# Patient Record
Sex: FEMALE | Race: WHITE | NOT HISPANIC OR LATINO | Employment: FULL TIME | URBAN - METROPOLITAN AREA
[De-identification: names, ages, dates, MRNs, and addresses within clinical notes are randomized per-mention and may not be internally consistent; named-entity substitution may affect disease eponyms.]

---

## 2017-07-27 ENCOUNTER — ALLSCRIPTS OFFICE VISIT (OUTPATIENT)
Dept: OTHER | Facility: OTHER | Age: 57
End: 2017-07-27

## 2017-07-27 ENCOUNTER — HOSPITAL ENCOUNTER (OUTPATIENT)
Dept: RADIOLOGY | Facility: HOSPITAL | Age: 57
Discharge: HOME | End: 2017-07-27
Attending: ORTHOPAEDIC SURGERY
Payer: COMMERCIAL

## 2017-07-27 DIAGNOSIS — M25.572 PAIN IN LEFT ANKLE: ICD-10-CM

## 2017-07-27 DIAGNOSIS — M23.92 INTERNAL DERANGEMENT OF LEFT KNEE: ICD-10-CM

## 2017-07-27 DIAGNOSIS — M25.562 PAIN IN LEFT KNEE: ICD-10-CM

## 2017-07-27 PROCEDURE — 73562 X-RAY EXAM OF KNEE 3: CPT

## 2017-07-27 PROCEDURE — 73610 X-RAY EXAM OF ANKLE: CPT

## 2017-08-23 ENCOUNTER — ALLSCRIPTS OFFICE VISIT (OUTPATIENT)
Dept: OTHER | Facility: OTHER | Age: 57
End: 2017-08-23

## 2017-08-23 ENCOUNTER — TRANSCRIBE ORDERS (OUTPATIENT)
Dept: ADMINISTRATIVE | Facility: HOSPITAL | Age: 57
End: 2017-08-23

## 2017-08-23 DIAGNOSIS — M23.92 DERANGEMENT OF COLLATERAL LIGAMENT OF LEFT KNEE: Primary | ICD-10-CM

## 2017-09-03 ENCOUNTER — HOSPITAL ENCOUNTER (OUTPATIENT)
Dept: RADIOLOGY | Facility: HOSPITAL | Age: 57
Discharge: HOME | End: 2017-09-03
Attending: ORTHOPAEDIC SURGERY
Payer: COMMERCIAL

## 2017-09-03 DIAGNOSIS — M23.92 INTERNAL DERANGEMENT OF LEFT KNEE: ICD-10-CM

## 2017-09-03 PROCEDURE — 73721 MRI JNT OF LWR EXTRE W/O DYE: CPT

## 2017-09-05 ENCOUNTER — ALLSCRIPTS OFFICE VISIT (OUTPATIENT)
Dept: OTHER | Facility: OTHER | Age: 57
End: 2017-09-05

## 2017-09-05 DIAGNOSIS — M17.12 PRIMARY OSTEOARTHRITIS OF LEFT KNEE: ICD-10-CM

## 2017-09-05 DIAGNOSIS — M23.92 INTERNAL DERANGEMENT OF LEFT KNEE: ICD-10-CM

## 2017-10-10 ENCOUNTER — GENERIC CONVERSION - ENCOUNTER (OUTPATIENT)
Dept: OTHER | Facility: OTHER | Age: 57
End: 2017-10-10

## 2017-10-17 ENCOUNTER — GENERIC CONVERSION - ENCOUNTER (OUTPATIENT)
Dept: OTHER | Facility: OTHER | Age: 57
End: 2017-10-17

## 2017-10-24 ENCOUNTER — GENERIC CONVERSION - ENCOUNTER (OUTPATIENT)
Dept: OTHER | Facility: OTHER | Age: 57
End: 2017-10-24

## 2017-11-24 ENCOUNTER — TRANSCRIBE ORDERS (OUTPATIENT)
Dept: ADMINISTRATIVE | Facility: HOSPITAL | Age: 57
End: 2017-11-24

## 2017-11-24 ENCOUNTER — HOSPITAL ENCOUNTER (OUTPATIENT)
Dept: RADIOLOGY | Age: 57
Discharge: HOME | End: 2017-11-24
Payer: COMMERCIAL

## 2017-11-24 DIAGNOSIS — Z12.39 BREAST SCREENING: Primary | ICD-10-CM

## 2017-11-24 DIAGNOSIS — Z12.31 OTHER SCREENING MAMMOGRAM: ICD-10-CM

## 2017-11-24 PROCEDURE — G0202 SCR MAMMO BI INCL CAD: HCPCS

## 2018-01-12 VITALS
SYSTOLIC BLOOD PRESSURE: 160 MMHG | BODY MASS INDEX: 32.33 KG/M2 | WEIGHT: 189.38 LBS | DIASTOLIC BLOOD PRESSURE: 115 MMHG | HEART RATE: 98 BPM | HEIGHT: 64 IN

## 2018-01-13 VITALS
HEART RATE: 76 BPM | HEIGHT: 64 IN | DIASTOLIC BLOOD PRESSURE: 92 MMHG | BODY MASS INDEX: 32.61 KG/M2 | SYSTOLIC BLOOD PRESSURE: 143 MMHG | WEIGHT: 191 LBS

## 2018-01-14 VITALS
BODY MASS INDEX: 32.16 KG/M2 | WEIGHT: 188.38 LBS | HEIGHT: 64 IN | HEART RATE: 89 BPM | SYSTOLIC BLOOD PRESSURE: 137 MMHG | DIASTOLIC BLOOD PRESSURE: 89 MMHG

## 2018-01-22 VITALS
BODY MASS INDEX: 33.29 KG/M2 | SYSTOLIC BLOOD PRESSURE: 154 MMHG | HEIGHT: 64 IN | HEART RATE: 78 BPM | WEIGHT: 195 LBS | DIASTOLIC BLOOD PRESSURE: 98 MMHG

## 2018-01-22 VITALS
SYSTOLIC BLOOD PRESSURE: 135 MMHG | DIASTOLIC BLOOD PRESSURE: 89 MMHG | BODY MASS INDEX: 32.52 KG/M2 | HEART RATE: 97 BPM | WEIGHT: 190.5 LBS | HEIGHT: 64 IN

## 2018-01-22 VITALS
DIASTOLIC BLOOD PRESSURE: 92 MMHG | HEIGHT: 64 IN | BODY MASS INDEX: 32.48 KG/M2 | HEART RATE: 84 BPM | SYSTOLIC BLOOD PRESSURE: 131 MMHG | WEIGHT: 190.25 LBS

## 2018-03-06 ENCOUNTER — OFFICE VISIT (OUTPATIENT)
Dept: OBGYN CLINIC | Facility: HOSPITAL | Age: 58
End: 2018-03-06
Payer: COMMERCIAL

## 2018-03-06 VITALS
WEIGHT: 189.4 LBS | SYSTOLIC BLOOD PRESSURE: 148 MMHG | BODY MASS INDEX: 32.33 KG/M2 | HEART RATE: 86 BPM | HEIGHT: 64 IN | DIASTOLIC BLOOD PRESSURE: 92 MMHG

## 2018-03-06 DIAGNOSIS — M25.562 CHRONIC PAIN OF LEFT KNEE: Primary | ICD-10-CM

## 2018-03-06 DIAGNOSIS — M17.12 PRIMARY OSTEOARTHRITIS OF LEFT KNEE: ICD-10-CM

## 2018-03-06 DIAGNOSIS — G89.29 CHRONIC PAIN OF LEFT KNEE: Primary | ICD-10-CM

## 2018-03-06 PROCEDURE — 99213 OFFICE O/P EST LOW 20 MIN: CPT | Performed by: ORTHOPAEDIC SURGERY

## 2018-03-06 RX ORDER — OMEPRAZOLE 20 MG/1
TABLET, DELAYED RELEASE ORAL
COMMUNITY
Start: 2011-06-29 | End: 2018-06-21

## 2018-03-06 RX ORDER — ALPRAZOLAM 0.25 MG/1
TABLET ORAL ONCE AS NEEDED
Refills: 2 | COMMUNITY
Start: 2018-02-10

## 2018-03-06 RX ORDER — IBUPROFEN 200 MG
TABLET ORAL
COMMUNITY
End: 2019-06-27

## 2018-03-06 NOTE — PROGRESS NOTES
61 y o female presents to the office for left knee pain  She has pops that cause pain  She has had injections in the past which have been beneficial to her  The hyaluronic injections proved to be more effective than the cortisone injections for her  Her pain increases with activity  She struggles with stairs and standing up from a seated position  Review of Systems  Review of systems negative unless otherwise specified in HPI    Past Medical History  Past Medical History:   Diagnosis Date    Disease of thyroid gland     Seasonal allergies        Past Surgical History  Past Surgical History:   Procedure Laterality Date    APPENDECTOMY      HAND SURGERY Left     THYROIDECTOMY      partial       Current Medications  Current Outpatient Prescriptions on File Prior to Visit   Medication Sig Dispense Refill    fexofenadine (ALLEGRA) 180 MG tablet Take 180 mg by mouth daily   levothyroxine 125 mcg tablet Take 125 mcg by mouth daily  125 mcg & 137 mcg alternate days   mometasone (NASONEX) 50 mcg/act nasal spray 2 sprays into each nostril daily   olopatadine (PATANOL) 0 1 % ophthalmic solution Administer 1 drop to both eyes 2 (two) times a day   [DISCONTINUED] fluticasone-salmeterol (ADVAIR) 250-50 mcg/dose Inhale 1 puff every 12 (twelve) hours  No current facility-administered medications on file prior to visit          Recent Labs [facility])    0  Lab Value Date/Time   HCT 40 1 06/18/2015 1347   HGB 13 5 06/18/2015 1347   WBC 3 80 (L) 06/18/2015 1347   GLUCOSE 86 06/18/2015 1347         Physical exam  · General: Awake, Alert, Oriented  · Eyes: Pupils equal, round and reactive to light  · Heart: regular rate and rhythm  · Lungs: No audible wheezing  · Abdomen: soft  left Knee exam  · [place] legged alignment  · Patient ambulates without assistance  · Crepitus present  · Grossly motor and sensory intact      Imaging  X-rays of left knee were reviewed  MRI of left knee was reviewed    Procedure  None    Assessment/Plan:   61 y  o female will start physical therapy for strengthening  Surgical intervention of left total knee arthroplasty was discussed in detail, including the risks of the procedure  She will return to the office in 3 months for another series of hyaluronic acid injections

## 2018-03-19 ENCOUNTER — TELEPHONE (OUTPATIENT)
Dept: OBGYN CLINIC | Facility: HOSPITAL | Age: 58
End: 2018-03-19

## 2018-03-19 NOTE — TELEPHONE ENCOUNTER
Spoke with Susan Coffman from [address] [place] [address] and she informed me that patient refused to pay $14 copay for Euflexxa injection (left knee) and wants to hold off at this point  Would like to resubmit the case around 5/21/18

## 2018-04-26 ENCOUNTER — HOSPITAL ENCOUNTER (OUTPATIENT)
Dept: RADIOLOGY | Facility: HOSPITAL | Age: 58
Discharge: HOME | End: 2018-04-26
Payer: COMMERCIAL

## 2018-04-26 ENCOUNTER — OFFICE VISIT (OUTPATIENT)
Dept: OBGYN CLINIC | Facility: HOSPITAL | Age: 58
End: 2018-04-26
Payer: COMMERCIAL

## 2018-04-26 VITALS
WEIGHT: 187 LBS | DIASTOLIC BLOOD PRESSURE: 84 MMHG | BODY MASS INDEX: 32.08 KG/M2 | SYSTOLIC BLOOD PRESSURE: 127 MMHG | HEART RATE: 56 BPM

## 2018-04-26 DIAGNOSIS — M25.562 CHRONIC PAIN OF LEFT KNEE: ICD-10-CM

## 2018-04-26 DIAGNOSIS — M25.562 ACUTE PAIN OF LEFT KNEE: Primary | ICD-10-CM

## 2018-04-26 DIAGNOSIS — S80.02XA CONTUSION OF LEFT KNEE, INITIAL ENCOUNTER: ICD-10-CM

## 2018-04-26 DIAGNOSIS — G89.29 CHRONIC PAIN OF LEFT KNEE: ICD-10-CM

## 2018-04-26 DIAGNOSIS — M25.562 ACUTE PAIN OF LEFT KNEE: ICD-10-CM

## 2018-04-26 PROCEDURE — 99203 OFFICE O/P NEW LOW 30 MIN: CPT | Performed by: PHYSICIAN ASSISTANT

## 2018-04-26 PROCEDURE — 73562 X-RAY EXAM OF KNEE 3: CPT

## 2018-04-26 RX ORDER — LISINOPRIL 2.5 MG/1
2.5 TABLET ORAL DAILY
Refills: 1 | COMMUNITY
Start: 2018-04-16 | End: 2018-06-21

## 2018-04-26 NOTE — PATIENT INSTRUCTIONS
Call or follow up with any new or worsening symptoms as discussed  No restrictions  Ice Pack Application   WHAT YOU NEED TO KNOW:   Ice can be used to decrease swelling and pain after an injury or surgery  Common injuries that may benefit from ice therapy are sprains, strains, and bruises  The use of ice is most effective in the first 1 to 3 days after an injury  DISCHARGE INSTRUCTIONS:   How to apply ice:   · Fill a bag with crushed ice about half full  Remove the air from the bag before you close it  You can also use a bag of frozen vegetables  · Wrap the ice pack in a cloth to protect your skin from frostbite or other injury  · Put the ice over the injured area for 20 to 30 minutes or as long as directed  · Check your skin after about 30 seconds for color changes or blistering  Remove the ice if you notice skin changes or you feel burning or numbness in the area  · Throw the ice pack away after use  · Apply ice to your injured area 4 times each day or as directed  Ask your healthcare provider how many days you should apply ice  Contact your healthcare provider if:   · You see blisters, whitening of your skin, or a bluish color to your skin after using ice  · You feel burning or numbness when using ice  · You have questions about the use of ice packs  © 2017 [address] [employer]  Information is for End User's use only and may not be sold, redistributed or otherwise used for commercial purposes  All illustrations and images included in CareNotes® are the copyrighted property of A D A M , Inc  or [employer]  The above information is an  only  It is not intended as medical advice for individual conditions or treatments  Talk to your doctor, nurse or pharmacist before following any medical regimen to see if it is safe and effective for you

## 2018-04-26 NOTE — PROGRESS NOTES
Assessment/Plan   Diagnoses and all orders for this visit:    Acute pain of left knee  -     XR knee 3 vw left non injury; Future    Chronic pain of left knee    Contusion of left knee, initial encounter    Other orders  -     lisinopril (ZESTRIL) 2 5 mg tablet; Take 2 5 mg by mouth daily          Subjective   Patient ID: Elena Nayak is a 61 y o  female  Vitals:    04/26/18 1801   BP: 127/84   Pulse: 64     58yo female comes in for an evaluation of her left knee  While at work today, she slipped and landed on her hands and knees, striking her left anterior knee on the ground  She is a previous patient of Dr Migdalia Ingram for left knee osteoarthritis  She c/o ecchymosis and pain in the anterior knee  The pain increases with direct touch  The dull, mild, pain does not radiate and is not associated with numbness  The following portions of the patient's history were reviewed and updated as appropriate: allergies, current medications, past family history, past medical history, past social history, past surgical history and problem list     Review of Systems  Ortho Exam  Past Medical History:   Diagnosis Date    Asthma     Disease of thyroid gland     Hypertension     Primary osteoarthritis of knee     Left - last assessed: Aug 23, 2017    Seasonal allergies      Past Surgical History:   Procedure Laterality Date    APPENDECTOMY      HAND SURGERY Left     THYROIDECTOMY      partial     Family History   Problem Relation Age of Onset    Hypertension Mother     Hyperlipidemia Mother     Diabetes Father     Heart attack Father     Stroke Father      Social History     Occupational History    Not on file  Social History Main Topics    Smoking status: Never Smoker    Smokeless tobacco: Never Used    Alcohol use Yes    Drug use: No    Sexual activity: Yes       Review of Systems   Constitutional: Negative  HENT: Negative  Eyes: Negative  Respiratory: Negative      Cardiovascular: Negative  Gastrointestinal: Negative  Endocrine: Negative  Genitourinary: Negative  Musculoskeletal: As below      Allergic/Immunologic: Negative  Neurological: Negative  Hematological: Negative  Psychiatric/Behavioral: Negative  Objective   Physical Exam    · Constitutional: Awake, Alert, Oriented  · Eyes: EOMI  · Psych: Mood and affect appropriate  · Heart: regular rate and rhythm  · Lungs: No audible wheezing  · Abdomen: soft  · Lymph: no lymphedema   left Knee:  - Appearance   No swelling, discoloration, deformity, or + 3cm area of ecchymosis just medial to the tibial tubercle  - Effusion   none  - Palpation   No tenderness about the medial / lateral joint lines, patella, patellar tendon, MCL, LCL, hamstrings, or medial / lateral plateaus   - ROM   Full and pain-free active ROM  - Special Tests   Pilar's Test negative, Lachman's Test negative, Anterior Drawer Test negative, Posterior Drawer Test negative, Valgus Stress Test negative, Varus Stress Test negative and Patellar apprehension negative  - Motor   normal 5/5 in all planes  - NVI distally    I have personally reviewed pertinent films in PACS and my interpretation is no acute displaced fx

## 2018-04-26 NOTE — LETTER
April 26, 2018     Patient: Lindsey Russ   YOB: 1960   Date of Visit: 4/26/2018       To Whom it May Concern:    [place] Olegario is under my professional care  She was seen in my office on 4/26/2018  She may return to work on 4/26/18  If you have any questions or concerns, please don't hesitate to call           Sincerely,          Andi Hart PA-C        CC: Lindsey Russ

## 2018-06-21 ENCOUNTER — OFFICE VISIT (OUTPATIENT)
Dept: OBGYN CLINIC | Facility: CLINIC | Age: 58
End: 2018-06-21
Payer: COMMERCIAL

## 2018-06-21 VITALS
DIASTOLIC BLOOD PRESSURE: 74 MMHG | WEIGHT: 185.8 LBS | HEIGHT: 63 IN | BODY MASS INDEX: 32.92 KG/M2 | SYSTOLIC BLOOD PRESSURE: 120 MMHG

## 2018-06-21 DIAGNOSIS — Z01.419 ENCOUNTER FOR GYNECOLOGICAL EXAMINATION WITHOUT ABNORMAL FINDING: Primary | ICD-10-CM

## 2018-06-21 PROCEDURE — G0145 SCR C/V CYTO,THINLAYER,RESCR: HCPCS | Performed by: NURSE PRACTITIONER

## 2018-06-21 PROCEDURE — S0610 ANNUAL GYNECOLOGICAL EXAMINA: HCPCS | Performed by: NURSE PRACTITIONER

## 2018-06-21 RX ORDER — ESTRADIOL 0.1 MG/G
CREAM VAGINAL
COMMUNITY

## 2018-06-21 RX ORDER — EPINEPHRINE 0.3 MG/.3ML
INJECTION SUBCUTANEOUS
COMMUNITY

## 2018-06-21 RX ORDER — ALPRAZOLAM 0.25 MG/1
TABLET ORAL
COMMUNITY
End: 2018-06-21

## 2018-06-21 RX ORDER — SODIUM FLUORIDE 5 MG/G
GEL, DENTIFRICE DENTAL
COMMUNITY
End: 2019-06-27

## 2018-06-21 RX ORDER — CHLORHEXIDINE GLUCONATE 0.12 MG/ML
RINSE ORAL
COMMUNITY
End: 2018-06-21

## 2018-06-21 RX ORDER — FLUTICASONE PROPIONATE 50 MCG
SPRAY, SUSPENSION (ML) NASAL
COMMUNITY
End: 2018-06-21

## 2018-06-21 RX ORDER — LEVOTHYROXINE SODIUM 0.12 MG/1
TABLET ORAL
COMMUNITY
End: 2018-06-21

## 2018-06-21 RX ORDER — LISINOPRIL 2.5 MG/1
TABLET ORAL DAILY
COMMUNITY
End: 2019-06-27

## 2018-06-21 RX ORDER — LEVALBUTEROL TARTRATE 45 UG/1
AEROSOL, METERED ORAL
COMMUNITY
End: 2019-06-27

## 2018-06-21 RX ORDER — MOMETASONE FUROATE 50 UG/1
SPRAY, METERED NASAL
COMMUNITY

## 2018-06-21 RX ORDER — ASCORBATE CALCIUM 500 MG
TABLET ORAL
COMMUNITY

## 2018-06-21 RX ORDER — UREA 40 %
CREAM (GRAM) TOPICAL AS NEEDED
COMMUNITY

## 2018-06-21 RX ORDER — LEVOTHYROXINE SODIUM 137 UG/1
TABLET ORAL
COMMUNITY

## 2018-06-21 RX ORDER — OMEPRAZOLE 40 MG/1
CAPSULE, DELAYED RELEASE ORAL
COMMUNITY
End: 2018-06-21

## 2018-06-21 RX ORDER — SALICYLIC ACID 6 G/100G
CREAM TOPICAL
COMMUNITY

## 2018-06-21 RX ORDER — OLOPATADINE HYDROCHLORIDE 1 MG/ML
SOLUTION/ DROPS OPHTHALMIC
COMMUNITY

## 2018-06-21 RX ORDER — BENZONATATE 200 MG/1
CAPSULE ORAL
COMMUNITY
End: 2018-06-21

## 2018-06-21 NOTE — PROGRESS NOTES
Assessment / Plan    1  Encounter for gynecological examination without abnormal finding  Normal well woman exam  Pap with hpv obtained  Mammograms are ordered by Dr Terrell Camara  Up to date on colonoscopy      Subjective      Grace Monahan is a 61 y o  female who presents for her annual gynecologic exam   NEW PATIENT  Known to me from [facility]    Overall doing okay  Recently  after 22 years  Last pap: 2015 negative, w/ neg HPV  Last mammogram: 2017 negative  Colonoscopy: -- due next year    Current contraception: post menopausal status  History of abnormal Pap smear: yes - Family history of breast,uterine, ovarian or colon cancer: yes -   colon ca, GM; breast ca, aunt  Menstrual History:  OB History      Para Term  AB Living    2 2            SAB TAB Ectopic Multiple Live Births                      Menarche age: 14  No LMP recorded (lmp unknown)  The following portions of the patient's history were reviewed and updated as appropriate: allergies, current medications, past family history, past medical history, past social history, past surgical history and problem list     Review of Systems      Review of Systems   Constitutional: Negative for chills and fever  Gastrointestinal: Negative for abdominal distention, abdominal pain, blood in stool, constipation, diarrhea, nausea and vomiting  Genitourinary: Negative for difficulty urinating, dysuria, frequency, genital sores, hematuria, menstrual problem, pelvic pain, urgency, vaginal bleeding and vaginal discharge  Breasts:  Negative for skin changes, dimpling, asymmetry, nipple discharge, redness, tenderness or palpable masses      Objective      /74   Ht 5' 3" (1 6 m)   Wt 84 3 kg (185 lb 12 8 oz)   LMP  (LMP Unknown)   BMI 32 91 kg/m²      Physical Exam   Constitutional: She appears well-developed and well-nourished  No distress  Neck: Neck supple  No thyromegaly present     Pulmonary/Chest: Right breast exhibits no inverted nipple, no mass, no nipple discharge, no skin change and no tenderness  Left breast exhibits no inverted nipple, no mass, no nipple discharge, no skin change and no tenderness  Breasts are symmetrical    Abdominal: Soft  Normal appearance  She exhibits no mass  There is no tenderness  There is no CVA tenderness  Genitourinary: Rectum normal and uterus normal  Rectal exam shows guaiac negative stool  No labial fusion  There is no rash, tenderness, lesion or injury on the right labia  There is no rash, tenderness, lesion or injury on the left labia  Uterus is not enlarged and not tender  Cervix exhibits no motion tenderness, no discharge and no friability  Right adnexum displays no mass, no tenderness and no fullness  Left adnexum displays no mass, no tenderness and no fullness  No erythema, tenderness or bleeding in the vagina  No foreign body in the vagina  No signs of injury around the vagina  No vaginal discharge found  Lymphadenopathy:     She has no cervical adenopathy  She has no axillary adenopathy  Right: No inguinal and no supraclavicular adenopathy present  Left: No inguinal and no supraclavicular adenopathy present  Neurological: She is alert  She is not disoriented  Skin: Skin is warm, dry and intact  Psychiatric: She has a normal mood and affect   Her behavior is normal

## 2018-06-22 ENCOUNTER — TELEPHONE (OUTPATIENT)
Dept: OBGYN CLINIC | Facility: HOSPITAL | Age: 58
End: 2018-06-22

## 2018-06-22 NOTE — TELEPHONE ENCOUNTER
Please refer this to Catherine Hitchcock, she is the person within the practice that handles [employer] supplementation requests    Thank you

## 2018-06-22 NOTE — TELEPHONE ENCOUNTER
Patient sees Dr Jj Velazquez   [phone]     Patient is asking us to start the auth process for the hyaluronic acid injections for her lt knee pain  Patient is stating that she had them last in October 2017  Patient is also stating that she needs this taken care of asap because her insurance is changing on 8/30/18  Patient has an appt on 7/17/18 which she is going to keep hoping that she can get these injections  Please advise

## 2018-06-27 NOTE — TELEPHONE ENCOUNTER
Kendra Valdez would like to talk to [address] [place] [address] regarding her injection order  Best contact [phone]    Thank you

## 2018-06-28 LAB
LAB AP GYN PRIMARY INTERPRETATION: NORMAL
Lab: NORMAL

## 2018-07-03 ENCOUNTER — TELEPHONE (OUTPATIENT)
Dept: OBGYN CLINIC | Facility: HOSPITAL | Age: 58
End: 2018-07-03

## 2018-07-03 DIAGNOSIS — M25.562 CHRONIC PAIN OF LEFT KNEE: Primary | ICD-10-CM

## 2018-07-03 DIAGNOSIS — G89.29 CHRONIC PAIN OF LEFT KNEE: Primary | ICD-10-CM

## 2018-07-03 NOTE — TELEPHONE ENCOUNTER
Caller: patient  Call back number: [phone]  Patient's doctor: Dr Katharina Valero    Patient is asking for a new script for PT  She states she was given a script for it in March but did not get to do it  She is scheduled for 7/11

## 2018-07-11 ENCOUNTER — EVALUATION (OUTPATIENT)
Dept: PHYSICAL THERAPY | Facility: REHABILITATION | Age: 58
End: 2018-07-11
Payer: COMMERCIAL

## 2018-07-11 DIAGNOSIS — M25.562 CHRONIC PAIN OF LEFT KNEE: Primary | ICD-10-CM

## 2018-07-11 DIAGNOSIS — G89.29 CHRONIC PAIN OF LEFT KNEE: Primary | ICD-10-CM

## 2018-07-11 PROCEDURE — G8991 OTHER PT/OT GOAL STATUS: HCPCS

## 2018-07-11 PROCEDURE — 97161 PT EVAL LOW COMPLEX 20 MIN: CPT | Performed by: PHYSICAL THERAPIST

## 2018-07-11 PROCEDURE — 97110 THERAPEUTIC EXERCISES: CPT | Performed by: PHYSICAL THERAPIST

## 2018-07-11 PROCEDURE — G8990 OTHER PT/OT CURRENT STATUS: HCPCS

## 2018-07-11 NOTE — PROGRESS NOTES
PT Evaluation     Today's date: 2018  Patient name: Hali Sales  : 1960  MRN: [id number]  Referring provider: Daylin Abdi MD  Dx:   Encounter Diagnosis     ICD-10-CM    1  Chronic pain of left knee M25 562     G89 29        Start Time:   Stop Time: 102  Total time in clinic (min): 49 minutes    Assessment  Impairments: abnormal or restricted ROM, activity intolerance, impaired balance, impaired physical strength, lacks appropriate home exercise program and pain with function    Assessment details: Pt presents with pain, decreased balance, decreased LE flexibility, decreased LE strength, and decreased tolerance to functional activities secondary to chronic L knee pain/OA  Pt would benefit from skilled PT intervention to address these impairments and to maximize function  Thank you for this referral    Understanding of Dx/Px/POC: good   Prognosis: good    Goals  ST  Pt to decrease pain by 3 subjective ratings in 4 wks  2  Pt to increase grade of all MMTs by 1/2 in 4 wks  3  Pt to increase L knee ROM by 5 degrees in 4 wks  LT  Pt to increase FOTO score, > 60  2  Pt to be independent in HEP  3  Pt to be independent in al ADLs in 8 wks  4  Pt to ambulate for 30 min painfree in 8 wks    Plan  Patient would benefit from: skilled physical therapy  Planned modality interventions: cryotherapy  Planned therapy interventions: flexibility, home exercise program, patient education, therapeutic exercise, manual therapy, balance, neuromuscular re-education and therapeutic activities  Frequency: 2x week  Duration in weeks: 8  Plan of Care beginning date: 2018  Plan of Care expiration date: 2018  Treatment plan discussed with: patient  Plan details: Pt was instructed in HEP  Subjective Evaluation    History of Present Illness  Mechanism of injury: Pt is a 60 y/o female w/ L knee OA  In 2017 pt reported swelling and had fluid removed by Dr Sarah Sin   Pt reports clicking and pain began after fluid removal  Pt received hydronic acid injections in 2017 and plans to receive more in a month  Pt sees Dr Rosalva Collins every 6 months, and saw him in March and will see him again next week  Pt reports clicking in knee has stopped  Pt started biking and walking recently  After 15 min of walking, medial knee pain begins  Pt denies pain during recumbent bike  Pt denies pain or difficulty with cooking, cleaning, sleeping, dressing, or household chores  Pt states squatting to lift aggravates medial knee  Pt reports feeling tightness around hamstrings and quads due to compensation  Pain  At best pain ratin  At worst pain ratin  Location: L knee  Quality: burning  Relieving factors: rest  Aggravating factors: walking      Diagnostic Tests  X-ray: abnormal  Patient Goals  Patient goals for therapy: increased strength, decreased pain and return to sport/leisure activities  Patient goal: exercises wtihout pain and increase flexibility         Objective     Active Range of Motion   Left Knee   Flexion: 135 degrees with pain  Extension: 2 degrees     Additional Active Range of Motion Details  Pain/tightness on anterior knee    Passive Range of Motion   Left Knee   Flexion: 139 degrees with pain  Extension: 0 degrees     Additional Passive Range of Motion Details  Pain/tightness in anterior knee    Strength/Myotome Testing     Left Hip   Planes of Motion   Flexion: 4  Extension: 4 and WFL  Abduction: 4-    Left Knee   Flexion: 5  Extension: 4+    Right Knee   Flexion: 5  Extension: 5    Additional Strength Details  Pain with extension in L knee    Tests     Left Knee   Negative anterior drawer, anterior Lachman, lateral Pilar, medial Pilar, posterior drawer, valgus stress test at 0 degrees, valgus stress test at 30 degrees, varus stress test at 0 degrees and varus stress test at 30 degrees  Additional Tests Details  Slight medial pain with both medial and lateral Pilar       General Comments     Knee Comments  Noted decrease in flexibility of gastroc and quads bilat, decrease in flexibility of HS L>R  Medial pain with squat   Medial pain negotiating step, ascending > descending       Flowsheet Rows      Most Recent Value   PT/OT G-Codes   Current Score  51   Projected Score  60   FOTO information reviewed  Yes   Assessment Type  Evaluation   G code set  Other PT/OT Primary   Other PT Primary Current Status ()  CK   Other PT Primary Goal Status ()  CK          Precautions: OA, asthma, HTN, back pain    Daily Treatment Diary     Manual                                                                                   Exercise Diary              bike             Mini squats- pain free             Step ups fwd/lat             Total gym lat squats             rockerboard ap/ml             HR/TR             Bridges w/ tb             SLR flex/abd             clamshells                                                                                                                                                                Modalities              CP prn

## 2018-07-17 ENCOUNTER — TELEPHONE (OUTPATIENT)
Dept: OBGYN CLINIC | Facility: CLINIC | Age: 58
End: 2018-07-17

## 2018-07-17 ENCOUNTER — OFFICE VISIT (OUTPATIENT)
Dept: OBGYN CLINIC | Facility: HOSPITAL | Age: 58
End: 2018-07-17
Payer: COMMERCIAL

## 2018-07-17 VITALS
DIASTOLIC BLOOD PRESSURE: 92 MMHG | HEIGHT: 63 IN | BODY MASS INDEX: 32.92 KG/M2 | WEIGHT: 185.8 LBS | HEART RATE: 71 BPM | SYSTOLIC BLOOD PRESSURE: 143 MMHG

## 2018-07-17 DIAGNOSIS — M25.562 CHRONIC PAIN OF LEFT KNEE: Primary | ICD-10-CM

## 2018-07-17 DIAGNOSIS — M17.12 PRIMARY OSTEOARTHRITIS OF LEFT KNEE: ICD-10-CM

## 2018-07-17 DIAGNOSIS — G89.29 CHRONIC PAIN OF LEFT KNEE: Primary | ICD-10-CM

## 2018-07-17 PROCEDURE — 20610 DRAIN/INJ JOINT/BURSA W/O US: CPT | Performed by: ORTHOPAEDIC SURGERY

## 2018-07-17 RX ORDER — HYALURONATE SODIUM 10 MG/ML
20 SYRINGE (ML) INTRAARTICULAR
Status: COMPLETED | OUTPATIENT
Start: 2018-07-17 | End: 2018-07-17

## 2018-07-17 RX ADMIN — Medication 20 MG: at 10:14

## 2018-07-17 NOTE — TELEPHONE ENCOUNTER
Pt called wondering if had HPV done on pap cause family DR was asking  Looked at your office note and it said it was ordered, but did not see on result  Spoke with Merly Be and she is going to see if lab had results or if still could be run  Pt was unsure if wanted run or not   Please call [phone]

## 2018-07-17 NOTE — TELEPHONE ENCOUNTER
Pap with HPV originally ordered, but HPV component not performed  Spoke to patient  Since she has no history of abnormal paps and her last HPV in 2015 was negative, will wait until next year to do the HPV  No need to have lab back run it this year

## 2018-07-17 NOTE — PROGRESS NOTES
61 y o female with an arthritic left knee presents for evaluation  She has return to weight-bearing pain left knee  She has pain level left knee joint, the pain is made worse bearing weight, the pain increases with increased activities  A series of viscosupplementation this past October has resulted in significant resolution of these current symptoms, however her symptoms come back for the past few weeks  Review of Systems  Review of systems negative unless otherwise specified in HPI    Past Medical History  Past Medical History:   Diagnosis Date    Asthma     Disease of thyroid gland     Hashimoto's    Hypertension     Primary osteoarthritis of knee     Left - last assessed: Aug 23, 2017    Seasonal allergies        Past Surgical History  Past Surgical History:   Procedure Laterality Date    APPENDECTOMY      DIAGNOSTIC LAPAROSCOPY      HAND SURGERY Left     THYROIDECTOMY  1998    partial       Current Medications  Current Outpatient Prescriptions on File Prior to Visit   Medication Sig Dispense Refill    ALPRAZolam (XANAX) 0 25 mg tablet TAKE 2 TABLETS DAILY FOR ANXIETY  2    Brimonidine Tartrate (MIRVASO) 0 33 % GEL Mirvaso 0 33 % topical gel      Calcium Ascorbate 500 MG TABS TAKE 1 TABLET DAILY FOR 50 DAYS      Cholecalciferol 70103 units CAPS       diclofenac sodium (VOLTAREN) 1 % APPLY 2 GRAM TO THE AFFECTED AREA(S) BY TOPICAL ROUTE 4 TIMES PER DAY      EPINEPHrine (EPIPEN 2-PHOENIX) 0 3 mg/0 3 mL SOAJ EpiPen 2-[place] 0 3 mg/0 3 mL injection, auto-injector      estradiol (ESTRACE VAGINAL) 0 1 mg/g vaginal cream INSERT ONE GRAM DAILY VAGINALLY AT NIGHT FOR 14 DAYS, THEN TWICE WEEKLY FOR MAINTENANCE   fexofenadine (ALLEGRA) 180 MG tablet Take 180 mg by mouth daily        ibuprofen (MOTRIN) 200 mg tablet Take by mouth      levalbuterol (XOPENEX HFA) 45 mcg/act inhaler Xopenex HFA 45 mcg/actuation aerosol inhaler      levothyroxine (SYNTHROID) 137 mcg tablet Synthroid 137 mcg tablet      levothyroxine 125 mcg tablet Take 125 mcg by mouth daily  125 mcg & 137 mcg alternate days   lisinopril (ZESTRIL) 2 5 mg tablet Daily      mometasone (ASMANEX) 220 MCG/INH inhaler Inhale 2 puffs daily      mometasone (NASONEX) 50 mcg/act nasal spray mometasone 50 mcg/actuation nasal spray      olopatadine (PATANOL) 0 1 % ophthalmic solution olopatadine 0 1 % eye drops      Salicylic Acid 6 % CREA salicylic acid 6 % topical cream      SODIUM FLUORIDE, DENTAL GEL, (PREVIDENT) 1 1 % GEL PreviDent 5000 Booster Plus 1 1 % dental paste      urea (CARMOL) 40 % urea 40 % topical cream       No current facility-administered medications on file prior to visit  Recent Labs [facility])    0  Lab Value Date/Time   HCT 40 1 06/18/2015 1347   HGB 13 5 06/18/2015 1347   WBC 3 80 (L) 06/18/2015 1347   GLUCOSE 86 06/18/2015 1347         Physical exam  · General: Awake, Alert, Oriented  · Eyes: Pupils equal, round and reactive to light  · Heart: regular rate and rhythm  · Lungs: No audible wheezing  · Abdomen: soft  Gait patterns without antalgia  Left hip was well  Left thighs devoid of atrophy left knee is in varus  There is no effusion  There is bony enlargement and tenderness medially  There is crepitation flexion extension  There is no palpable warmth the synovium  Calf compartments are soft and supple  Toes are warm, sensate, mobile  Imaging  No new x-rays accompany her today    Procedure  An injection of viscosupplementation is provided for the left knee    It is documented below      Large joint arthrocentesis  Date/Time: 7/17/2018 10:14 AM  Consent given by: patient  Supporting Documentation  Indications: pain   Procedure Details  Location: knee - L knee  Preparation: Patient was prepped and draped in the usual sterile fashion  Approach: anterolateral  Medications administered: 20 mg Sodium Hyaluronate 20 MG/2ML    Patient tolerance: patient tolerated the procedure well with no immediate complications  Dressing:  Sterile dressing applied          Assessment/Plan:   61 y  o female with return of symptomatic osteoarthritis in the left knee  An injection of viscosupplementation is indicated  It is advised, except, administers outlined above   I would welcome the opportunity see back in the office next week for ongoing viscosupplementation to the left knee

## 2018-07-18 ENCOUNTER — OFFICE VISIT (OUTPATIENT)
Dept: PHYSICAL THERAPY | Facility: REHABILITATION | Age: 58
End: 2018-07-18
Payer: COMMERCIAL

## 2018-07-18 DIAGNOSIS — M25.562 CHRONIC PAIN OF LEFT KNEE: Primary | ICD-10-CM

## 2018-07-18 DIAGNOSIS — G89.29 CHRONIC PAIN OF LEFT KNEE: Primary | ICD-10-CM

## 2018-07-18 PROCEDURE — 97112 NEUROMUSCULAR REEDUCATION: CPT

## 2018-07-18 PROCEDURE — 97110 THERAPEUTIC EXERCISES: CPT

## 2018-07-18 PROCEDURE — 97530 THERAPEUTIC ACTIVITIES: CPT

## 2018-07-18 NOTE — PROGRESS NOTES
Daily Note     Today's date: 2018  Patient name: Lindsey Russ  : 1960  MRN: [id number]  Referring provider: Isaac Corrigan MD  Dx:   Encounter Diagnosis     ICD-10-CM    1  Chronic pain of left knee M25 562     G89 29                   Subjective: Pt reports she had an injection in the knee yesterday, says it has provided some relief  Objective: See treatment diary below      Precautions: OA, asthma, HTN, back pain    Daily Treatment Diary     Manual                                                                                   Exercise Diary              bike 8'            Mini squats- pain free 2x10            Step ups fwd/lat 2x15 6"            Total gym lat squats 2x15            rockerboard ap/ml 20ea            HR/TR 30ea            Bridges w/ tb 2x10 OTB            SLR flex/abd 2x10            clamshells 2x10 ea                                                                                                                                                               Modalities              CP prn                                           Assessment: Tolerated treatment well  Patient would benefit from continued PT  Pt able to complete all exercises without complaint  Pt  able to complete all exercises with no increase in pain during or after session  Pt 1:1 with PTA [id number], IEP for remainder  Plan: Continue per plan of care

## 2018-07-20 ENCOUNTER — OFFICE VISIT (OUTPATIENT)
Dept: PHYSICAL THERAPY | Facility: REHABILITATION | Age: 58
End: 2018-07-20
Payer: COMMERCIAL

## 2018-07-20 DIAGNOSIS — M25.562 CHRONIC PAIN OF LEFT KNEE: Primary | ICD-10-CM

## 2018-07-20 DIAGNOSIS — G89.29 CHRONIC PAIN OF LEFT KNEE: Primary | ICD-10-CM

## 2018-07-20 PROCEDURE — 97110 THERAPEUTIC EXERCISES: CPT | Performed by: PHYSICAL THERAPIST

## 2018-07-20 PROCEDURE — 97112 NEUROMUSCULAR REEDUCATION: CPT | Performed by: PHYSICAL THERAPIST

## 2018-07-20 NOTE — PROGRESS NOTES
Daily Note     Today's date: 2018  Patient name: Deb Party  : 1960  MRN: [id number]  Referring provider: Brian Bach MD  Dx:   Encounter Diagnosis     ICD-10-CM    1  Chronic pain of left knee M25 562     G89 29        Start Time: 0950  Stop Time: 1030  Total time in clinic (min): 40 minutes   1on1 950-1025 and performed remaining TE's as part of IEP  Subjective: Pt states feeling well after first session on Wednesday  Pt received  injection on Tuesday and has yet to experience pain relief  Objective: See treatment diary below  Manual                                                                                                                                                      Exercise Diary                     bike 8'  10'                   Mini squats- pain free 2x10  2x10                   Step ups fwd/lat 2x15 6"  2x15 6'' ea                   Total gym lat squats 2x15  2x15 L7                   rockerboard ap/ml 20ea  20 ea                   HR/TR 30ea  30 ea                   Bridges w/ tb 2x10 OTB  2x10 OTB                   SLR flex/abd 2x10  2x10                   clamshells 2x10 ea  2x10 ea                                                                                                                                                                                                                                                                                                 Modalities                        CP prn                                                                             Assessment: Tolerated treatment well  Patient demonstrated fatigue post treatment and would benefit from continued PT  Pt stated mild fatigue in hip musculature after clamshells and SLR abd    Plan: Progress note during next visit

## 2018-07-23 ENCOUNTER — OFFICE VISIT (OUTPATIENT)
Dept: PHYSICAL THERAPY | Facility: REHABILITATION | Age: 58
End: 2018-07-23
Payer: COMMERCIAL

## 2018-07-23 DIAGNOSIS — M25.562 CHRONIC PAIN OF LEFT KNEE: Primary | ICD-10-CM

## 2018-07-23 DIAGNOSIS — G89.29 CHRONIC PAIN OF LEFT KNEE: Primary | ICD-10-CM

## 2018-07-23 PROCEDURE — 97110 THERAPEUTIC EXERCISES: CPT | Performed by: PHYSICAL THERAPIST

## 2018-07-23 PROCEDURE — 97112 NEUROMUSCULAR REEDUCATION: CPT | Performed by: PHYSICAL THERAPIST

## 2018-07-23 NOTE — PROGRESS NOTES
Daily Note     Today's date: 2018  Patient name: Olga Jordan  : 1960  MRN: [id number]  Referring provider: Alexx Mcfarlane MD  Dx:   Encounter Diagnosis     ICD-10-CM    1  Chronic pain of left knee M25 562     G89 29            1on1 entire session  Subjective: Pt reports feeling well and is getting a second injection tomorrow  Objective: See treatment diary below  Manual                                                                                                                                                      Exercise Diary                   bike 8'  10'  10'                 Mini squats- pain free 2x10  2x10  2x15                 Step ups fwd/lat 2x15 6"  2x15 6'' ea  2x15 8" ea                 Total gym lat squats 2x15  2x15 L7  2x20 L7                 rockerboard ap/ml 20ea  20 ea  20 ea                 HR/TR 30ea  30 ea  30 ea                 Bridges w/ tb 2x10 OTB  2x10 OTB  2x15 otb                 SLR flex/abd 2x10  2x10  2x15                 clamshells 2x10 ea  2x10 ea  2x15 ea                                                                                                                                                                                                                                                                                               Modalities                        CP prn                                                                             Assessment: Tolerated treatment well  Patient exhibited good technique with therapeutic exercises  Pt tolerated progression well and did not c/o any sx during [place]  Plan: Continue per plan of care

## 2018-07-24 ENCOUNTER — OFFICE VISIT (OUTPATIENT)
Dept: OBGYN CLINIC | Facility: HOSPITAL | Age: 58
End: 2018-07-24
Payer: COMMERCIAL

## 2018-07-24 VITALS
HEIGHT: 63 IN | BODY MASS INDEX: 32.78 KG/M2 | DIASTOLIC BLOOD PRESSURE: 76 MMHG | HEART RATE: 90 BPM | SYSTOLIC BLOOD PRESSURE: 113 MMHG | WEIGHT: 185 LBS

## 2018-07-24 DIAGNOSIS — M17.12 PRIMARY OSTEOARTHRITIS OF LEFT KNEE: ICD-10-CM

## 2018-07-24 DIAGNOSIS — G89.29 CHRONIC PAIN OF LEFT KNEE: Primary | ICD-10-CM

## 2018-07-24 DIAGNOSIS — M25.562 CHRONIC PAIN OF LEFT KNEE: Primary | ICD-10-CM

## 2018-07-24 PROCEDURE — 20610 DRAIN/INJ JOINT/BURSA W/O US: CPT | Performed by: ORTHOPAEDIC SURGERY

## 2018-07-24 RX ORDER — HYALURONATE SODIUM 10 MG/ML
20 SYRINGE (ML) INTRAARTICULAR
Status: COMPLETED | OUTPATIENT
Start: 2018-07-24 | End: 2018-07-24

## 2018-07-24 RX ADMIN — Medication 20 MG: at 15:35

## 2018-07-24 NOTE — PROGRESS NOTES
Adult  female who presents for ongoing viscosupplementation to the left knee  Exam confirms neither effusion or erythema in the left knee  Assessment/plan:  Adult female requires ongoing viscosupplementation  It is advised, except, administers outlined above    I would welcome the opportunity see back next week for ongoing viscosupplementation to the left knee      Large joint arthrocentesis  Date/Time: 7/24/2018 3:35 PM  Consent given by: patient  Supporting Documentation  Indications: pain   Procedure Details  Location: knee - L knee  Approach: anteromedial  Medications administered: 20 mg Sodium Hyaluronate 20 MG/2ML    Patient tolerance: patient tolerated the procedure well with no immediate complications  Dressing:  Sterile dressing applied

## 2018-07-25 ENCOUNTER — OFFICE VISIT (OUTPATIENT)
Dept: PHYSICAL THERAPY | Facility: REHABILITATION | Age: 58
End: 2018-07-25
Payer: COMMERCIAL

## 2018-07-25 DIAGNOSIS — M25.562 CHRONIC PAIN OF LEFT KNEE: Primary | ICD-10-CM

## 2018-07-25 DIAGNOSIS — G89.29 CHRONIC PAIN OF LEFT KNEE: Primary | ICD-10-CM

## 2018-07-25 PROCEDURE — 97112 NEUROMUSCULAR REEDUCATION: CPT

## 2018-07-25 PROCEDURE — 97110 THERAPEUTIC EXERCISES: CPT

## 2018-07-30 ENCOUNTER — APPOINTMENT (OUTPATIENT)
Dept: PHYSICAL THERAPY | Facility: REHABILITATION | Age: 58
End: 2018-07-30
Payer: COMMERCIAL

## 2018-07-31 ENCOUNTER — OFFICE VISIT (OUTPATIENT)
Dept: OBGYN CLINIC | Facility: HOSPITAL | Age: 58
End: 2018-07-31
Payer: COMMERCIAL

## 2018-07-31 VITALS
SYSTOLIC BLOOD PRESSURE: 122 MMHG | BODY MASS INDEX: 32.77 KG/M2 | DIASTOLIC BLOOD PRESSURE: 80 MMHG | WEIGHT: 184.97 LBS | HEART RATE: 69 BPM | HEIGHT: 63 IN

## 2018-07-31 DIAGNOSIS — G89.29 CHRONIC PAIN OF LEFT KNEE: Primary | ICD-10-CM

## 2018-07-31 DIAGNOSIS — M25.562 CHRONIC PAIN OF LEFT KNEE: Primary | ICD-10-CM

## 2018-07-31 DIAGNOSIS — M17.12 PRIMARY OSTEOARTHRITIS OF LEFT KNEE: ICD-10-CM

## 2018-07-31 PROCEDURE — 20610 DRAIN/INJ JOINT/BURSA W/O US: CPT | Performed by: ORTHOPAEDIC SURGERY

## 2018-07-31 RX ORDER — HYALURONATE SODIUM 10 MG/ML
20 SYRINGE (ML) INTRAARTICULAR
Status: COMPLETED | OUTPATIENT
Start: 2018-07-31 | End: 2018-07-31

## 2018-07-31 RX ADMIN — Medication 20 MG: at 13:11

## 2018-07-31 NOTE — PROGRESS NOTES
Adult female presents for ongoing viscosupplementation to the left knee  Exam finds neither erythema or effusion in the left knee  Assessment/plan:  Ongoing viscosupplementation is indicated for this arthritic left knee  It is advised, except, administers outlined above   I would welcome the opportunity see back in 3 months time for follow-up      Large joint arthrocentesis  Date/Time: 7/31/2018 1:11 PM  Consent given by: patient  Supporting Documentation  Indications: pain   Procedure Details  Location: knee - L knee  Needle size: 20 G  Approach: anteromedial  Medications administered: 20 mg Sodium Hyaluronate 20 MG/2ML    Patient tolerance: patient tolerated the procedure well with no immediate complications  Dressing:  Sterile dressing applied

## 2018-08-01 ENCOUNTER — APPOINTMENT (OUTPATIENT)
Dept: PHYSICAL THERAPY | Facility: REHABILITATION | Age: 58
End: 2018-08-01
Payer: COMMERCIAL

## 2018-08-02 ENCOUNTER — OFFICE VISIT (OUTPATIENT)
Dept: PHYSICAL THERAPY | Facility: REHABILITATION | Age: 58
End: 2018-08-02
Payer: COMMERCIAL

## 2018-08-02 DIAGNOSIS — G89.29 CHRONIC PAIN OF LEFT KNEE: Primary | ICD-10-CM

## 2018-08-02 DIAGNOSIS — M25.562 CHRONIC PAIN OF LEFT KNEE: Primary | ICD-10-CM

## 2018-08-02 PROCEDURE — 97112 NEUROMUSCULAR REEDUCATION: CPT | Performed by: PHYSICAL THERAPIST

## 2018-08-02 PROCEDURE — 97110 THERAPEUTIC EXERCISES: CPT | Performed by: PHYSICAL THERAPIST

## 2018-08-02 NOTE — PROGRESS NOTES
Daily Note     Today's date: 2018  Patient name: Meliza South  : 1960  MRN: [id number]  Referring provider: Anibal Page MD  Dx:   Encounter Diagnosis     ICD-10-CM    1  Chronic pain of left knee M25 562     G89 29            1on1 entire session  Subjective: Pt reports some knee stiffness primarily  Pt had her 3rd viscosupplementation injection on Tuesday and notes it has provided some pain relief  Objective: See treatment diary below  Manual                                                                              Exercise Diary    8/2             bike 8'  10'  10'  10'  10'             Mini squats- pain free 2x10  2x10  2x15  HOLD  hold per MD             Step ups fwd/lat 2x15 6"  2x15 6'' ea  2x15 8" ea  8"   2x15 ea   8" 2x15 ea             Total gym lat squats 2x15  2x15 L7  2x20 L7  HOLD  HOLD per MD             rockerboard ap/ml 20ea  20 ea  20 ea  20ea/30"  20x/30"ea             HR/TR 30ea  30 ea  30 ea  30 ea   30ea             Bridges w/ tb 2x10 OTB  2x10 OTB  2x15 otb  OTB  3" 2x15  OTB 3"x25             SLR flex/abd 2x10  2x10  2x15  2x15 ea   2x15ea flex-stand             clamshells 2x10 ea  2x10 ea  2x15 ea  2x15 ea   2x15ea              leg press          55#, 2x10                                                                                                                                                                                                                                                                   Modalities                        CP prn                                                                             Assessment: Tolerated treatment well  Patient had no c/o pain with TE performance or at conclusion of session  Performed SLR flexion in standing this session due to pt c/o LBP with performance in supine, despite performing TA contraction also  Plan: Continue per plan of care

## 2018-08-03 ENCOUNTER — OFFICE VISIT (OUTPATIENT)
Dept: PHYSICAL THERAPY | Facility: REHABILITATION | Age: 58
End: 2018-08-03
Payer: COMMERCIAL

## 2018-08-03 DIAGNOSIS — G89.29 CHRONIC PAIN OF LEFT KNEE: Primary | ICD-10-CM

## 2018-08-03 DIAGNOSIS — M25.562 CHRONIC PAIN OF LEFT KNEE: Primary | ICD-10-CM

## 2018-08-03 PROCEDURE — 97110 THERAPEUTIC EXERCISES: CPT

## 2018-08-03 PROCEDURE — 97112 NEUROMUSCULAR REEDUCATION: CPT

## 2018-08-03 PROCEDURE — 97530 THERAPEUTIC ACTIVITIES: CPT

## 2018-08-03 NOTE — PROGRESS NOTES
Daily Note     Today's date: 8/3/2018  Patient name: Shonna Farias  : 1960  MRN: [id number]  Referring provider: Jj Spencer MD  Dx:   Encounter Diagnosis     ICD-10-CM    1  Chronic pain of left knee M25 562     G89 29            1:1 with PTA CR 9:55-10:35  Subjective: Pain free upon arrival  Patient will be on vacation next week and is scheduled to resume PT upon return  Objective: See treatment diary below    Manual                                                      Exercise Diary  7/18  7/20  7/23  7/25  8/2  8/3       bike 8'  10'  10'  10'  10'  10'       Mini squats- pain free 2x10  2x10  2x15  HOLD  hold per MD [facility] per MD       Step ups fwd/lat 2x15 6"  2x15 6'' ea  2x15 8" ea  8"   2x15 ea   8" 2x15 ea  8"  2x15 ea        Total gym lat squats 2x15  2x15 L7  2x20 L7  HOLD  HOLD per MD Cipriano Cook  Per MD       rockerboard ap/ml 20ea  20 ea  20 ea  20ea/30"  20x/30"ea  30ea/30"       HR/TR 30ea  30 ea  30 ea  30 ea   30ea  30 ea        Bridges w/ tb 2x10 OTB  2x10 OTB  2x15 otb  OTB  3" 2x15  OTB 3"x25  OTB  3"x25       SLR flex in standing    SLR abduction  sidelying 2x10  2x10  2x15  2x15 ea   2x15ea flex-stand  flex:  2x15 ea  Abd:   2x15 ea        clamshells 2x10 ea  2x10 ea  2x15 ea  2x15 ea   2x15ea  2x15 ea         leg press          55#, 2x10  55#  2x10                                 Modalities                        CP prn                             Assessment: Tolerated treatment well  Attempt light resistance with standing SLR flexion NV  Corrected form with SLR abduction to better isolate glute med  No complaints with current POC  Plan: Continue per plan of care

## 2018-08-06 ENCOUNTER — TELEPHONE (OUTPATIENT)
Dept: OBGYN CLINIC | Facility: CLINIC | Age: 58
End: 2018-08-06

## 2018-08-06 NOTE — TELEPHONE ENCOUNTER
Spoke to patient  She states she is fine with forgoing HPV this year since it was not run on her pap specimen  She would like to have the HPV only done next year  She will check her insurance coverage to make sure

## 2018-08-06 NOTE — TELEPHONE ENCOUNTER
Message received from Mirtha Morel regarding patient's concern about the fact that HPV testing was not done on her pap smear this year  Left message on patient's cell voice mail to call me back to discuss further

## 2018-08-14 ENCOUNTER — OFFICE VISIT (OUTPATIENT)
Dept: PHYSICAL THERAPY | Facility: REHABILITATION | Age: 58
End: 2018-08-14
Payer: COMMERCIAL

## 2018-08-14 DIAGNOSIS — G89.29 CHRONIC PAIN OF LEFT KNEE: Primary | ICD-10-CM

## 2018-08-14 DIAGNOSIS — M25.562 CHRONIC PAIN OF LEFT KNEE: Primary | ICD-10-CM

## 2018-08-14 PROCEDURE — 97530 THERAPEUTIC ACTIVITIES: CPT | Performed by: PHYSICAL THERAPIST

## 2018-08-14 PROCEDURE — 97110 THERAPEUTIC EXERCISES: CPT | Performed by: PHYSICAL THERAPIST

## 2018-08-14 PROCEDURE — 97112 NEUROMUSCULAR REEDUCATION: CPT | Performed by: PHYSICAL THERAPIST

## 2018-08-14 NOTE — PROGRESS NOTES
Daily Note     Today's date: 2018  Patient name: Varun Joy  : 1960  MRN: [id number]  Referring provider: Mary Flores MD  Dx:   Encounter Diagnosis     ICD-10-CM    1  Chronic pain of left knee M25 562     G89 29                  Subjective: Patient stated mild knee pain prior to treatment which she attributed to performing repetitive stair climbing while on vacation  Objective: See treatment diary below    Manual                                                      Exercise Diary  7/18  7/20  7/23  7/25  8/2  8/3  8/14     bike 8'  10'  10'  10'  10'  10'  10'     Mini squats- pain free 2x10  2x10  2x15  HOLD  hold per MD [facility] per MD HOLD     Step ups fwd/lat 2x15 6"  2x15 6'' ea  2x15 8" ea  8"   2x15 ea   8" 2x15 ea  8"  2x15 ea   8" 2x15 ea     Total gym lat squats 2x15  2x15 L7  2x20 L7  HOLD  HOLD per MD  HOLD  Per MD  HOLD     rockerboard ap/ml 20ea  20 ea  20 ea  20ea/30"  20x/30"ea  30ea/30"  30 ea/30"     HR/TR 30ea  30 ea  30 ea  30 ea   30ea  30 ea   30 ea     Bridges w/ tb 2x10 OTB  2x10 OTB  2x15 otb  OTB  3" 2x15  OTB 3"x25  OTB  3"x25  OTB 3"x25     SLR flex in standing    SLR abduction  sidelying 2x10  2x10  2x15  2x15 ea   2x15ea flex-stand  flex:  2x15 ea  Abd:   2x15 ea   flex: 2x15 ea  abd 2x15      clamshells 2x10 ea  2x10 ea  2x15 ea  2x15 ea   2x15ea  2x15 ea   2x15 ea       leg press          55#, 2x10  55#  2x10 55# 2x10                               Modalities                        CP prn                             Assessment: Patient performed exercises without c/o pain; declined adding weight to SLR exercises this visit  Plan: Continue per plan of care

## 2018-08-16 ENCOUNTER — OFFICE VISIT (OUTPATIENT)
Dept: PHYSICAL THERAPY | Facility: REHABILITATION | Age: 58
End: 2018-08-16
Payer: COMMERCIAL

## 2018-08-16 DIAGNOSIS — G89.29 CHRONIC PAIN OF LEFT KNEE: Primary | ICD-10-CM

## 2018-08-16 DIAGNOSIS — M25.562 CHRONIC PAIN OF LEFT KNEE: Primary | ICD-10-CM

## 2018-08-16 PROCEDURE — 97112 NEUROMUSCULAR REEDUCATION: CPT

## 2018-08-16 PROCEDURE — 97110 THERAPEUTIC EXERCISES: CPT

## 2018-08-16 NOTE — PROGRESS NOTES
Daily Note     Today's date: 2018  Patient name: Marquis Lazo  : 1960  MRN: [id number]  Referring provider: Wilmer Dominguez MD  Dx:   Encounter Diagnosis     ICD-10-CM    1  Chronic pain of left knee M25 562     G89 29                   Subjective: Pt reported soreness along lateral knee  Objective: See treatment diary below  Exercise Diary  7/18  7/20  7/23  7/25  8/2  8/3  8/14  8/16   bike 8'  10'  10'  10'  10'  10'  10'  10'   Mini squats- pain free 2x10  2x10  2x15  HOLD  hold per MD [facility] per MD HOLD  hold   Step ups fwd/lat 2x15 6"  2x15 6'' ea  2x15 8" ea  8"   2x15 ea   8" 2x15 ea  8"  2x15 ea   8" 2x15 ea  8"   2x15 ea  Total gym lat squats 2x15  2x15 L7  2x20 L7  HOLD  HOLD per MD  HOLD  Per MD  HOLD  hold   rockerboard ap/ml 20ea  20 ea  20 ea  20ea/30"  20x/30"ea  30ea/30"  30 ea/30"  30 ea /30"   HR/TR 30ea  30 ea  30 ea  30 ea   30ea  30 ea   30 ea  30 ea  Bridges w/ tb 2x10 OTB  2x10 OTB  2x15 otb  OTB  3" 2x15  OTB 3"x25  OTB  3"x25  OTB 3"x25  OTb   3"x25   SLR flex in standing     SLR abduction  sidelying 2x10  2x10  2x15  2x15 ea   2x15ea flex-stand  flex:  2x15 ea      Abd:   2x15 ea   flex: 2x15 ea  abd 2x15   flex  2x15 ea    abd  2x15   clamshells 2x10 ea  2x10 ea  2x15 ea  2x15 ea   2x15ea  2x15 ea   2x15 ea   2x15 ea     leg press          55#, 2x10  55#  2x10 55# 2x10  55# x20                             Modalities                        CP prn                                 Assessment: Tolerated treatment well  Patient demonstrated fatigue post treatment and exhibited good technique with therapeutic exercises  Good tolerance with exercises  Some soreness noted with bike  Plan: Continue per plan of care

## 2018-08-22 ENCOUNTER — OFFICE VISIT (OUTPATIENT)
Dept: PHYSICAL THERAPY | Facility: REHABILITATION | Age: 58
End: 2018-08-22
Payer: COMMERCIAL

## 2018-08-22 DIAGNOSIS — G89.29 CHRONIC PAIN OF LEFT KNEE: Primary | ICD-10-CM

## 2018-08-22 DIAGNOSIS — M25.562 CHRONIC PAIN OF LEFT KNEE: Primary | ICD-10-CM

## 2018-08-22 PROCEDURE — 97110 THERAPEUTIC EXERCISES: CPT | Performed by: PHYSICAL THERAPIST

## 2018-08-22 PROCEDURE — 97112 NEUROMUSCULAR REEDUCATION: CPT | Performed by: PHYSICAL THERAPIST

## 2018-08-22 NOTE — PROGRESS NOTES
Daily Note     Today's date: 2018  Patient name: Loida Plunkett  : 1960  MRN: [id number]  Referring provider: Nikhil Miramontes MD  Dx:   Encounter Diagnosis     ICD-10-CM    1  Chronic pain of left knee M25 562     G89 29         1on1 entire session  Subjective: Pt reports her knee is not too bad today  Pt has finished her series of injections  Objective: See treatment diary below  Exercise Diary            bike 10'          Mini squats- pain free hold          Step ups fwd/lat 2x15 8"          Total gym lat squats hold          rockerboard ap/ml 30x/30"ea          HR/TR 30ea          Bridges w/ tb 3"x25 OTB          SLR flex in standing     SLR abduction  sidelying Flex 2x15    ABD 2x15          clamshells 2x15 ea           leg press  55# x20           SLS  3"x20                       Modalities                        CP prn                             Assessment: Tolerated treatment well  Patient exhibited good technique with therapeutic exercises  Pt was challenged with addition of SLS this session  No pain with TE performance  Plan: Pt to be d/c to ongoing HEP in 2 visits

## 2018-08-27 ENCOUNTER — APPOINTMENT (OUTPATIENT)
Dept: PHYSICAL THERAPY | Facility: REHABILITATION | Age: 58
End: 2018-08-27
Payer: COMMERCIAL

## 2018-08-30 ENCOUNTER — OFFICE VISIT (OUTPATIENT)
Dept: PHYSICAL THERAPY | Facility: REHABILITATION | Age: 58
End: 2018-08-30
Payer: COMMERCIAL

## 2018-08-30 DIAGNOSIS — M25.562 CHRONIC PAIN OF LEFT KNEE: Primary | ICD-10-CM

## 2018-08-30 DIAGNOSIS — G89.29 CHRONIC PAIN OF LEFT KNEE: Primary | ICD-10-CM

## 2018-08-30 PROCEDURE — G8991 OTHER PT/OT GOAL STATUS: HCPCS | Performed by: PHYSICAL THERAPIST

## 2018-08-30 PROCEDURE — 97110 THERAPEUTIC EXERCISES: CPT | Performed by: PHYSICAL THERAPIST

## 2018-08-30 PROCEDURE — G8992 OTHER PT/OT  D/C STATUS: HCPCS | Performed by: PHYSICAL THERAPIST

## 2018-08-30 NOTE — PROGRESS NOTES
PT Discharge    Today's date: 2018  Patient name: Luna Pike  : 1960  MRN: [id number]  Referring provider: Sasha Lynch MD  Dx:   Encounter Diagnosis     ICD-10-CM    1  Chronic pain of left knee M25 562     G89 29                   Assessment  Impairments: abnormal or restricted ROM, activity intolerance and pain with function    Assessment details: Pt has progressed well, demonstrating improvement in knee ROM, LE strength, and function with a decrease in pain since starting therapy  Pt will be d/c to ongoing HEP at this time  Thank you for the referral    Understanding of Dx/Px/POC: good   Prognosis: good    Goals  ST  Pt to decrease pain by 3 subjective ratings in 4 wks-met  2  Pt to increase grade of all MMTs by 1/2 in 4 wks-met  3  Pt to increase L knee ROM by 5 degrees in 4 wks-partially met  LT  Pt to increase FOTO score, > 60-partially met  2  Pt to be independent in HEP-met  3  Pt to be independent in al ADLs in 8 wks-met  4  Pt to ambulate for 30 min painfree in 8 wks    Plan  Treatment plan discussed with: patient        Subjective Evaluation    History of Present Illness  Mechanism of injury: Pt reports 90-95% improvement since starting therapy  Pt continues to report some pain/difficulty with squatting  Pt notes improvement with steps (performing reciprocally) and with her walking tolerance  Pt is not taking any pain medication      Pain  At best pain ratin  At worst pain ratin  Location: L knee    Treatments  Previous treatment: injection treatment  Current treatment: physical therapy  Patient Goals  Patient goals for therapy: decreased pain, increased motion, increased strength and independence with ADLs/IADLs          Objective     Active Range of Motion   Left Knee   Flexion: 142 degrees with pain  Extension: 2 degrees     Passive Range of Motion   Left Knee   Normal passive range of motion    Strength/Myotome Testing     Left Hip   Planes of Motion Flexion: 5  Extension: 5  Abduction: 4+    Left Knee   Flexion: 5  Extension: 5          Precautions: OA, asthma, HTN, back pain    Daily Treatment Diary   Exercise Diary  8/22 8/30               bike 10'  10'               Mini squats- pain free hold                 Step ups fwd/lat 2x15 8"                 Total gym lat squats hold                 rockerboard ap/ml 30x/30"ea                 HR/TR 30ea                 Bridges w/ tb 3"x25 OTB                 SLR flex in standing     SLR abduction  sidelying Flex 2x15     ABD 2x15                 clamshells 2x15 ea                  leg press  55# x20                  SLS  3"x20                       Modalities                        CP prn                           Updated measurements and functional status taken this session  Pt demonstrates good understanding of current TE's for HEP

## 2018-11-05 RX ORDER — CHLORHEXIDINE GLUCONATE 0.12 MG/ML
RINSE ORAL
Refills: 0 | COMMUNITY
Start: 2018-08-05

## 2018-11-05 RX ORDER — AMOXICILLIN 500 MG/1
CAPSULE ORAL
Refills: 0 | COMMUNITY
Start: 2018-08-05 | End: 2019-08-15

## 2018-11-05 RX ORDER — IBUPROFEN 600 MG/1
TABLET ORAL
Refills: 1 | COMMUNITY
Start: 2018-08-05 | End: 2019-06-27

## 2018-11-06 ENCOUNTER — OFFICE VISIT (OUTPATIENT)
Dept: OBGYN CLINIC | Facility: HOSPITAL | Age: 58
End: 2018-11-06
Payer: COMMERCIAL

## 2018-11-06 VITALS
WEIGHT: 189 LBS | HEIGHT: 63 IN | HEART RATE: 66 BPM | DIASTOLIC BLOOD PRESSURE: 84 MMHG | SYSTOLIC BLOOD PRESSURE: 144 MMHG | BODY MASS INDEX: 33.49 KG/M2

## 2018-11-06 DIAGNOSIS — M25.562 CHRONIC PAIN OF LEFT KNEE: Primary | ICD-10-CM

## 2018-11-06 DIAGNOSIS — M17.12 PRIMARY OSTEOARTHRITIS OF LEFT KNEE: ICD-10-CM

## 2018-11-06 DIAGNOSIS — G89.29 CHRONIC PAIN OF LEFT KNEE: Primary | ICD-10-CM

## 2018-11-06 PROCEDURE — 20610 DRAIN/INJ JOINT/BURSA W/O US: CPT | Performed by: ORTHOPAEDIC SURGERY

## 2018-11-06 PROCEDURE — 99213 OFFICE O/P EST LOW 20 MIN: CPT | Performed by: ORTHOPAEDIC SURGERY

## 2018-11-06 RX ORDER — BUPIVACAINE HYDROCHLORIDE 2.5 MG/ML
2 INJECTION, SOLUTION INFILTRATION; PERINEURAL
Status: COMPLETED | OUTPATIENT
Start: 2018-11-06 | End: 2018-11-06

## 2018-11-06 RX ORDER — BETAMETHASONE SODIUM PHOSPHATE AND BETAMETHASONE ACETATE 3; 3 MG/ML; MG/ML
12 INJECTION, SUSPENSION INTRA-ARTICULAR; INTRALESIONAL; INTRAMUSCULAR; SOFT TISSUE
Status: COMPLETED | OUTPATIENT
Start: 2018-11-06 | End: 2018-11-06

## 2018-11-06 RX ADMIN — BETAMETHASONE SODIUM PHOSPHATE AND BETAMETHASONE ACETATE 12 MG: 3; 3 INJECTION, SUSPENSION INTRA-ARTICULAR; INTRALESIONAL; INTRAMUSCULAR; SOFT TISSUE at 17:23

## 2018-11-06 RX ADMIN — BUPIVACAINE HYDROCHLORIDE 2 ML: 2.5 INJECTION, SOLUTION INFILTRATION; PERINEURAL at 17:23

## 2018-11-06 NOTE — PROGRESS NOTES
Assessment:  No diagnosis found  Plan:  The patient presents for 3 month follow up for left knee pain  She is s/p 3x viscosupplementation with several months relief  She complains of medial knee pain especially when standing prolonged  Previous x-rays show medial joint line narrowing  Today she received a left knee steroid injection and viscosupplemention is ordered  Follow up in 3 months for viscosupplement injection  To do next visit:  No Follow-up on file  The above stated was discussed in layman's terms and the patient expressed understanding  All questions were answered to the patient's satisfaction  Scribe Attestation    I,:   Dona Clifford am acting as a scribe while in the presence of the attending physician :        I,:   Bailee Pryor MD personally performed the services described in this documentation    as scribed in my presence :              Subjective:   Antwan Horton is a 60 y o  female who presents for left knee pain  She is s/p viscosupplementation 7/31/18 with several months of relief  Today she complains of left medial knee pain  She is tender to palpation in the medial joint line  She will use advil occasionally  She does her HEP  Being on her feet aggravates  She is a teacher  She is interested in a water exercise class for arthritis at a local gym        Review of systems negative unless otherwise specified in HPI    Past Medical History:   Diagnosis Date    Asthma     Disease of thyroid gland     Hashimoto's    Hypertension     Primary osteoarthritis of knee     Left - last assessed: Aug 23, 2017    Seasonal allergies        Past Surgical History:   Procedure Laterality Date    APPENDECTOMY      DIAGNOSTIC LAPAROSCOPY      HAND SURGERY Left     THYROIDECTOMY  1998    partial       Family History   Problem Relation Age of Onset    Hypertension Mother     Hyperlipidemia Mother     Skin cancer Mother     Diabetes Father     Heart attack Father  Stroke Father     Skin cancer Father     Breast cancer Maternal Aunt         > 48    Colon cancer Maternal Grandmother         >50       Social History     Occupational History    Not on file  Social History Main Topics    Smoking status: Never Smoker    Smokeless tobacco: Never Used    Alcohol use Yes    Drug use: No    Sexual activity: Not Currently     Birth control/ protection: Post-menopausal         Current Outpatient Prescriptions:     ALPRAZolam (XANAX) 0 25 mg tablet, TAKE 2 TABLETS DAILY FOR ANXIETY, Disp: , Rfl: 2    amoxicillin (AMOXIL) 500 mg capsule, TAKE 1 CAPSULE 1 HOUR PRIOR TO APPOINTMENT,STARTING DAY AFTER TAKE TWICE A DAY, Disp: , Rfl: 0    Brimonidine Tartrate (MIRVASO) 0 33 % GEL, Mirvaso 0 33 % topical gel, Disp: , Rfl:     Calcium Ascorbate 500 MG TABS, TAKE 1 TABLET DAILY FOR 50 DAYS, Disp: , Rfl:     chlorhexidine (PERIDEX) 0 12 % solution, RINSE 1/2 OUNCE TWICE A DAY FOR 30 SECONDS EXPECTORATE   NO NOT RINSE, Disp: , Rfl: 0    Cholecalciferol 08479 units CAPS, , Disp: , Rfl:     diclofenac sodium (VOLTAREN) 1 %, APPLY 2 GRAM TO THE AFFECTED AREA(S) BY TOPICAL ROUTE 4 TIMES PER DAY, Disp: , Rfl:     EPINEPHrine (EPIPEN 2-PHOENIX) 0 3 mg/0 3 mL SOAJ, EpiPen 2-[place] 0 3 mg/0 3 mL injection, auto-injector, Disp: , Rfl:     estradiol (ESTRACE VAGINAL) 0 1 mg/g vaginal cream, INSERT ONE GRAM DAILY VAGINALLY AT NIGHT FOR 14 DAYS, THEN TWICE WEEKLY FOR MAINTENANCE , Disp: , Rfl:     fexofenadine (ALLEGRA) 180 MG tablet, Take 180 mg by mouth daily  , Disp: , Rfl:     ibuprofen (MOTRIN) 200 mg tablet, Take by mouth, Disp: , Rfl:     ibuprofen (MOTRIN) 600 mg tablet, TAKE 1 TABLET 4 TIMES DAILY AS NEEDED PAIN/, Disp: , Rfl: 1    levalbuterol (XOPENEX HFA) 45 mcg/act inhaler, Xopenex HFA 45 mcg/actuation aerosol inhaler, Disp: , Rfl:     levothyroxine (SYNTHROID) 137 mcg tablet, Synthroid 137 mcg tablet, Disp: , Rfl:     levothyroxine 125 mcg tablet, Take 125 mcg by mouth daily  125 mcg & 137 mcg alternate days  , Disp: , Rfl:     lisinopril (ZESTRIL) 2 5 mg tablet, Daily, Disp: , Rfl:     mometasone (ASMANEX) 220 MCG/INH inhaler, Inhale 2 puffs daily, Disp: , Rfl:     mometasone (NASONEX) 50 mcg/act nasal spray, mometasone 50 mcg/actuation nasal spray, Disp: , Rfl:     olopatadine (PATANOL) 0 1 % ophthalmic solution, olopatadine 0 1 % eye drops, Disp: , Rfl:     ORACEA 40 MG capsule, Take 40 mg by mouth daily, Disp: , Rfl: 3    Salicylic Acid 6 % CREA, salicylic acid 6 % topical cream, Disp: , Rfl:     SODIUM FLUORIDE, DENTAL GEL, (PREVIDENT) 1 1 % GEL, PreviDent 5000 Booster Plus 1 1 % dental paste, Disp: , Rfl:     urea (CARMOL) 40 %, urea 40 % topical cream, Disp: , Rfl:     No Known Allergies         Vitals:    11/06/18 1650   BP: 144/84   Pulse: 66       Objective:          Physical Exam  Physical exam  · General: Awake, Alert, Oriented  · Eyes: Pupils equal, round and reactive to light  · Heart: regular rate and rhythm  · Lungs: No audible wheezing  · Abdomen: soft                    Ortho Exam   Left knee:  No erythema or effusion  TTP medial joint line  Sensation intact      Diagnostics, reviewed and taken today if performed as documented:    None performed      The attending physician has personally reviewed the pertinent films in PACS and interpretation is as follows:      Procedures, if performed today:    Large joint arthrocentesis  Date/Time: 11/6/2018 5:23 PM  Consent given by: patient  Site marked: site marked  Supporting Documentation  Indications: pain   Procedure Details  Location: knee - L knee  Needle size: 22 G  Approach: anterolateral  Medications administered: 2 mL bupivacaine 0 25 %; 12 mg betamethasone acetate-betamethasone sodium phosphate 6 (3-3) mg/mL (2ml 2% lidocaine)    Patient tolerance: patient tolerated the procedure well with no immediate complications  Dressing:  Sterile dressing applied             Portions of the record may have been created with voice recognition software   Occasional wrong word or "sound a like" substitutions may have occurred due to the inherent limitations of voice recognition software   Read the chart carefully and recognize, using context, where substitutions have occurred

## 2018-11-23 ENCOUNTER — HOSPITAL ENCOUNTER (OUTPATIENT)
Dept: RADIOLOGY | Age: 58
Discharge: HOME | End: 2018-11-23
Payer: COMMERCIAL

## 2018-11-23 VITALS — WEIGHT: 189 LBS | HEIGHT: 64 IN | BODY MASS INDEX: 32.27 KG/M2

## 2018-11-23 DIAGNOSIS — Z12.31 ENCOUNTER FOR SCREENING MAMMOGRAM FOR MALIGNANT NEOPLASM OF BREAST: ICD-10-CM

## 2018-11-23 PROCEDURE — 77067 SCR MAMMO BI INCL CAD: CPT

## 2019-04-01 RX ORDER — FLUTICASONE PROPIONATE 50 MCG
SPRAY, SUSPENSION (ML) NASAL
COMMUNITY

## 2019-04-01 RX ORDER — LOSARTAN POTASSIUM 25 MG/1
25 TABLET ORAL DAILY
Refills: 0 | COMMUNITY
Start: 2018-12-28 | End: 2019-06-27

## 2019-04-02 ENCOUNTER — OFFICE VISIT (OUTPATIENT)
Dept: OBGYN CLINIC | Facility: HOSPITAL | Age: 59
End: 2019-04-02
Payer: COMMERCIAL

## 2019-04-02 VITALS
BODY MASS INDEX: 33.12 KG/M2 | SYSTOLIC BLOOD PRESSURE: 138 MMHG | HEIGHT: 64 IN | WEIGHT: 194 LBS | DIASTOLIC BLOOD PRESSURE: 88 MMHG | HEART RATE: 85 BPM

## 2019-04-02 DIAGNOSIS — M25.562 CHRONIC PAIN OF LEFT KNEE: ICD-10-CM

## 2019-04-02 DIAGNOSIS — G89.29 CHRONIC PAIN OF LEFT KNEE: ICD-10-CM

## 2019-04-02 DIAGNOSIS — M17.12 PRIMARY OSTEOARTHRITIS OF LEFT KNEE: Primary | ICD-10-CM

## 2019-04-02 PROCEDURE — 20610 DRAIN/INJ JOINT/BURSA W/O US: CPT | Performed by: ORTHOPAEDIC SURGERY

## 2019-04-02 RX ORDER — LOSARTAN POTASSIUM 50 MG/1
TABLET ORAL
COMMUNITY
Start: 2019-04-01 | End: 2021-02-19

## 2019-04-02 RX ADMIN — Medication 20 MG: at 17:42

## 2019-04-04 RX ORDER — HYALURONATE SODIUM 10 MG/ML
20 SYRINGE (ML) INTRAARTICULAR
Status: COMPLETED | OUTPATIENT
Start: 2019-04-02 | End: 2019-04-02

## 2019-04-09 ENCOUNTER — OFFICE VISIT (OUTPATIENT)
Dept: OBGYN CLINIC | Facility: HOSPITAL | Age: 59
End: 2019-04-09
Payer: COMMERCIAL

## 2019-04-09 VITALS
HEART RATE: 90 BPM | HEIGHT: 64 IN | DIASTOLIC BLOOD PRESSURE: 84 MMHG | WEIGHT: 194 LBS | BODY MASS INDEX: 33.12 KG/M2 | SYSTOLIC BLOOD PRESSURE: 128 MMHG

## 2019-04-09 DIAGNOSIS — M17.12 PRIMARY OSTEOARTHRITIS OF LEFT KNEE: Primary | ICD-10-CM

## 2019-04-09 PROCEDURE — 20610 DRAIN/INJ JOINT/BURSA W/O US: CPT | Performed by: ORTHOPAEDIC SURGERY

## 2019-04-09 RX ORDER — HYALURONATE SODIUM 10 MG/ML
20 SYRINGE (ML) INTRAARTICULAR
Status: COMPLETED | OUTPATIENT
Start: 2019-04-09 | End: 2019-04-09

## 2019-04-09 RX ADMIN — Medication 20 MG: at 17:22

## 2019-04-16 ENCOUNTER — OFFICE VISIT (OUTPATIENT)
Dept: OBGYN CLINIC | Facility: HOSPITAL | Age: 59
End: 2019-04-16
Payer: COMMERCIAL

## 2019-04-16 VITALS
SYSTOLIC BLOOD PRESSURE: 136 MMHG | WEIGHT: 194 LBS | BODY MASS INDEX: 33.12 KG/M2 | DIASTOLIC BLOOD PRESSURE: 88 MMHG | HEART RATE: 94 BPM | HEIGHT: 64 IN

## 2019-04-16 DIAGNOSIS — M25.562 CHRONIC PAIN OF LEFT KNEE: ICD-10-CM

## 2019-04-16 DIAGNOSIS — G89.29 CHRONIC PAIN OF LEFT KNEE: ICD-10-CM

## 2019-04-16 DIAGNOSIS — M17.12 PRIMARY OSTEOARTHRITIS OF LEFT KNEE: Primary | ICD-10-CM

## 2019-04-16 PROCEDURE — 20610 DRAIN/INJ JOINT/BURSA W/O US: CPT | Performed by: ORTHOPAEDIC SURGERY

## 2019-04-16 RX ORDER — HYALURONATE SODIUM 10 MG/ML
20 SYRINGE (ML) INTRAARTICULAR
Status: COMPLETED | OUTPATIENT
Start: 2019-04-16 | End: 2019-04-16

## 2019-04-16 RX ORDER — ASCORBIC ACID 500 MG
TABLET ORAL
COMMUNITY

## 2019-04-16 RX ORDER — LIDOCAINE HYDROCHLORIDE 20 MG/ML
3 INJECTION, SOLUTION INFILTRATION; PERINEURAL
Status: COMPLETED | OUTPATIENT
Start: 2019-04-16 | End: 2019-04-16

## 2019-04-16 RX ADMIN — Medication 20 MG: at 17:30

## 2019-04-16 RX ADMIN — LIDOCAINE HYDROCHLORIDE 3 ML: 20 INJECTION, SOLUTION INFILTRATION; PERINEURAL at 17:30

## 2019-05-17 ENCOUNTER — TRANSCRIBE ORDERS (OUTPATIENT)
Dept: ADMINISTRATIVE | Facility: HOSPITAL | Age: 59
End: 2019-05-17

## 2019-05-17 DIAGNOSIS — R07.9 CHEST PAIN, UNSPECIFIED TYPE: Primary | ICD-10-CM

## 2019-06-19 LAB
CREAT ?TM UR-SCNC: 13 UMOL/L
HBA1C MFR BLD HPLC: 5.6 %
MICROALBUMIN/CREAT UR: NORMAL MG/G{CREAT}

## 2019-06-27 ENCOUNTER — ANNUAL EXAM (OUTPATIENT)
Dept: OBGYN CLINIC | Facility: CLINIC | Age: 59
End: 2019-06-27
Payer: COMMERCIAL

## 2019-06-27 VITALS
HEIGHT: 63 IN | DIASTOLIC BLOOD PRESSURE: 86 MMHG | SYSTOLIC BLOOD PRESSURE: 128 MMHG | WEIGHT: 188.1 LBS | HEART RATE: 88 BPM | OXYGEN SATURATION: 96 % | BODY MASS INDEX: 33.33 KG/M2

## 2019-06-27 DIAGNOSIS — Z01.411 ENCOUNTER FOR GYNECOLOGICAL EXAMINATION WITH ABNORMAL FINDING: Primary | ICD-10-CM

## 2019-06-27 DIAGNOSIS — Z12.31 ENCOUNTER FOR SCREENING MAMMOGRAM FOR BREAST CANCER: ICD-10-CM

## 2019-06-27 DIAGNOSIS — Z12.4 ENCOUNTER FOR PAPANICOLAOU SMEAR FOR CERVICAL CANCER SCREENING: ICD-10-CM

## 2019-06-27 DIAGNOSIS — N90.5 VULVAR ATROPHY: ICD-10-CM

## 2019-06-27 DIAGNOSIS — N81.10 PROLAPSE OF ANTERIOR VAGINAL WALL: ICD-10-CM

## 2019-06-27 DIAGNOSIS — Z11.51 SCREENING FOR HPV (HUMAN PAPILLOMAVIRUS): ICD-10-CM

## 2019-06-27 PROCEDURE — 99396 PREV VISIT EST AGE 40-64: CPT | Performed by: NURSE PRACTITIONER

## 2019-06-27 PROCEDURE — 87624 HPV HI-RISK TYP POOLED RSLT: CPT | Performed by: NURSE PRACTITIONER

## 2019-06-27 PROCEDURE — G0145 SCR C/V CYTO,THINLAYER,RESCR: HCPCS | Performed by: NURSE PRACTITIONER

## 2019-06-27 RX ORDER — LABETALOL 100 MG/1
TABLET, FILM COATED ORAL
COMMUNITY
Start: 2019-06-15

## 2019-07-02 LAB
HPV HR 12 DNA CVX QL NAA+PROBE: NEGATIVE
HPV16 DNA CVX QL NAA+PROBE: NEGATIVE
HPV18 DNA CVX QL NAA+PROBE: NEGATIVE

## 2019-07-03 LAB
LAB AP GYN PRIMARY INTERPRETATION: NORMAL
Lab: NORMAL

## 2019-07-09 ENCOUNTER — OFFICE VISIT (OUTPATIENT)
Dept: OBGYN CLINIC | Facility: CLINIC | Age: 59
End: 2019-07-09
Payer: COMMERCIAL

## 2019-07-09 VITALS
BODY MASS INDEX: 33.31 KG/M2 | WEIGHT: 188 LBS | HEIGHT: 63 IN | DIASTOLIC BLOOD PRESSURE: 68 MMHG | SYSTOLIC BLOOD PRESSURE: 126 MMHG

## 2019-07-09 DIAGNOSIS — N81.10 PROLAPSE OF ANTERIOR VAGINAL WALL: Primary | ICD-10-CM

## 2019-07-09 DIAGNOSIS — N39.3 STRESS INCONTINENCE IN FEMALE: ICD-10-CM

## 2019-07-09 PROCEDURE — 99213 OFFICE O/P EST LOW 20 MIN: CPT | Performed by: NURSE PRACTITIONER

## 2019-07-09 PROCEDURE — 57160 INSERT PESSARY/OTHER DEVICE: CPT | Performed by: NURSE PRACTITIONER

## 2019-07-09 RX ORDER — MOMETASONE FUROATE 110 UG/1
1 INHALANT RESPIRATORY (INHALATION) DAILY
Refills: 1 | COMMUNITY
Start: 2019-06-28

## 2019-07-09 NOTE — PROGRESS NOTES
Assessment/Plan:    1  Prolapse of anterior vaginal wall  2nd degree with valsalva  Fitted with #4 ring pessary with support  Order placed for same  When received we will have patient RTO for placement and to learn how to remove and replace herself  Prn referral to Urogyn if not satisfied with management  - Ambulatory referral to Physical Therapy; Future    2  Stress incontinence in female  Discussed management options, ie, kegels, pessary, PFM rehab or surgical   Patient is interested in PFM therapy in conjunctions with pessary  Referral placed  Prn referral to urogyn if not satisfied with management  - Ambulatory referral to Physical Therapy; Future      Subjective:      Patient ID: Marcy Rojo is a 60 y o  female  HPI  PROBLEM VISIT  CC: vaginal prolapse/ stress urinary incontinence    61 yo postmenopausal  presents for evaluation/ management of vaginal prolapse and stress urinary incontinence  States that she can feel a bulge at opening of vagina, especially by the end of the day  Finds it uncomfortable/ heavy in the area  Denies trouble passing urine or bowel movements  Also complains of urinary leakage with laughing, coughing, sneezing, exercise  Finds this bothersome to her quality of life  Hx of two vaginal births  Med/surg histories otherwise non-contributory      The following portions of the patient's history were reviewed and updated as appropriate: allergies, current medications, past family history, past medical history, past social history, past surgical history and problem list     Review of Systems   Constitutional: Negative for chills and fever  Gastrointestinal: Negative for abdominal distention, abdominal pain, blood in stool, constipation, diarrhea, nausea and vomiting  Genitourinary: Negative for difficulty urinating, dysuria, frequency, genital sores, hematuria, menstrual problem, pelvic pain, urgency, vaginal bleeding and vaginal discharge  Heaviness/ discomfort with prolapse of vaginal wall, dai by end of day, can feel bulge  Stress urinary incontinence  Objective:    /68 (BP Location: Left arm, Patient Position: Sitting, Cuff Size: Standard)   Ht 5' 3" (1 6 m)   Wt 85 3 kg (188 lb)   LMP  (LMP Unknown)   BMI 33 30 kg/m²      Physical Exam   Constitutional: She is oriented to person, place, and time  She appears well-developed and well-nourished  No distress  HENT:   Head: Normocephalic and atraumatic  Eyes: Pupils are equal, round, and reactive to light  Pulmonary/Chest: Effort normal    Genitourinary: Pelvic exam was performed with patient supine  There is no rash, tenderness, lesion or injury on the right labia  There is no rash, tenderness, lesion or injury on the left labia  Uterus is not enlarged and not tender  Cervix exhibits no motion tenderness, no discharge and no friability  Right adnexum displays no mass and no tenderness  Left adnexum displays no mass and no tenderness  No erythema, tenderness or bleeding in the vagina  No foreign body in the vagina  No signs of injury around the vagina  No vaginal discharge found  Genitourinary Comments: 2nd degree anterior wall prolapse with valsalva  UVJ hypermobile  + urinary leakage with cough   Lymphadenopathy:        Right: No inguinal adenopathy present  Left: No inguinal adenopathy present  Neurological: She is alert and oriented to person, place, and time  Psychiatric: She has a normal mood and affect   Her behavior is normal  Thought content normal          Pessary  Date/Time: 7/9/2019 10:51 AM  Performed by: JOELLEN Marinelli  Authorized by: JOELLEN Marinelli     Consent:     Consent obtained:  Verbal    Consent given by:  Patient    Patient questions answered: yes      Patient agrees, verbalizes understanding, and wants to proceed: yes      Instructions and paperwork completed: yes    Indication:     Indication for pessary: vaginal vault prolapse and incontinence    Pre-procedure:     Pessary procedure type:  Fitting  Problems:     Pessary complications: none    Procedure:     Pessary type:  Ring w/ support    Pessary size:  4    Patient tolerance of procedure: Tolerated well, no immediate complications  Comments:     Procedure comments:  Fitted with #4 ring pessary with support with adequate reduction of prolapse and without discomfort to patient  Order placed for #4 ring with support  When received we will call patient to schedule RV for placement and to teach her how to remove and reinsert

## 2019-07-16 ENCOUNTER — TELEPHONE (OUTPATIENT)
Dept: OBGYN CLINIC | Facility: HOSPITAL | Age: 59
End: 2019-07-16

## 2019-07-16 ENCOUNTER — OFFICE VISIT (OUTPATIENT)
Dept: OBGYN CLINIC | Facility: HOSPITAL | Age: 59
End: 2019-07-16
Payer: COMMERCIAL

## 2019-07-16 VITALS
HEIGHT: 63 IN | WEIGHT: 187.8 LBS | HEART RATE: 72 BPM | DIASTOLIC BLOOD PRESSURE: 85 MMHG | SYSTOLIC BLOOD PRESSURE: 132 MMHG | BODY MASS INDEX: 33.27 KG/M2

## 2019-07-16 DIAGNOSIS — G89.29 CHRONIC PAIN OF LEFT KNEE: ICD-10-CM

## 2019-07-16 DIAGNOSIS — M25.562 CHRONIC PAIN OF LEFT KNEE: ICD-10-CM

## 2019-07-16 DIAGNOSIS — M25.562 ACUTE PAIN OF LEFT KNEE: ICD-10-CM

## 2019-07-16 DIAGNOSIS — M17.12 PRIMARY OSTEOARTHRITIS OF LEFT KNEE: Primary | ICD-10-CM

## 2019-07-16 PROCEDURE — 20610 DRAIN/INJ JOINT/BURSA W/O US: CPT | Performed by: ORTHOPAEDIC SURGERY

## 2019-07-16 PROCEDURE — 99213 OFFICE O/P EST LOW 20 MIN: CPT | Performed by: ORTHOPAEDIC SURGERY

## 2019-07-16 RX ORDER — BUPIVACAINE HYDROCHLORIDE 2.5 MG/ML
2 INJECTION, SOLUTION INFILTRATION; PERINEURAL
Status: COMPLETED | OUTPATIENT
Start: 2019-07-16 | End: 2019-07-16

## 2019-07-16 RX ORDER — LIDOCAINE HYDROCHLORIDE 20 MG/ML
2 INJECTION, SOLUTION EPIDURAL; INFILTRATION; INTRACAUDAL; PERINEURAL
Status: COMPLETED | OUTPATIENT
Start: 2019-07-16 | End: 2019-07-16

## 2019-07-16 RX ORDER — BETAMETHASONE SODIUM PHOSPHATE AND BETAMETHASONE ACETATE 3; 3 MG/ML; MG/ML
12 INJECTION, SUSPENSION INTRA-ARTICULAR; INTRALESIONAL; INTRAMUSCULAR; SOFT TISSUE
Status: COMPLETED | OUTPATIENT
Start: 2019-07-16 | End: 2019-07-16

## 2019-07-16 RX ADMIN — LIDOCAINE HYDROCHLORIDE 2 ML: 20 INJECTION, SOLUTION EPIDURAL; INFILTRATION; INTRACAUDAL; PERINEURAL at 17:17

## 2019-07-16 RX ADMIN — BUPIVACAINE HYDROCHLORIDE 2 ML: 2.5 INJECTION, SOLUTION INFILTRATION; PERINEURAL at 17:17

## 2019-07-16 RX ADMIN — BETAMETHASONE SODIUM PHOSPHATE AND BETAMETHASONE ACETATE 12 MG: 3; 3 INJECTION, SUSPENSION INTRA-ARTICULAR; INTRALESIONAL; INTRAMUSCULAR; SOFT TISSUE at 17:17

## 2019-07-16 NOTE — TELEPHONE ENCOUNTER
Dr Sue Perez    Patient was walking this morning and heard a pop in her L knee  She has an appt this afternoon but is having trouble walking  She would like to talk with a nurse to make sure she is doing what she needs to do

## 2019-07-16 NOTE — PROGRESS NOTES
Assessment:  1  Primary osteoarthritis of left knee  Injection procedure prior authorization   2  Chronic pain of left knee  Injection procedure prior authorization   3  Acute pain of left knee         Plan:  The patient likely had a bony collision of articulating surfaces aggravating her left knee  She is unlikely to have had neither tendon, vascular tissue injury nor baker's cyst rupture  She is provided with left knee steroid injection  She should maintain range of motion with gentle ROM exercises  She should follow up 3 months  To do next visit:  Return in about 3 months (around 10/16/2019)  The above stated was discussed in layman's terms and the patient expressed understanding  All questions were answered to the patient's satisfaction  Scribe Attestation    I,:   Jerry Lopez am acting as a scribe while in the presence of the attending physician :        I,:   Bernie Oh MD personally performed the services described in this documentation    as scribed in my presence :              Subjective:   Jeannie Xiao is a 60 y o  female who presents for follow up of left knee pain with acute injury this morning  She had been walking on an incline and heard a cracking noise with onset of sharp pain  Today she complains medial, central and lateral left knee pain with some lateral hip pain  She rates her pain at 4/10  She can feel stiff  Weight bearing and walking aggravates  She has used advil and tylenol with some benefit  She is now using a cane for ambulation          Review of systems negative unless otherwise specified in HPI    Past Medical History:   Diagnosis Date    Asthma     Disease of thyroid gland     Hashimoto's    Hypertension     Primary osteoarthritis of knee     Left - last assessed: Aug 23, 2017    Seasonal allergies        Past Surgical History:   Procedure Laterality Date    APPENDECTOMY      DIAGNOSTIC LAPAROSCOPY      HAND SURGERY Left     THYROIDECTOMY 1998    partial       Family History   Problem Relation Age of Onset    Hypertension Mother     Hyperlipidemia Mother     Skin cancer Mother    [facility] Stroke Mother     Diabetes Father     Heart attack Father     Stroke Father     Skin cancer Father     Breast cancer Maternal Aunt         > 48    Colon cancer Maternal Grandmother         >50       Social History     Occupational History    Not on file   Tobacco Use    Smoking status: Never Smoker    Smokeless tobacco: Never Used   Substance and Sexual Activity    Alcohol use: Yes    Drug use: No    Sexual activity: Not Currently     Birth control/protection: Post-menopausal         Current Outpatient Medications:     ALPRAZolam (XANAX) 0 25 mg tablet, TAKE 2 TABLETS DAILY FOR ANXIETY, Disp: , Rfl: 2    amoxicillin (AMOXIL) 500 mg capsule, TAKE 1 CAPSULE 1 HOUR PRIOR TO APPOINTMENT,STARTING DAY AFTER TAKE TWICE A DAY, Disp: , Rfl: 0    ascorbic acid (VITAMIN C) 500 mg tablet, Vitamin C 500 mg tablet  TAKE 1 TABLET DAILY FOR 50 DAYS, Disp: , Rfl:     ASMANEX 30 METERED DOSES 110 MCG/INH AEPB inhaler, Inhale 1 puff daily, Disp: , Rfl: 1    Brimonidine Tartrate (MIRVASO) 0 33 % GEL, Mirvaso 0 33 % topical gel, Disp: , Rfl:     Brimonidine Tartrate (MIRVASO) 0 33 % GEL, Mirvaso 0 33 % topical gel, Disp: , Rfl:     Calcium Ascorbate 500 MG TABS, TAKE 1 TABLET DAILY FOR 50 DAYS, Disp: , Rfl:     chlorhexidine (PERIDEX) 0 12 % solution, RINSE 1/2 OUNCE TWICE A DAY FOR 30 SECONDS EXPECTORATE    NO NOT RINSE, Disp: , Rfl: 0    Cholecalciferol (VITAMIN D-3) 5000 units TABS, , Disp: , Rfl:     Cholecalciferol 66691 units CAPS, , Disp: , Rfl:     EPINEPHrine (EPIPEN 2-PHOENIX) 0 3 mg/0 3 mL SOAJ, EpiPen 2-[place] 0 3 mg/0 3 mL injection, auto-injector, Disp: , Rfl:     estradiol (ESTRACE VAGINAL) 0 1 mg/g vaginal cream, INSERT ONE GRAM DAILY VAGINALLY AT NIGHT FOR 14 DAYS, THEN TWICE WEEKLY FOR MAINTENANCE , Disp: , Rfl:     fexofenadine (ALLEGRA) 180 MG tablet, Take 180 mg by mouth daily  , Disp: , Rfl:     fluticasone (FLONASE) 50 mcg/act nasal spray, fluticasone propionate 50 mcg/actuation nasal spray,suspension, Disp: , Rfl:     labetalol (NORMODYNE) 100 mg tablet, , Disp: , Rfl:     levothyroxine (SYNTHROID) 137 mcg tablet, Synthroid 137 mcg tablet, Disp: , Rfl:     levothyroxine 125 mcg tablet, Take 125 mcg by mouth daily  125 mcg & 137 mcg alternate days  , Disp: , Rfl:     losartan (COZAAR) 50 mg tablet, , Disp: , Rfl:     mometasone (NASONEX) 50 mcg/act nasal spray, mometasone 50 mcg/actuation nasal spray, Disp: , Rfl:     olopatadine (PATANOL) 0 1 % ophthalmic solution, olopatadine 0 1 % eye drops, Disp: , Rfl:     ORACEA 40 MG capsule, Take 40 mg by mouth daily, Disp: , Rfl: 3    Salicylic Acid 6 % CREA, salicylic acid 6 % topical cream, Disp: , Rfl:     urea (CARMOL) 40 %, urea 40 % topical cream, Disp: , Rfl:     No Known Allergies         Vitals:    07/16/19 1647   BP: 132/85   Pulse: 72       Objective:  Physical exam  · General: Awake, Alert, Oriented  · Eyes: Pupils equal, round and reactive to light  · Heart: regular rate and rhythm  · Lungs: No audible wheezing  · Abdomen: soft                    Ortho Exam   Left knee:  No erythema or ecchymosis  No effusion or swelling  Normal strength  Good ROM   Calf compartments soft and supple  Sensation intact  Toes are warm sensate and mobile    Diagnostics, reviewed and taken today if performed as documented:    None performed     Procedures, if performed today:    Large joint arthrocentesis: L knee  Date/Time: 7/16/2019 5:17 PM  Consent given by: patient  Site marked: site marked  Supporting Documentation  Indications: pain   Procedure Details  Location: knee - L knee  Preparation: Patient was prepped and draped in the usual sterile fashion  Needle size: 22 G  Ultrasound guidance: no  Approach: anterolateral  Medications administered: 12 mg betamethasone acetate-betamethasone sodium phosphate 6 (3-3) mg/mL; 2 mL bupivacaine 0 25 %; 2 mL lidocaine (PF) 2 %    Patient tolerance: patient tolerated the procedure well with no immediate complications  Dressing:  Sterile dressing applied          Portions of the record may have been created with voice recognition software  Occasional wrong word or "sound a like" substitutions may have occurred due to the inherent limitations of voice recognition software  Read the chart carefully and recognize, using context, where substitutions have occurred

## 2019-07-16 NOTE — TELEPHONE ENCOUNTER
I spoke with patient and advised RICE method, NSAIDS alternating with tylenol, off load with cane or crutch  Call office if worsening of symptoms   Patient has appt for 4:30pm

## 2019-07-22 ENCOUNTER — TELEPHONE (OUTPATIENT)
Dept: OBGYN CLINIC | Facility: CLINIC | Age: 59
End: 2019-07-22

## 2019-08-15 ENCOUNTER — OFFICE VISIT (OUTPATIENT)
Dept: PODIATRY | Facility: CLINIC | Age: 59
End: 2019-08-15
Payer: COMMERCIAL

## 2019-08-15 VITALS
HEIGHT: 63 IN | DIASTOLIC BLOOD PRESSURE: 86 MMHG | BODY MASS INDEX: 33.63 KG/M2 | SYSTOLIC BLOOD PRESSURE: 142 MMHG | WEIGHT: 189.8 LBS | HEART RATE: 91 BPM

## 2019-08-15 DIAGNOSIS — S90.32XA CONTUSION OF LEFT FOOT, INITIAL ENCOUNTER: Primary | ICD-10-CM

## 2019-08-15 PROCEDURE — 99213 OFFICE O/P EST LOW 20 MIN: CPT | Performed by: PODIATRIST
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
## 2019-08-20 ENCOUNTER — OFFICE VISIT (OUTPATIENT)
Dept: OBGYN CLINIC | Facility: CLINIC | Age: 59
End: 2019-08-20
Payer: COMMERCIAL

## 2019-08-20 VITALS
WEIGHT: 188 LBS | BODY MASS INDEX: 33.31 KG/M2 | DIASTOLIC BLOOD PRESSURE: 74 MMHG | HEIGHT: 63 IN | SYSTOLIC BLOOD PRESSURE: 132 MMHG

## 2019-08-20 DIAGNOSIS — N81.10 PROLAPSE OF ANTERIOR VAGINAL WALL: Primary | ICD-10-CM

## 2019-08-20 PROCEDURE — 99213 OFFICE O/P EST LOW 20 MIN: CPT | Performed by: NURSE PRACTITIONER

## 2019-08-20 NOTE — PROGRESS NOTES
Assessment/Plan:    1  Prolapse of anterior vaginal wall  #4 ring pessary placed  Attempted to teach patient removal and replacement of pessary  Patient was not able to remove the pessary on her own in the office today  She was able to reinsert it after I removed it  She will practice at home and if she is not able to remove it on her own she will come back here for ongoing management  She has also initiated contact for pelvic floor PT for help with SABAS  I have spent 15 minutes with Patient  today in which greater than 50% of this time was spent in counseling/coordination of care regarding Intructions for management and Patient and family education  Subjective:      Patient ID: Abigail Mendez is a 60 y o  female  HPI  PESSARY FOLLOW UP    Patient was fitted with a #4 ring pessary for management of anterior vaginal wall prolapse  Today she presents for placement of the new ring which was ordered for her  The following portions of the patient's history were reviewed and updated as appropriate: allergies, current medications, past family history, past medical history, past social history, past surgical history and problem list     Review of Systems   Constitutional: Negative for chills and fever  Genitourinary: Negative  Objective:    /74 (BP Location: Left arm, Patient Position: Sitting, Cuff Size: Standard)   Ht 5' 3" (1 6 m)   Wt 85 3 kg (188 lb)   LMP  (LMP Unknown)   BMI 33 30 kg/m²      Physical Exam   Constitutional: She is oriented to person, place, and time  She appears well-developed and well-nourished  No distress  HENT:   Head: Normocephalic and atraumatic  Eyes: Pupils are equal, round, and reactive to light  Pulmonary/Chest: Effort normal    Genitourinary: Pelvic exam was performed with patient supine  There is no rash, tenderness, lesion or injury on the right labia  There is no rash, tenderness, lesion or injury on the left labia     Neurological: She is alert and oriented to person, place, and time  Psychiatric: She has a normal mood and affect   Her behavior is normal  Thought content normal

## 2019-08-22 ENCOUNTER — OFFICE VISIT (OUTPATIENT)
Dept: OBGYN CLINIC | Facility: CLINIC | Age: 59
End: 2019-08-22
Payer: COMMERCIAL

## 2019-08-22 VITALS
HEIGHT: 63 IN | BODY MASS INDEX: 33.35 KG/M2 | SYSTOLIC BLOOD PRESSURE: 134 MMHG | WEIGHT: 188.2 LBS | DIASTOLIC BLOOD PRESSURE: 76 MMHG

## 2019-08-22 DIAGNOSIS — N81.10 PROLAPSE OF ANTERIOR VAGINAL WALL: Primary | ICD-10-CM

## 2019-08-22 PROCEDURE — 57160 INSERT PESSARY/OTHER DEVICE: CPT | Performed by: NURSE PRACTITIONER

## 2019-08-22 NOTE — PROGRESS NOTES
Assessment/Plan:    1  Prolapse of anterior vaginal wall  #4 ring pessary placed two days ago  Patient unable to remove on her own  She has decided to forgo pessary management at this time  Pessary was removed, cleansed and given to patient  She will follow up if she desires to give it another try  Subjective:      Patient ID: Jeremiah Hunter is a 60 y o  female  HPI  FOLLOW UP    Patient unable to remove pessary on her own at home  She has decided she does not want to use a pessary at this time  Requests removal     The following portions of the patient's history were reviewed and updated as appropriate: allergies, current medications, past family history, past medical history, past social history, past surgical history and problem list     Review of Systems    N/A    Objective:    /76 (BP Location: Left arm, Patient Position: Sitting, Cuff Size: Standard)   Ht 5' 3" (1 6 m)   Wt 85 4 kg (188 lb 3 2 oz)   LMP  (LMP Unknown)   BMI 33 34 kg/m²      Physical Exam    N/A    #4 RING PESSARY removed with ease  Washed, dried, given to patient

## 2019-10-01 ENCOUNTER — TELEPHONE (OUTPATIENT)
Dept: OBGYN CLINIC | Facility: HOSPITAL | Age: 59
End: 2019-10-01

## 2019-10-01 NOTE — TELEPHONE ENCOUNTER
Caller: patient  Call back number: [phone]  Patient's doctor: Dr Zoey Calle    Patient called to check the status of the injections  She would be due in mid October

## 2019-10-04 NOTE — TELEPHONE ENCOUNTER
Called and spoke to patient  Explained last injection was 4/16/19  We can submit to insurance for approval 10/16/19 once it has been atleast 6 months  She understood

## 2019-10-09 ENCOUNTER — HOSPITAL ENCOUNTER (EMERGENCY)
Facility: HOSPITAL | Age: 59
Discharge: HOME | End: 2019-10-09
Attending: EMERGENCY MEDICINE
Payer: COMMERCIAL

## 2019-10-09 VITALS
SYSTOLIC BLOOD PRESSURE: 154 MMHG | TEMPERATURE: 98.2 F | BODY MASS INDEX: 32.24 KG/M2 | OXYGEN SATURATION: 98 % | HEART RATE: 75 BPM | WEIGHT: 182 LBS | RESPIRATION RATE: 16 BRPM | DIASTOLIC BLOOD PRESSURE: 94 MMHG

## 2019-10-09 DIAGNOSIS — R04.0 LEFT-SIDED EPISTAXIS: Primary | ICD-10-CM

## 2019-10-09 PROCEDURE — 99283 EMERGENCY DEPT VISIT LOW MDM: CPT

## 2019-10-09 PROCEDURE — 99282 EMERGENCY DEPT VISIT SF MDM: CPT | Performed by: PHYSICIAN ASSISTANT

## 2019-10-09 RX ORDER — OXYMETAZOLINE HYDROCHLORIDE 0.05 G/100ML
2 SPRAY NASAL ONCE
Status: DISCONTINUED | OUTPATIENT
Start: 2019-10-09 | End: 2019-10-09 | Stop reason: HOSPADM

## 2019-10-09 NOTE — TELEPHONE ENCOUNTER
Patient states that Malcom Lynne told her she would call her back Monday, No note as to why, Please return her call

## 2019-10-10 NOTE — ED PROVIDER NOTES
History  Chief Complaint   Patient presents with    Nose Bleed     pt  comes in c/o nose bleed that started around 7pm and only was bleeding for about 15 minutes but was concerned about a clot that she blew out of her nose  C/o a mild headache at this time  Laura Pierson is a 60 y o  female who presents to the ED with complaints of left-sided nose bleed  Patient states she had a nose bleed today with 1 large clot  Patient states bleeding was controlled with pressure at home and she is currently not having bleednig  Patient states she has had nasal congestion and rhinorrhea from the left nose over the past 2 weeks  Patient was recently treated for strep throat and is currently taking Amoxicillin  Patient states she used Nasocort nasal spray and nasal saline spray daily  Patient states she is also taking Allegra  History provided by:  Patient  Nose Bleed   Location:  L nare  Severity:  Moderate  Duration:  20 minutes  Context: weather change    Context: not anticoagulants, not aspirin use, not bleeding disorder, not drug use, not home oxygen and not trauma    Associated symptoms: congestion and sinus pain    Associated symptoms: no blood in oropharynx, no cough, no dizziness, no facial pain, no fever, no headaches, no sneezing, no sore throat and no syncope        Prior to Admission Medications   Prescriptions Last Dose Informant Patient Reported? Taking?    ALPRAZolam (XANAX) 0 25 mg tablet  Self Yes No   Sig: TAKE 2 TABLETS DAILY FOR ANXIETY   ASMANEX 30 METERED DOSES 110 MCG/INH AEPB inhaler   Yes No   Sig: Inhale 1 puff daily   Brimonidine Tartrate (MIRVASO) 0 33 % GEL   Yes No   Sig: Mirvaso 0 33 % topical gel   Brimonidine Tartrate (MIRVASO) 0 33 % GEL   Yes No   Sig: Mirvaso 0 33 % topical gel   Calcium Ascorbate 500 MG TABS   Yes No   Sig: TAKE 1 TABLET DAILY FOR 50 DAYS   Cholecalciferol (VITAMIN D-3) 5000 units TABS   Yes No   Cholecalciferol 40793 units CAPS  Self Yes No   EPINEPHrine (EPIPEN 2-PHOENIX) 0 3 mg/0 3 mL SOAJ   Yes No   Sig: EpiPen 2-Phoenix 0 3 mg/0 3 mL injection, auto-injector   ORACEA 40 MG capsule   Yes No   Sig: Take 40 mg by mouth daily   Salicylic Acid 6 % CREA   Yes No   Sig: salicylic acid 6 % topical cream   ascorbic acid (VITAMIN C) 500 mg tablet   Yes No   Sig: Vitamin C 500 mg tablet   TAKE 1 TABLET DAILY FOR 50 DAYS   chlorhexidine (PERIDEX) 0 12 % solution   Yes No   Sig: RINSE 1/2 OUNCE TWICE A DAY FOR 30 SECONDS EXPECTORATE   NO NOT RINSE   estradiol (ESTRACE VAGINAL) 0 1 mg/g vaginal cream   Yes No   Sig: INSERT ONE GRAM DAILY VAGINALLY AT NIGHT FOR 14 DAYS, THEN TWICE WEEKLY FOR MAINTENANCE  fexofenadine (ALLEGRA) 180 MG tablet  Self Yes No   Sig: Take 180 mg by mouth daily  fluticasone (FLONASE) 50 mcg/act nasal spray   Yes No   Sig: fluticasone propionate 50 mcg/actuation nasal spray,suspension   labetalol (NORMODYNE) 100 mg tablet   Yes No   levothyroxine (SYNTHROID) 137 mcg tablet   Yes No   Sig: Synthroid 137 mcg tablet   levothyroxine 125 mcg tablet  Self Yes No   Sig: Take 125 mcg by mouth daily  125 mcg & 137 mcg alternate days     losartan (COZAAR) 50 mg tablet   Yes No   mometasone (NASONEX) 50 mcg/act nasal spray   Yes No   Sig: mometasone 50 mcg/actuation nasal spray   olopatadine (PATANOL) 0 1 % ophthalmic solution   Yes No   Sig: olopatadine 0 1 % eye drops   urea (CARMOL) 40 %   Yes No   Sig: urea 40 % topical cream      Facility-Administered Medications: None       Past Medical History:   Diagnosis Date    Asthma     Disease of thyroid gland     Hashimoto's    Fibroid     Hypertension     Primary osteoarthritis of knee     Left - last assessed: Aug 23, 2017    Seasonal allergies     Varicella        Past Surgical History:   Procedure Laterality Date    APPENDECTOMY      DIAGNOSTIC LAPAROSCOPY      HAND SURGERY Left     THYROIDECTOMY  1998    partial       Family History   Problem Relation Age of Onset    Hypertension Mother     Hyperlipidemia Mother     Skin cancer Mother     Stroke Mother     Diabetes Father     Heart attack Father     Stroke Father     Skin cancer Father     Breast cancer Maternal Aunt         > 48    Colon cancer Maternal Grandmother         >50     I have reviewed and agree with the history as documented  Social History     Tobacco Use    Smoking status: Never Smoker    Smokeless tobacco: Never Used   Substance Use Topics    Alcohol use: Yes    Drug use: No        Review of Systems   Constitutional: Negative for appetite change, chills, fever and unexpected weight change  HENT: Positive for congestion, nosebleeds and sinus pain  Negative for drooling, ear pain, rhinorrhea, sneezing, sore throat, trouble swallowing and voice change  Eyes: Negative for pain, discharge, redness and visual disturbance  Respiratory: Negative for cough, shortness of breath, wheezing and stridor  Cardiovascular: Negative for chest pain, palpitations, leg swelling and syncope  Gastrointestinal: Negative for abdominal pain, blood in stool, constipation, diarrhea, nausea and vomiting  Genitourinary: Negative for dysuria, flank pain, frequency, hematuria and urgency  Musculoskeletal: Negative for gait problem, joint swelling, neck pain and neck stiffness  Skin: Negative for color change and rash  Neurological: Negative for dizziness, seizures, light-headedness and headaches  Physical Exam  Physical Exam   Constitutional: She is oriented to person, place, and time  She appears well-developed and well-nourished  HENT:   Head: Normocephalic and atraumatic  Nose: Mucosal edema present  No epistaxis  Right sinus exhibits maxillary sinus tenderness and frontal sinus tenderness  Left sinus exhibits maxillary sinus tenderness and frontal sinus tenderness  Mouth/Throat: Oropharynx is clear and moist    Eyes: Pupils are equal, round, and reactive to light   Conjunctivae and EOM are normal    Cardiovascular: Normal rate, regular rhythm and intact distal pulses  Pulmonary/Chest: Effort normal and breath sounds normal    Musculoskeletal: Normal range of motion  Neurological: She is alert and oriented to person, place, and time  Skin: Skin is warm and dry  Capillary refill takes less than 2 seconds  Psychiatric: She has a normal mood and affect  Nursing note and vitals reviewed  Vital Signs  ED Triage Vitals [10/09/19 2001]   Temperature Pulse Respirations Blood Pressure SpO2   98 2 °F (36 8 °C) 75 16 154/94 98 %      Temp Source Heart Rate Source Patient Position - Orthostatic VS BP Location FiO2 (%)   Oral Monitor Sitting Right arm --      Pain Score       --           Vitals:    10/09/19 2001   BP: 154/94   Pulse: 75   Patient Position - Orthostatic VS: Sitting         Visual Acuity      ED Medications  Medications   oxymetazoline (AFRIN) 0 05 % nasal spray 2 spray (has no administration in time range)       Diagnostic Studies  Results Reviewed     None                 No orders to display              Procedures  Procedures       ED Course  ED Course as of Oct 09 2046   Wed Oct 09, 2019   2018 Patient is not actively bleeding  2018 Educated patient regarding diagnosis and management  Advised patient to follow up with PCP  Advised patient to RTER for persistent or worsening symptoms  MDM  Number of Diagnoses or Management Options  Left-sided epistaxis: new and does not require workup  Diagnosis management comments: Yadira Miller is a 60 y o  female who presents to the ED with complaints of left-sided nose bleed  Patient states she had a nose bleed today with 1 large clot  Patient states bleeding was controlled with pressure at home and she is currently not having bleednig  Patient states she has had nasal congestion and rhinorrhea from the left nose over the past 2 weeks  Patient was recently treated for strep throat and is currently taking Amoxicillin   Patient states she used Nasocort nasal spray and nasal saline spray daily  Patient states she is also taking Allegra  Patient was instructed to use Afrin PRN for nose bleed at home  Patient was instructed on how to properly control nose bleeding at home  Patient will follow up with outpatient ear nose and throat specialist  I provided patient with strict RTER precautions  I advised patient follow-up with PCP in 24-48 hours  Patient verbalized understanding  Amount and/or Complexity of Data Reviewed  Review and summarize past medical records: yes    Risk of Complications, Morbidity, and/or Mortality  Presenting problems: low  Diagnostic procedures: low  Management options: low    Patient Progress  Patient progress: improved      Disposition  Final diagnoses:   Left-sided epistaxis     Time reflects when diagnosis was documented in both MDM as applicable and the Disposition within this note     Time User Action Codes Description Comment    10/9/2019  8:17 PM Richorse Hodgkins Add [R04 0] Left-sided epistaxis       ED Disposition     ED Disposition Condition Date/Time Comment    Discharge Stable Wed Oct 9, 2019  8:17 PM Raul Harris discharge to home/self care              Follow-up Information     Follow up With Specialties Details Why Contact Info Additional [address] Emergency Department Emergency Medicine Go to  If symptoms worsen [address]  [phone] AN ED, [facility] [address] [place] [address], Oscar Altamirano MD Internal Medicine Schedule an appointment as soon as possible for a visit   [address] [place] [address]       Hoa Croft MD Otolaryngology Schedule an appointment as soon as possible for a visit   [address] [facility] [address]  [phone]             Discharge Medication List as of 10/9/2019  8:17 PM      CONTINUE these medications which have NOT CHANGED Details   ALPRAZolam (XANAX) 0 25 mg tablet TAKE 2 TABLETS DAILY FOR ANXIETY, Historical Med      ascorbic acid (VITAMIN C) 500 mg tablet Vitamin C 500 mg tablet   TAKE 1 TABLET DAILY FOR 50 DAYS, Historical Med      ASMANEX 30 METERED DOSES 110 MCG/INH AEPB inhaler Inhale 1 puff daily, Starting Fri 6/28/2019, Historical Med      !! Brimonidine Tartrate (MIRVASO) 0 33 % GEL Mirvaso 0 33 % topical gel, Historical Med      !! Brimonidine Tartrate (MIRVASO) 0 33 % GEL Mirvaso 0 33 % topical gel, Historical Med      Calcium Ascorbate 500 MG TABS TAKE 1 TABLET DAILY FOR 50 DAYS, Historical Med      chlorhexidine (PERIDEX) 0 12 % solution RINSE 1/2 OUNCE TWICE A DAY FOR 30 SECONDS EXPECTORATE   NO NOT RINSE, Historical Med      Cholecalciferol (VITAMIN D-3) 5000 units TABS Starting Wed 6/29/2011, Historical Med      Cholecalciferol 15654 units CAPS Starting Wed 6/29/2011, Historical Med      EPINEPHrine (EPIPEN 2-PHOENIX) 0 3 mg/0 3 mL SOAJ EpiPen 2-Phoenix 0 3 mg/0 3 mL injection, auto-injector, Historical Med      estradiol (ESTRACE VAGINAL) 0 1 mg/g vaginal cream INSERT ONE GRAM DAILY VAGINALLY AT NIGHT FOR 14 DAYS, THEN TWICE WEEKLY FOR MAINTENANCE , Historical Med      fexofenadine (ALLEGRA) 180 MG tablet Take 180 mg by mouth daily  , Historical Med      fluticasone (FLONASE) 50 mcg/act nasal spray fluticasone propionate 50 mcg/actuation nasal spray,suspension, Historical Med      labetalol (NORMODYNE) 100 mg tablet Starting Sat 6/15/2019, Historical Med      !! levothyroxine (SYNTHROID) 137 mcg tablet Synthroid 137 mcg tablet, Historical Med      !! levothyroxine 125 mcg tablet Take 125 mcg by mouth daily  125 mcg & 137 mcg alternate days  , Historical Med      losartan (COZAAR) 50 mg tablet Starting Mon 4/1/2019, Historical Med      mometasone (NASONEX) 50 mcg/act nasal spray mometasone 50 mcg/actuation nasal spray, Historical Med      olopatadine (PATANOL) 0 1 % ophthalmic solution olopatadine 0 1 % eye drops, Historical Med      ORACEA 40 MG capsule Take 40 mg by mouth daily, Starting Wed 8/22/2018, Historical Med      Salicylic Acid 6 % CREA salicylic acid 6 % topical cream, Historical Med      urea (CARMOL) 40 % urea 40 % topical cream, Historical Med       !! - Potential duplicate medications found  Please discuss with provider  No discharge procedures on file      ED Provider  Electronically Signed by           Lorne Bowers PA-C  10/09/19 5683

## 2019-10-10 NOTE — DISCHARGE INSTRUCTIONS
Nosebleed   WHAT YOU NEED TO KNOW:   A nosebleed, or epistaxis, occurs when one or more of the blood vessels in your nose break  You may have dark or bright red blood from one or both nostrils  A nosebleed is most commonly caused by dry air or picking your nose  A direct blow to your nose, irritation from a cold or allergies, or a foreign object can also cause a nosebleed  DISCHARGE INSTRUCTIONS:   Return to the emergency department if:   · Your nasal packing is soaked with blood  · Your nose is still bleeding after 20 minutes, even after you pinch it  · You have a foul-smelling discharge coming out of your nose  · You feel so weak and dizzy that you have trouble standing up  · You have trouble breathing or talking  Contact your healthcare provider if:   · You have a fever and are vomiting  · You have pain in and around your nose that is getting worse even after you take pain medicines  · Your nasal pack is loose  · You have questions or concerns about your condition or care  First aid:   · Sit up and lean forward  This will help prevent you from swallowing blood  Spit blood and saliva into a bowl  · Apply pressure to your nose  Use 2 fingers to pinch your nose shut for 10 to 15 minutes  This will help stop the bleeding  Breathe through your mouth  · Apply ice  on the bridge of your nose to decrease swelling and bleeding  Use a cold pack or put crushed ice in a plastic bag  Cover it with a towel to protect your skin  · Pack your nose  with a cotton ball, tissue, tampon, or gauze bandage to stop the bleeding  Medicines:   · Medicines  applied to a small piece of cotton and placed in your nose  Medicine may also be sprayed in or applied directly to your nose  You may need medicine to prevent an infection  If bleeding is severe, medicine may be injected into a blood vessel in your nose  · Take your medicine as directed    Contact your healthcare provider if you think your medicine is not helping or if you have side effects  Tell him of her if you are allergic to any medicine  Keep a list of the medicines, vitamins, and herbs you take  Include the amounts, and when and why you take them  Bring the list or the pill bottles to follow-up visits  Carry your medicine list with you in case of an emergency  Prevent another nosebleed:   · Keep your nose moist   Put a small amount of petroleum jelly inside your nostrils as needed  Use a saline (saltwater) nasal spray  Do not put anything else inside your nose unless your healthcare provider says it is okay  Do not  use oil-based lubricants if you use oxygen therapy  They may be flammable  · Use a cool mist humidifier to increase air moisture in your home  This will help your nose stay moist      · Do not pick or blow your nose for at least a week  You can irritate or damage your nose if you pick it  Blowing your nose too hard may cause the bleeding to start again  Do not bend over or strain as this can cause the bleeding to start again  · Avoid irritants  such as tobacco smoke or chemical sprays such as   Follow up with your healthcare provider as directed: Any packing in your nose should be removed within 2 to 3 days  Write down your questions so you remember to ask them during your visits  © 2017 [address] [employer] Information is for End User's use only and may not be sold, redistributed or otherwise used for commercial purposes  All illustrations and images included in CareNotes® are the copyrighted property of A [employer] M [employer]  or [employer]  The above information is an  only  It is not intended as medical advice for individual conditions or treatments  Talk to your doctor, nurse or pharmacist before following any medical regimen to see if it is safe and effective for you

## 2019-11-05 ENCOUNTER — OFFICE VISIT (OUTPATIENT)
Dept: OBGYN CLINIC | Facility: HOSPITAL | Age: 59
End: 2019-11-05
Payer: COMMERCIAL

## 2019-11-05 VITALS
WEIGHT: 184 LBS | BODY MASS INDEX: 32.6 KG/M2 | DIASTOLIC BLOOD PRESSURE: 72 MMHG | HEIGHT: 63 IN | HEART RATE: 80 BPM | SYSTOLIC BLOOD PRESSURE: 118 MMHG

## 2019-11-05 DIAGNOSIS — G89.29 CHRONIC PAIN OF LEFT KNEE: ICD-10-CM

## 2019-11-05 DIAGNOSIS — M17.12 PRIMARY OSTEOARTHRITIS OF LEFT KNEE: Primary | ICD-10-CM

## 2019-11-05 DIAGNOSIS — M25.562 CHRONIC PAIN OF LEFT KNEE: ICD-10-CM

## 2019-11-05 PROCEDURE — 20610 DRAIN/INJ JOINT/BURSA W/O US: CPT | Performed by: ORTHOPAEDIC SURGERY

## 2019-11-05 RX ORDER — LIDOCAINE HYDROCHLORIDE 10 MG/ML
4 INJECTION, SOLUTION INFILTRATION; PERINEURAL
Status: COMPLETED | OUTPATIENT
Start: 2019-11-05 | End: 2019-11-05

## 2019-11-05 RX ORDER — HYALURONATE SODIUM 10 MG/ML
20 SYRINGE (ML) INTRAARTICULAR
Status: COMPLETED | OUTPATIENT
Start: 2019-11-05 | End: 2019-11-05

## 2019-11-05 RX ADMIN — LIDOCAINE HYDROCHLORIDE 4 ML: 10 INJECTION, SOLUTION INFILTRATION; PERINEURAL at 17:18

## 2019-11-05 RX ADMIN — Medication 20 MG: at 17:18

## 2019-11-05 NOTE — PROGRESS NOTES
Assessment:   Diagnosis ICD-10-CM Associated Orders   1  Primary osteoarthritis of left knee M17 12 Large joint arthrocentesis: L knee   2  Chronic pain of left knee M25 562 Large joint arthrocentesis: L knee    G89 29        Plan:    Left knee known osteoarthritis  Patient presents for initiation of the Euflexxa 3 series to her left knee  Patient tolerated the 1st of 3 injections today well  Ice and post injection protocol advised  Weightbearing activities as tolerated  Patient will follow up each of the next 2 weeks to complete the Visco series  To do next visit:  Return in about 1 week (around 11/12/2019) for re-check for Euflexxa #2 left knee  The above stated was discussed in layman's terms and the patient expressed understanding  All questions were answered to the patient's satisfaction  Scribe Attestation    I,:   Ekaterina King am acting as a scribe while in the presence of the attending physician :        I,:   Kacie Rodriguez MD personally performed the services described in this documentation    as scribed in my presence :              Subjective:   Vivienne Marquez is a 60 y o  female who presents   Repeat evaluation of her left knee known osteoarthritis and administration initiation of the Euflexxa gel series  Today is her 1st of 3 injections  Patient has had previous injections of cortisone with minimal lasting relief  She does find that her knee is not as sore as it was previously however does have stiffness with prolonged sedentary positions as well as stairs        Review of systems negative unless otherwise specified in HPI    Past Medical History:   Diagnosis Date    Asthma     Disease of thyroid gland     Hashimoto's    Fibroid     Hypertension     Primary osteoarthritis of knee     Left - last assessed: Aug 23, 2017    Seasonal allergies     Varicella        Past Surgical History:   Procedure Laterality Date    APPENDECTOMY      DIAGNOSTIC LAPAROSCOPY      HAND SURGERY Left     THYROIDECTOMY  1998    partial       Family History   Problem Relation Age of Onset    Hypertension Mother     Hyperlipidemia Mother     Skin cancer Mother    [facility] Stroke Mother     Diabetes Father     Heart attack Father     Stroke Father     Skin cancer Father     Breast cancer Maternal Aunt         > 48    Colon cancer Maternal Grandmother         >50       Social History     Occupational History    Not on file   Tobacco Use    Smoking status: Never Smoker    Smokeless tobacco: Never Used   Substance and Sexual Activity    Alcohol use: Yes    Drug use: No    Sexual activity: Not Currently     Birth control/protection: Post-menopausal         Current Outpatient Medications:     Fexofenadine HCl (ALLEGRA PO), take 2 tablet (60MG)  by oral route 2 times every day, Disp: , Rfl:     Levothyroxine Sodium (LEVOTHROID PO), take 1 tablet (137MCG)  by oral route  every day, Disp: , Rfl:     ALPRAZolam (XANAX) 0 25 mg tablet, TAKE 2 TABLETS DAILY FOR ANXIETY, Disp: , Rfl: 2    ascorbic acid (VITAMIN C) 500 mg tablet, Vitamin C 500 mg tablet  TAKE 1 TABLET DAILY FOR 50 DAYS, Disp: , Rfl:     ASMANEX 30 METERED DOSES 110 MCG/INH AEPB inhaler, Inhale 1 puff daily, Disp: , Rfl: 1    Brimonidine Tartrate (MIRVASO) 0 33 % GEL, Mirvaso 0 33 % topical gel, Disp: , Rfl:     Brimonidine Tartrate (MIRVASO) 0 33 % GEL, Mirvaso 0 33 % topical gel, Disp: , Rfl:     Calcium Ascorbate 500 MG TABS, TAKE 1 TABLET DAILY FOR 50 DAYS, Disp: , Rfl:     chlorhexidine (PERIDEX) 0 12 % solution, RINSE 1/2 OUNCE TWICE A DAY FOR 30 SECONDS EXPECTORATE    NO NOT RINSE, Disp: , Rfl: 0    Cholecalciferol (VITAMIN D-3) 5000 units TABS, , Disp: , Rfl:     Cholecalciferol 81121 units CAPS, , Disp: , Rfl:     EPINEPHrine (EPIPEN 2-PHOENIX) 0 3 mg/0 3 mL SOAJ, EpiPen 2-Phoenix 0 3 mg/0 3 mL injection, auto-injector, Disp: , Rfl:     estradiol (ESTRACE VAGINAL) 0 1 mg/g vaginal cream, INSERT ONE GRAM DAILY VAGINALLY AT NIGHT FOR 14 DAYS, THEN TWICE WEEKLY FOR MAINTENANCE , Disp: , Rfl:     fexofenadine (ALLEGRA) 180 MG tablet, Take 180 mg by mouth daily  , Disp: , Rfl:     fluticasone (FLONASE) 50 mcg/act nasal spray, fluticasone propionate 50 mcg/actuation nasal spray,suspension, Disp: , Rfl:     labetalol (NORMODYNE) 100 mg tablet, , Disp: , Rfl:     levothyroxine (SYNTHROID) 137 mcg tablet, Synthroid 137 mcg tablet, Disp: , Rfl:     levothyroxine 125 mcg tablet, Take 125 mcg by mouth daily  125 mcg & 137 mcg alternate days  , Disp: , Rfl:     losartan (COZAAR) 50 mg tablet, , Disp: , Rfl:     mometasone (NASONEX) 50 mcg/act nasal spray, mometasone 50 mcg/actuation nasal spray, Disp: , Rfl:     olopatadine (PATANOL) 0 1 % ophthalmic solution, olopatadine 0 1 % eye drops, Disp: , Rfl:     ORACEA 40 MG capsule, Take 40 mg by mouth daily, Disp: , Rfl: 3    Salicylic Acid 6 % CREA, salicylic acid 6 % topical cream, Disp: , Rfl:     urea (CARMOL) 40 %, urea 40 % topical cream, Disp: , Rfl:     No Known Allergies         Vitals:    11/05/19 1712   BP: 118/72   Pulse: 80       Objective:                    Left Knee Exam     Muscle Strength   The patient has normal left knee strength  Tenderness   The patient is experiencing tenderness in the medial joint line  Range of Motion   The patient has normal left knee ROM  Left knee flexion: with crepitation       Other   Erythema: absent  Sensation: normal  Swelling: mild  Effusion: no effusion present    Comments:    Mild varus alignment            Diagnostics, reviewed and taken today if performed as documented:    None performed            Procedures, if performed today:    Large joint arthrocentesis: L knee  Date/Time: 11/5/2019 5:18 PM  Consent given by: patient  Site marked: site marked  Timeout: Immediately prior to procedure a time out was called to verify the correct patient, procedure, equipment, support staff and site/side marked as required   Supporting Documentation  Indications: pain and diagnostic evaluation   Procedure Details  Location: knee - L knee  Preparation: Patient was prepped and draped in the usual sterile fashion  Needle size: 22 G  Ultrasound guidance: no  Approach: anterolateral  Medications administered: 4 mL lidocaine 1 %; 20 mg Sodium Hyaluronate 20 MG/2ML    Patient tolerance: patient tolerated the procedure well with no immediate complications  Dressing:  Sterile dressing applied             Portions of the record may have been created with voice recognition software  Occasional wrong word or "sound a like" substitutions may have occurred due to the inherent limitations of voice recognition software  Read the chart carefully and recognize, using context, where substitutions have occurred

## 2019-11-12 ENCOUNTER — OFFICE VISIT (OUTPATIENT)
Dept: OBGYN CLINIC | Facility: HOSPITAL | Age: 59
End: 2019-11-12
Payer: COMMERCIAL

## 2019-11-12 VITALS
WEIGHT: 184 LBS | DIASTOLIC BLOOD PRESSURE: 88 MMHG | HEIGHT: 63 IN | SYSTOLIC BLOOD PRESSURE: 137 MMHG | BODY MASS INDEX: 32.6 KG/M2 | HEART RATE: 82 BPM

## 2019-11-12 DIAGNOSIS — M17.12 PRIMARY OSTEOARTHRITIS OF LEFT KNEE: Primary | ICD-10-CM

## 2019-11-12 PROCEDURE — 20610 DRAIN/INJ JOINT/BURSA W/O US: CPT | Performed by: ORTHOPAEDIC SURGERY

## 2019-11-12 RX ORDER — HYALURONATE SODIUM 10 MG/ML
20 SYRINGE (ML) INTRAARTICULAR
Status: COMPLETED | OUTPATIENT
Start: 2019-11-12 | End: 2019-11-12

## 2019-11-12 RX ADMIN — Medication 20 MG: at 17:39

## 2019-11-12 NOTE — PROGRESS NOTES
[address] [facility] y o female presents for injection 2 of 3 of viscosupplementation to the left knee    Review of Systems  Review of systems negative unless otherwise specified in HPI    Past Medical History  Past Medical History:   Diagnosis Date    Asthma     Disease of thyroid gland     Hashimoto's    Fibroid     Hypertension     Primary osteoarthritis of knee     Left - last assessed: Aug 23, 2017    Seasonal allergies     Varicella        Past Surgical History  Past Surgical History:   Procedure Laterality Date    APPENDECTOMY      DIAGNOSTIC LAPAROSCOPY      HAND SURGERY Left     THYROIDECTOMY  1998    partial       Current Medications  Current Outpatient Medications on File Prior to Visit   Medication Sig Dispense Refill    ALPRAZolam (XANAX) 0 25 mg tablet TAKE 2 TABLETS DAILY FOR ANXIETY  2    ascorbic acid (VITAMIN C) 500 mg tablet Vitamin C 500 mg tablet   TAKE 1 TABLET DAILY FOR 50 DAYS      ASMANEX 30 METERED DOSES 110 MCG/INH AEPB inhaler Inhale 1 puff daily  1    Brimonidine Tartrate (MIRVASO) 0 33 % GEL Mirvaso 0 33 % topical gel      Brimonidine Tartrate (MIRVASO) 0 33 % GEL Mirvaso 0 33 % topical gel      Calcium Ascorbate 500 MG TABS TAKE 1 TABLET DAILY FOR 50 DAYS      chlorhexidine (PERIDEX) 0 12 % solution RINSE 1/2 OUNCE TWICE A DAY FOR 30 SECONDS EXPECTORATE   NO NOT RINSE  0    Cholecalciferol (VITAMIN D-3) 5000 units TABS       Cholecalciferol 85097 units CAPS       EPINEPHrine (EPIPEN 2-PHOENIX) 0 3 mg/0 3 mL SOAJ EpiPen 2-Phoenix 0 3 mg/0 3 mL injection, auto-injector      estradiol (ESTRACE VAGINAL) 0 1 mg/g vaginal cream INSERT ONE GRAM DAILY VAGINALLY AT NIGHT FOR 14 DAYS, THEN TWICE WEEKLY FOR MAINTENANCE   fexofenadine (ALLEGRA) 180 MG tablet Take 180 mg by mouth daily        Fexofenadine HCl (ALLEGRA PO) take 2 tablet (60MG)  by oral route 2 times every day      fluticasone (FLONASE) 50 mcg/act nasal spray fluticasone propionate 50 mcg/actuation nasal spray,suspension      labetalol (NORMODYNE) 100 mg tablet       levothyroxine (SYNTHROID) 137 mcg tablet Synthroid 137 mcg tablet      levothyroxine 125 mcg tablet Take 125 mcg by mouth daily  125 mcg & 137 mcg alternate days   Levothyroxine Sodium (LEVOTHROID PO) take 1 tablet (137MCG)  by oral route  every day      losartan (COZAAR) 50 mg tablet       mometasone (NASONEX) 50 mcg/act nasal spray mometasone 50 mcg/actuation nasal spray      olopatadine (PATANOL) 0 1 % ophthalmic solution olopatadine 0 1 % eye drops      ORACEA 40 MG capsule Take 40 mg by mouth daily  3    Salicylic Acid 6 % CREA salicylic acid 6 % topical cream      urea (CARMOL) 40 % urea 40 % topical cream       No current facility-administered medications on file prior to visit  Recent Labs (HCT,HGB,PT,INR,ESR,CRP,GLU,HgA1C)  0   Lab Value Date/Time    HCT 40 1 06/18/2015 1347    HGB 13 5 06/18/2015 1347    WBC 3 80 (L) 06/18/2015 1347    GLUCOSE 86 06/18/2015 1347         Physical exam  · General: Awake, Alert, Oriented  · Eyes: Pupils equal, round and reactive to light  · Heart: regular rate and rhythm  · Lungs: No audible wheezing  · Abdomen: soft  Exam finds left knee that is neither effused erythematous    Imaging  None accompany her    Procedure  An injection of viscosupplementation provided for the left knee joint  It is documented below      Large joint arthrocentesis: L knee  Date/Time: 11/12/2019 5:39 PM  Consent given by: patient  Supporting Documentation  Indications: pain   Procedure Details  Location: knee - L knee  Needle size: 20 G  Ultrasound guidance: no  Approach: anteromedial  Medications administered: 20 mg Sodium Hyaluronate 20 MG/2ML    Patient tolerance: patient tolerated the procedure well with no immediate complications  Dressing:  Sterile dressing applied            Assessment/Plan:   60 y  o female who requires ongoing viscosupplementation left knee  It is advised, except, administers outlined above    Next office follow-up will be in 1 week time for completion of a 3 shot series of viscosupplementation

## 2019-11-14 RX ORDER — LORATADINE 10 MG/1
TABLET ORAL
Refills: 0 | COMMUNITY
Start: 2019-10-30

## 2019-11-14 RX ORDER — LIDOCAINE HYDROCHLORIDE 20 MG/ML
SOLUTION OROPHARYNGEAL
Refills: 0 | COMMUNITY
Start: 2019-10-03

## 2019-11-14 RX ORDER — AZELASTINE HCL 205.5 UG/1
SPRAY NASAL
Refills: 0 | COMMUNITY
Start: 2019-10-30

## 2019-11-14 RX ORDER — AMOXICILLIN AND CLAVULANATE POTASSIUM 875; 125 MG/1; MG/1
1 TABLET, FILM COATED ORAL EVERY 12 HOURS
Refills: 0 | COMMUNITY
Start: 2019-10-03 | End: 2020-09-28

## 2019-11-14 RX ORDER — AMOXICILLIN 500 MG/1
500 CAPSULE ORAL 3 TIMES DAILY
Refills: 0 | COMMUNITY
Start: 2019-10-30 | End: 2020-09-28

## 2019-11-19 ENCOUNTER — OFFICE VISIT (OUTPATIENT)
Dept: OBGYN CLINIC | Facility: HOSPITAL | Age: 59
End: 2019-11-19
Payer: COMMERCIAL

## 2019-11-19 VITALS
HEIGHT: 63 IN | HEART RATE: 81 BPM | BODY MASS INDEX: 32.59 KG/M2 | DIASTOLIC BLOOD PRESSURE: 89 MMHG | SYSTOLIC BLOOD PRESSURE: 160 MMHG

## 2019-11-19 DIAGNOSIS — M17.12 PRIMARY OSTEOARTHRITIS OF LEFT KNEE: Primary | ICD-10-CM

## 2019-11-19 PROCEDURE — 20610 DRAIN/INJ JOINT/BURSA W/O US: CPT | Performed by: ORTHOPAEDIC SURGERY

## 2019-11-19 RX ORDER — HYALURONATE SODIUM 10 MG/ML
20 SYRINGE (ML) INTRAARTICULAR
Status: COMPLETED | OUTPATIENT
Start: 2019-11-19 | End: 2019-11-19

## 2019-11-19 RX ADMIN — Medication 20 MG: at 17:26

## 2019-11-19 NOTE — PROGRESS NOTES
59 y o female here for 3rd in series of 3 series of viscosupplement injection left knee  She did not have any adverse reaction to first 2 injections  Review of Systems  Review of systems negative unless otherwise specified in HPI    Past Medical History  Past Medical History:   Diagnosis Date    Asthma     Disease of thyroid gland     Hashimoto's    Fibroid     Hypertension     Primary osteoarthritis of knee     Left - last assessed: Aug 23, 2017    Seasonal allergies     Varicella        Past Surgical History  Past Surgical History:   Procedure Laterality Date    APPENDECTOMY      DIAGNOSTIC LAPAROSCOPY      HAND SURGERY Left     THYROIDECTOMY  1998    partial       Current Medications  Current Outpatient Medications on File Prior to Visit   Medication Sig Dispense Refill    ALPRAZolam (XANAX) 0 25 mg tablet TAKE 2 TABLETS DAILY FOR ANXIETY  2    amoxicillin (AMOXIL) 500 mg capsule Take 500 mg by mouth 3 (three) times a day  0    amoxicillin-clavulanate (AUGMENTIN) 875-125 mg per tablet Take 1 tablet by mouth every 12 (twelve) hours  0    ascorbic acid (VITAMIN C) 500 mg tablet Vitamin C 500 mg tablet   TAKE 1 TABLET DAILY FOR 50 DAYS      ASMANEX 30 METERED DOSES 110 MCG/INH AEPB inhaler Inhale 1 puff daily  1    Azelastine HCl 0 15 % SOLN INSERT 2 SPRAYS 2 TIMES DAILY  0    Brimonidine Tartrate (MIRVASO) 0 33 % GEL Mirvaso 0 33 % topical gel      Calcium Ascorbate 500 MG TABS TAKE 1 TABLET DAILY FOR 50 DAYS      chlorhexidine (PERIDEX) 0 12 % solution RINSE 1/2 OUNCE TWICE A DAY FOR 30 SECONDS EXPECTORATE   NO NOT RINSE  0    Cholecalciferol (VITAMIN D-3) 5000 units TABS       Cholecalciferol 18615 units CAPS       EPINEPHrine (EPIPEN 2-PHOENIX) 0 3 mg/0 3 mL SOAJ EpiPen 2-Phoenix 0 3 mg/0 3 mL injection, auto-injector      estradiol (ESTRACE VAGINAL) 0 1 mg/g vaginal cream INSERT ONE GRAM DAILY VAGINALLY AT NIGHT FOR 14 DAYS, THEN TWICE WEEKLY FOR MAINTENANCE        fexofenadine (ALLEGRA) 180 MG tablet Take 180 mg by mouth daily   Fexofenadine HCl (ALLEGRA PO) take 2 tablet (60MG)  by oral route 2 times every day      fluticasone (FLONASE) 50 mcg/act nasal spray fluticasone propionate 50 mcg/actuation nasal spray,suspension      labetalol (NORMODYNE) 100 mg tablet       levothyroxine (SYNTHROID) 137 mcg tablet Synthroid 137 mcg tablet      levothyroxine 125 mcg tablet Take 125 mcg by mouth daily  125 mcg & 137 mcg alternate days   Lidocaine Viscous HCl (XYLOCAINE) 2 % mucosal solution USE 5 ML BY MOUTH 4 TIMES DAILY (BEFORE MEALS AND AT BEDTIME) GARGLE FOR THROAT PAIN   0    loratadine (CLARITIN) 10 mg tablet TAKE 1T ABLET BY MOUTH DAILY  0    losartan (COZAAR) 50 mg tablet       mometasone (NASONEX) 50 mcg/act nasal spray mometasone 50 mcg/actuation nasal spray      olopatadine (PATANOL) 0 1 % ophthalmic solution olopatadine 0 1 % eye drops      ORACEA 40 MG capsule Take 40 mg by mouth daily  3    Salicylic Acid 6 % CREA salicylic acid 6 % topical cream      urea (CARMOL) 40 % urea 40 % topical cream      [DISCONTINUED] Brimonidine Tartrate (MIRVASO) 0 33 % GEL Mirvaso 0 33 % topical gel      [DISCONTINUED] Levothyroxine Sodium (LEVOTHROID PO) take 1 tablet (137MCG)  by oral route  every day       No current facility-administered medications on file prior to visit  Recent Labs [facility] [address])  0   Lab Value Date/Time    HCT 40 1 06/18/2015 1347    HGB 13 5 06/18/2015 1347    WBC 3 80 (L) 06/18/2015 1347    GLUCOSE 86 06/18/2015 1347         Physical exam  · General: Awake, Alert, Oriented  · Eyes: Pupils equal, round and reactive to light  · Heart: regular rate and rhythm  · Lungs: No audible wheezing  · Abdomen: soft  Left knee  No effusion  Knee ROM is full  Stable to varus/valgus stress  Sensation motor grossly intact distally  Leg warm and well perfused      Imaging  No new imaging    Procedure  Large joint arthrocentesis: L knee  Date/Time: 11/19/2019 5:26 PM  Consent given by: patient  Site marked: site marked  Timeout: Immediately prior to procedure a time out was called to verify the correct patient, procedure, equipment, support staff and site/side marked as required   Supporting Documentation  Indications: pain   Procedure Details  Location: knee - L knee  Preparation: Patient was prepped and draped in the usual sterile fashion  Needle size: 22 G  Ultrasound guidance: no  Approach: anterolateral  Medications administered: 20 mg Sodium Hyaluronate 20 MG/2ML    Patient tolerance: patient tolerated the procedure well with no immediate complications  Dressing:  Sterile dressing applied            Assessment/Plan:   60 y  o female left knee DJD  Received 3rd in series of 3 Euflexxa injection  Administered as outlined above   Follow up in 3 months for repeat evaluation

## 2019-11-29 ENCOUNTER — HOSPITAL ENCOUNTER (OUTPATIENT)
Dept: RADIOLOGY | Age: 59
Discharge: HOME | End: 2019-11-29
Payer: COMMERCIAL

## 2019-11-29 ENCOUNTER — TRANSCRIBE ORDERS (OUTPATIENT)
Dept: ADMINISTRATIVE | Facility: HOSPITAL | Age: 59
End: 2019-11-29

## 2019-11-29 VITALS — WEIGHT: 180 LBS | HEIGHT: 63 IN | BODY MASS INDEX: 31.89 KG/M2

## 2019-11-29 DIAGNOSIS — Z12.31 ENCOUNTER FOR SCREENING MAMMOGRAM FOR MALIGNANT NEOPLASM OF BREAST: Primary | ICD-10-CM

## 2019-11-29 DIAGNOSIS — Z12.31 ENCOUNTER FOR SCREENING MAMMOGRAM FOR MALIGNANT NEOPLASM OF BREAST: ICD-10-CM

## 2019-11-29 PROCEDURE — 77067 SCR MAMMO BI INCL CAD: CPT

## 2019-12-07 LAB
CREAT ?TM UR-SCNC: 98 UMOL/L
EXT MICROALBUMIN URINE RANDOM: 0.7
HBA1C MFR BLD HPLC: 5.5 %
MICROALBUMIN/CREAT UR: 7 MG/G{CREAT}

## 2019-12-26 PROCEDURE — 87077 CULTURE AEROBIC IDENTIFY: CPT | Performed by: PHYSICIAN ASSISTANT

## 2019-12-26 PROCEDURE — 87205 SMEAR GRAM STAIN: CPT | Performed by: PHYSICIAN ASSISTANT

## 2019-12-26 PROCEDURE — 87070 CULTURE OTHR SPECIMN AEROBIC: CPT | Performed by: PHYSICIAN ASSISTANT

## 2019-12-26 PROCEDURE — 87186 SC STD MICRODIL/AGAR DIL: CPT | Performed by: PHYSICIAN ASSISTANT

## 2019-12-27 ENCOUNTER — LAB REQUISITION (OUTPATIENT)
Dept: LAB | Facility: HOSPITAL | Age: 59
End: 2019-12-27
Payer: COMMERCIAL

## 2019-12-27 DIAGNOSIS — B95.8 UNSPECIFIED STAPHYLOCOCCUS AS THE CAUSE OF DISEASES CLASSIFIED ELSEWHERE: ICD-10-CM

## 2019-12-30 LAB
BACTERIA WND AEROBE CULT: ABNORMAL
GRAM STN SPEC: ABNORMAL
GRAM STN SPEC: ABNORMAL

## 2020-03-03 ENCOUNTER — OFFICE VISIT (OUTPATIENT)
Dept: OBGYN CLINIC | Facility: HOSPITAL | Age: 60
End: 2020-03-03
Payer: COMMERCIAL

## 2020-03-03 VITALS
HEART RATE: 80 BPM | DIASTOLIC BLOOD PRESSURE: 85 MMHG | WEIGHT: 176 LBS | BODY MASS INDEX: 31.18 KG/M2 | SYSTOLIC BLOOD PRESSURE: 132 MMHG | HEIGHT: 63 IN

## 2020-03-03 DIAGNOSIS — M17.12 PRIMARY OSTEOARTHRITIS OF LEFT KNEE: Primary | ICD-10-CM

## 2020-03-03 DIAGNOSIS — G89.29 CHRONIC PAIN OF LEFT KNEE: ICD-10-CM

## 2020-03-03 DIAGNOSIS — M25.562 CHRONIC PAIN OF LEFT KNEE: ICD-10-CM

## 2020-03-03 PROCEDURE — 99212 OFFICE O/P EST SF 10 MIN: CPT | Performed by: ORTHOPAEDIC SURGERY

## 2020-03-03 RX ORDER — PANTOPRAZOLE SODIUM 40 MG/1
20 TABLET, DELAYED RELEASE ORAL DAILY
COMMUNITY
Start: 2020-01-14 | End: 2021-07-21 | Stop reason: ALTCHOICE

## 2020-03-03 RX ORDER — CEFADROXIL 500 MG/1
CAPSULE ORAL
COMMUNITY
Start: 2019-12-26 | End: 2020-09-28

## 2020-03-03 RX ORDER — AZITHROMYCIN 250 MG/1
TABLET, FILM COATED ORAL
COMMUNITY
Start: 2020-01-21 | End: 2020-09-28

## 2020-03-03 NOTE — PROGRESS NOTES
Assessment:  1  Primary osteoarthritis of left knee  Injection procedure prior authorization   2  Chronic pain of left knee  Injection procedure prior authorization       Plan:  The patient is doing well  Euflexxa is ordered  She should follow up in 3 months for visco x3  To do next visit:  Return in about 3 months (around 6/3/2020) for visco x3   The above stated was discussed in layman's terms and the patient expressed understanding  All questions were answered to the patient's satisfaction  Scribe Attestation    I,:   Rojas Franco am acting as a scribe while in the presence of the attending physician :        I,:   Arias Mena MD personally performed the services described in this documentation    as scribed in my presence :              Subjective:   Juliane Somers is a 60 y o  female who presents for follow up of left knee  She is s/p left knee Euflexxa injection, 11/19/19  Today she complains of occasional left medial knee pain  Excess activity aggravates  Rest alleviates  She denies medications for her knees          Review of systems negative unless otherwise specified in HPI    Past Medical History:   Diagnosis Date    Asthma     Disease of thyroid gland     Hashimoto's    Fibroid     Hypertension     Primary osteoarthritis of knee     Left - last assessed: Aug 23, 2017    Seasonal allergies     Varicella        Past Surgical History:   Procedure Laterality Date    APPENDECTOMY      DIAGNOSTIC LAPAROSCOPY      HAND SURGERY Left     THYROIDECTOMY  1998    partial       Family History   Problem Relation Age of Onset   Joel Mariposa Hypertension Mother     Hyperlipidemia Mother     Skin cancer Mother    Joel Mariposa Stroke Mother     Diabetes Father     Heart attack Father     Stroke Father     Skin cancer Father     Breast cancer Maternal Aunt         > 48    Colon cancer Maternal Grandmother         >50    No Known Problems Daughter     No Known Problems Maternal Aunt     No Known Problems Maternal Aunt        Social History     Occupational History    Not on file   Tobacco Use    Smoking status: Never Smoker    Smokeless tobacco: Never Used   Substance and Sexual Activity    Alcohol use: Yes    Drug use: No    Sexual activity: Not Currently     Birth control/protection: Post-menopausal         Current Outpatient Medications:     ALPRAZolam (XANAX) 0 25 mg tablet, TAKE 2 TABLETS DAILY FOR ANXIETY, Disp: , Rfl: 2    amoxicillin (AMOXIL) 500 mg capsule, Take 500 mg by mouth 3 (three) times a day, Disp: , Rfl: 0    amoxicillin-clavulanate (AUGMENTIN) 875-125 mg per tablet, Take 1 tablet by mouth every 12 (twelve) hours, Disp: , Rfl: 0    ascorbic acid (VITAMIN C) 500 mg tablet, Vitamin C 500 mg tablet  TAKE 1 TABLET DAILY FOR 50 DAYS, Disp: , Rfl:     ASMANEX 30 METERED DOSES 110 MCG/INH AEPB inhaler, Inhale 1 puff daily, Disp: , Rfl: 1    Azelastine HCl 0 15 % SOLN, INSERT 2 SPRAYS 2 TIMES DAILY  , Disp: , Rfl: 0    azithromycin (ZITHROMAX) 250 mg tablet, , Disp: , Rfl:     Brimonidine Tartrate (MIRVASO) 0 33 % GEL, Mirvaso 0 33 % topical gel, Disp: , Rfl:     Calcium Ascorbate 500 MG TABS, TAKE 1 TABLET DAILY FOR 50 DAYS, Disp: , Rfl:     cefadroxil (DURICEF) 500 mg capsule, , Disp: , Rfl:     chlorhexidine (PERIDEX) 0 12 % solution, RINSE 1/2 OUNCE TWICE A DAY FOR 30 SECONDS EXPECTORATE   NO NOT RINSE, Disp: , Rfl: 0    Cholecalciferol (VITAMIN D-3) 5000 units TABS, , Disp: , Rfl:     Cholecalciferol 50513 units CAPS, , Disp: , Rfl:     EPINEPHrine (EPIPEN 2-PHOENIX) 0 3 mg/0 3 mL SOAJ, EpiPen 2-Phoenix 0 3 mg/0 3 mL injection, auto-injector, Disp: , Rfl:     estradiol (ESTRACE VAGINAL) 0 1 mg/g vaginal cream, INSERT ONE GRAM DAILY VAGINALLY AT NIGHT FOR 14 DAYS, THEN TWICE WEEKLY FOR MAINTENANCE , Disp: , Rfl:     fexofenadine (ALLEGRA) 180 MG tablet, Take 180 mg by mouth daily  , Disp: , Rfl:     Fexofenadine HCl (ALLEGRA PO), take 2 tablet (60MG)  by oral route 2 times every day, Disp: , Rfl:     fluticasone (FLONASE) 50 mcg/act nasal spray, fluticasone propionate 50 mcg/actuation nasal spray,suspension, Disp: , Rfl:     labetalol (NORMODYNE) 100 mg tablet, , Disp: , Rfl:     levothyroxine (SYNTHROID) 137 mcg tablet, Synthroid 137 mcg tablet, Disp: , Rfl:     levothyroxine 125 mcg tablet, Take 125 mcg by mouth daily  125 mcg & 137 mcg alternate days  , Disp: , Rfl:     Lidocaine Viscous HCl (XYLOCAINE) 2 % mucosal solution, USE 5 ML BY MOUTH 4 TIMES DAILY (BEFORE MEALS AND AT BEDTIME) GARGLE FOR THROAT PAIN , Disp: , Rfl: 0    loratadine (CLARITIN) 10 mg tablet, TAKE 1T ABLET BY MOUTH DAILY  , Disp: , Rfl: 0    losartan (COZAAR) 50 mg tablet, , Disp: , Rfl:     mometasone (NASONEX) 50 mcg/act nasal spray, mometasone 50 mcg/actuation nasal spray, Disp: , Rfl:     olopatadine (PATANOL) 0 1 % ophthalmic solution, olopatadine 0 1 % eye drops, Disp: , Rfl:     ORACEA 40 MG capsule, Take 40 mg by mouth daily, Disp: , Rfl: 3    pantoprazole (PROTONIX) 40 mg tablet, , Disp: , Rfl:     Salicylic Acid 6 % CREA, salicylic acid 6 % topical cream, Disp: , Rfl:     urea (CARMOL) 40 %, urea 40 % topical cream, Disp: , Rfl:     No Known Allergies         Vitals:    03/03/20 1705   BP: 132/85   Pulse: 80       Objective:  Physical exam  · General: Awake, Alert, Oriented  · Eyes: Pupils equal, round and reactive to light  · Heart: regular rate and rhythm  · Lungs: No audible wheezing  · Abdomen: soft                    Ortho Exam   Left knee:  Varus alignment  No erythema or ecchymosis  Mild effusion   Mild swelling  Normal strength  Good ROM with crepitus   Calf compartments soft and supple  Sensation intact  Toes are warm sensate and mobile        Diagnostics, reviewed and taken today if performed as documented:    None performed      Procedures, if performed today:    Procedures    None performed      Portions of the record may have been created with voice recognition software    Occasional wrong word or "sound a like" substitutions may have occurred due to the inherent limitations of voice recognition software  Read the chart carefully and recognize, using context, where substitutions have occurred

## 2020-06-12 LAB
CREAT ?TM UR-SCNC: 54 UMOL/L
EXT MICROALBUMIN URINE RANDOM: 0.4
HBA1C MFR BLD HPLC: 5.6 %
MICROALBUMIN/CREAT UR: 7 MG/G{CREAT}

## 2020-06-22 ENCOUNTER — TELEPHONE (OUTPATIENT)
Dept: SURGICAL ONCOLOGY | Facility: CLINIC | Age: 60
End: 2020-06-22

## 2020-06-26 ENCOUNTER — TELEPHONE (OUTPATIENT)
Dept: HEMATOLOGY ONCOLOGY | Facility: CLINIC | Age: 60
End: 2020-06-26

## 2020-07-02 ENCOUNTER — CONSULT (OUTPATIENT)
Dept: HEMATOLOGY ONCOLOGY | Facility: CLINIC | Age: 60
End: 2020-07-02
Payer: COMMERCIAL

## 2020-07-02 VITALS
HEART RATE: 82 BPM | RESPIRATION RATE: 16 BRPM | TEMPERATURE: 97.9 F | BODY MASS INDEX: 30.3 KG/M2 | DIASTOLIC BLOOD PRESSURE: 88 MMHG | SYSTOLIC BLOOD PRESSURE: 126 MMHG | WEIGHT: 171 LBS | HEIGHT: 63 IN

## 2020-07-02 DIAGNOSIS — D70.9 NEUTROPENIA, UNSPECIFIED TYPE (HCC): Primary | ICD-10-CM

## 2020-07-02 PROCEDURE — 99244 OFF/OP CNSLTJ NEW/EST MOD 40: CPT | Performed by: PHYSICIAN ASSISTANT

## 2020-07-02 NOTE — LETTER
July 2, 2020     Adalberto Moore MD  [address] [address]    Patient: Serena Rodriguez   YOB: 1960   Date of Visit: 7/2/2020       Dear Dr Cristina Small: Thank you for referring Paty Rose to me for evaluation  Below are my notes for this consultation  If you have questions, please do not hesitate to call me  I look forward to following your patient along with you  Sincerely,        Jomar Shipley PA-C        CC: MD Jomar Wise PA-C  7/2/2020 10:06 AM  Cosign Needed  [id number] [facility]  [facility] [address] [address]  Hematology Ambulatory Consult  Serena Rodriguez, 1960, [id number]  7/2/2020    Assessment/Plan:  1  Neutropenia, unspecified type [facility])  This is a 76-year-old female with history of neutropenia who was previously evaluated for cyclic neutropenia over 10 years ago  Patient's blood counts now demonstrated more steadily consistent neutropenia and patient was referred back to the office for re-evaluation  Patient is asymptomatic without recurring infections, progressive and profound fatigue, drenching night sweats, and fevers  Patient has a total of two sinus infections a year and 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  I discussed with the patient repeating a workup which would include flow cytometry, substrate deficiencies, chronic viral infections and inflammatory markers  Patient does have Hashimoto's thyroiditis and is possible that neutropenia is related to underlying autoimmune disorder however, it is also disease is likely that the patient has a congenital neutropenia  I also briefly discussed bone marrow biopsy  Patient understands it is a possibility in the future  She agrees that blood work is sufficent as of right now as she is asymptomatic       Again reviewed signs and symptoms of progressive neutropenia  If patient experiences these obviously, additional workup would be needed again at that time  Given that the patient is asymptomatic we will complete the blood work and I will follow up with her in 2 weeks  We will remain available to the patient and her other providers as needed  - CBC and differential; Future  - Chronic Hepatitis Panel; Future  - Comprehensive metabolic panel; Future  - Copper Level; Future  - C-reactive protein; Future  - Folate; Future  - Leukemia/Lymphoma flow cytometry; Future  - Methylmalonic acid, serum; Future  - Rapid HIV 1/2 AB-AG Combo; Future  - Sedimentation rate, automated; Future  - CBC and differential  - Comprehensive metabolic panel  - Copper Level  - C-reactive protein  - Folate  - Leukemia/Lymphoma flow cytometry  - Methylmalonic acid, serum  - Sedimentation rate, automated      The patient is scheduled for follow-up in approximately 3 weeks  Patient voiced agreement and understanding to the above  Patient knows to call the Hematology/Oncology office with any questions and concerns regarding the above  I have spent 45minutes with Patient  today in which greater than 50% of this time was spent in counseling/coordination of care regarding Diagnostic results, Prognosis, Intructions for management and Impressions  Goals and Barriers:    Current Goal:   Prolong Survival from Cancer  Barriers: None  Patient's Capacity to Self Care:  Patient able to self care   -------------------------------------------------------------------------------------------------------    Chief Complaint   Patient presents with    Consult       Referring provider:  Jennine Hatchet, MD  [address], [address] [facility]    History of present illness:    This is a 71-year-old female with past medical history chronic asthma,   Chronic allergies taking Allegra once a day secondary to generalized pruritus/hisatime reaction, hypertension, Hashimoto's thyroiditis on Synthroid status post partial thyroidectomy, hiatal hernia and GERD controlled by dietary adjustments who was referred to [facility] hematology from her endocrinologist for evaluation of mild thrombocytopenia (Dr Melisa Mejia)  Patient notes that she has had neutropenia and leukopenia for years  Patient states that she believes she was evaluated approximately 10 years ago secondary for similar issue  At that time, her blood counts were cyclical and her neutropenia would resolve spontaneously  Her white blood cell count has never been very robust in normally ranges around 4000 per unit L neutropenia is also regularly below 2000 per unit L      patient was referred to come back as patient's counts had stopped cycling and were consistently in the abnormal low range  Patient denies having bone marrow biopsy completed previously  Patient does admit to need for B12 injections as a child, unsure of situation surrounding this  Patient denies profound fatigue however does admit to some mild fatigue compared to the time last year secondary to COVID-19, chronic anxiety and depression related to the pandemic  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  Patient notes that she has hot flashes but denies drenching night sweats  Patient has an average of 2 infections a year  She states that compared to 10 years ago she would have may be 1 but denies issues with recurring fevers of unknown origin and states that sinus infections completely resolved with the administration of antibiotics    Interestingly,  She denies fever with presentation of sinus infection ( likely viral )     laboratory results:   10/29/12 WBC = 4 0, hemoglobin = 14 6, platelet count = 002, ANC = 1976   6/19/19 WBC = 3 3, hemoglobin = 13 4, platelet count = 043, ANC = 1490   12/7/19 WBC = 3 8, hemoglobin = 13 4, platelet count = 784, ANC = 1486   6/12/20 WBC = 3 4, hemoglobin = 13 3, platelet count = 246, ANC = 1289    Last colonoscopy/Endoscopy: 8/2019  Mammo and Pap : UTD within th elast year with Transvaginal u/s    Review of Systems   Constitutional: Positive for fatigue (mild, not progressive)  Negative for appetite change, fever and unexpected weight change  HENT: Positive for voice change (relates to chronic GERD, last endo 8/19)  Negative for nosebleeds  Respiratory: Negative for cough, choking and shortness of breath  Negative hemoptysis  Cardiovascular: Negative for chest pain, palpitations and leg swelling  Gastrointestinal: Negative  Negative for abdominal distention, abdominal pain, anal bleeding, blood in stool, constipation, diarrhea, nausea and vomiting  Endocrine: Negative  Negative for cold intolerance  Genitourinary: Negative  Negative for hematuria, menstrual problem, vaginal bleeding, vaginal discharge and vaginal pain  Patient has uterine fibroids for which she is observed with ultrasounds every 6 months to 1 year intervals  Musculoskeletal: Positive for back pain (herniated disc)  Negative for arthralgias, myalgias, neck pain and neck stiffness  Skin: Negative  Negative for color change, pallor and rash  Allergic/Immunologic: Negative  Negative for immunocompromised state  Neurological: Negative  Negative for weakness and headaches  Hematological: Negative for adenopathy  Does not bruise/bleed easily  All other systems reviewed and are negative        Patient Active Problem List   Diagnosis    Asthma    Leiomyoma of uterus    Chronic pain of left knee    Primary osteoarthritis of left knee    Prolapse of anterior vaginal wall    Stress incontinence in female       Past Medical History:   Diagnosis Date    Asthma     Disease of thyroid gland     Hashimoto's    Fibroid     Hypertension     Primary osteoarthritis of knee     Left - last assessed: Aug 23, 2017    Seasonal allergies     Varicella        Past Surgical History: Procedure Laterality Date    APPENDECTOMY      DIAGNOSTIC LAPAROSCOPY      HAND SURGERY Left     THYROIDECTOMY  1998    partial       Family History   Problem Relation Age of Onset    Hypertension Mother     Hyperlipidemia Mother     Skin cancer Mother     Stroke Mother     Diabetes Father     Heart attack Father     Stroke Father     Skin cancer Father     Breast cancer Maternal Aunt         > 48    Colon cancer Maternal Grandmother         >50    No Known Problems Daughter     No Known Problems Maternal Aunt     No Known Problems Maternal Aunt        Social History     Socioeconomic History    Marital status:      Spouse name: Not on file    Number of children: Not on file    Years of education: Not on file    Highest education level: Not on file   Occupational History    Not on file   Social Needs    Financial resource strain: Not on file    Food insecurity:     Worry: Not on file     Inability: Not on file    Transportation needs:     Medical: Not on file     Non-medical: Not on file   Tobacco Use    Smoking status: Never Smoker    Smokeless tobacco: Never Used   Substance and Sexual Activity    Alcohol use:  Yes    Drug use: No    Sexual activity: Not Currently     Birth control/protection: Post-menopausal   Lifestyle    Physical activity:     Days per week: Not on file     Minutes per session: Not on file    Stress: Not on file   Relationships    Social connections:     Talks on phone: Not on file     Gets together: Not on file     Attends [employer] service: Not on file     Active member of club or organization: Not on file     Attends meetings of clubs or organizations: Not on file     Relationship status: Not on file    Intimate partner violence:     Fear of current or ex partner: Not on file     Emotionally abused: Not on file     Physically abused: Not on file     Forced sexual activity: Not on file   Other Topics Concern    Not on file   Social History Narrative  Not on file         Current Outpatient Medications:     ALPRAZolam (XANAX) 0 25 mg tablet, TAKE 2 TABLETS DAILY FOR ANXIETY, Disp: , Rfl: 2    ascorbic acid (VITAMIN C) 500 mg tablet, Vitamin C 500 mg tablet  TAKE 1 TABLET DAILY FOR 50 DAYS, Disp: , Rfl:     ASMANEX 30 METERED DOSES 110 MCG/INH AEPB inhaler, Inhale 1 puff daily, Disp: , Rfl: 1    Azelastine HCl 0 15 % SOLN, INSERT 2 SPRAYS 2 TIMES DAILY  , Disp: , Rfl: 0    azithromycin (ZITHROMAX) 250 mg tablet, , Disp: , Rfl:     Brimonidine Tartrate (MIRVASO) 0 33 % GEL, Mirvaso 0 33 % topical gel, Disp: , Rfl:     Calcium Ascorbate 500 MG TABS, TAKE 1 TABLET DAILY FOR 50 DAYS, Disp: , Rfl:     cefadroxil (DURICEF) 500 mg capsule, , Disp: , Rfl:     chlorhexidine (PERIDEX) 0 12 % solution, RINSE 1/2 OUNCE TWICE A DAY FOR 30 SECONDS EXPECTORATE   NO NOT RINSE, Disp: , Rfl: 0    Cholecalciferol (VITAMIN D-3) 5000 units TABS, , Disp: , Rfl:     Cholecalciferol 13314 units CAPS, , Disp: , Rfl:     diclofenac sodium (Voltaren) 1 %, Voltaren 1 % topical gel  APPLY 2 GRAM TO THE AFFECTED AREA(S) BY TOPICAL ROUTE 4 TIMES PER DAY, Disp: , Rfl:     EPINEPHrine (EPIPEN 2-PHOENIX) 0 3 mg/0 3 mL SOAJ, EpiPen 2-Phoenix 0 3 mg/0 3 mL injection, auto-injector, Disp: , Rfl:     estradiol (ESTRACE VAGINAL) 0 1 mg/g vaginal cream, INSERT ONE GRAM DAILY VAGINALLY AT NIGHT FOR 14 DAYS, THEN TWICE WEEKLY FOR MAINTENANCE , Disp: , Rfl:     fexofenadine (ALLEGRA) 180 MG tablet, Take 180 mg by mouth daily  , Disp: , Rfl:     Fexofenadine HCl (ALLEGRA PO), take 2 tablet (60MG)  by oral route 2 times every day, Disp: , Rfl:     fluticasone (FLONASE) 50 mcg/act nasal spray, fluticasone propionate 50 mcg/actuation nasal spray,suspension, Disp: , Rfl:     labetalol (NORMODYNE) 100 mg tablet, , Disp: , Rfl:     levothyroxine (SYNTHROID) 137 mcg tablet, Synthroid 137 mcg tablet, Disp: , Rfl:     levothyroxine 125 mcg tablet, Take 125 mcg by mouth daily   125 mcg & 137 mcg alternate days , Disp: , Rfl:     Lidocaine Viscous HCl (XYLOCAINE) 2 % mucosal solution, USE 5 ML BY MOUTH 4 TIMES DAILY (BEFORE MEALS AND AT BEDTIME) GARGLE FOR THROAT PAIN , Disp: , Rfl: 0    loratadine (CLARITIN) 10 mg tablet, TAKE 1T ABLET BY MOUTH DAILY  , Disp: , Rfl: 0    losartan (COZAAR) 50 mg tablet, , Disp: , Rfl:     mometasone (NASONEX) 50 mcg/act nasal spray, mometasone 50 mcg/actuation nasal spray, Disp: , Rfl:     olopatadine (PATANOL) 0 1 % ophthalmic solution, olopatadine 0 1 % eye drops, Disp: , Rfl:     ORACEA 40 MG capsule, Take 40 mg by mouth daily, Disp: , Rfl: 3    pantoprazole (PROTONIX) 40 mg tablet, , Disp: , Rfl:     Salicylic Acid 6 % CREA, salicylic acid 6 % topical cream, Disp: , Rfl:     urea (CARMOL) 40 %, urea 40 % topical cream, Disp: , Rfl:     amoxicillin (AMOXIL) 500 mg capsule, Take 500 mg by mouth 3 (three) times a day, Disp: , Rfl: 0    amoxicillin-clavulanate (AUGMENTIN) 875-125 mg per tablet, Take 1 tablet by mouth every 12 (twelve) hours, Disp: , Rfl: 0    No Known Allergies    Objective:  /88 (BP Location: Left arm)   Pulse 82   Temp 97 9 °F (36 6 °C) (Tympanic)   Resp 16   Ht 5' 3" (1 6 m)   Wt 77 6 kg (171 lb)   LMP  (LMP Unknown)   BMI 30 29 kg/m²    Physical Exam   Constitutional: She is oriented to person, place, and time  She appears well-developed and well-nourished  No distress  HENT:   Head: Normocephalic and atraumatic  Mouth/Throat: Oropharynx is clear and moist  No oropharyngeal exudate  Eyes: Pupils are equal, round, and reactive to light  EOM are normal  No scleral icterus  Neck: Normal range of motion  Cardiovascular: Normal rate and regular rhythm  No murmur heard  Pulmonary/Chest: Effort normal and breath sounds normal  No respiratory distress  Abdominal: Soft  Bowel sounds are normal  She exhibits no distension  There is no tenderness  Musculoskeletal: Normal range of motion  She exhibits no edema     Lymphadenopathy: She has no cervical adenopathy  She has no axillary adenopathy  Right: No inguinal and no supraclavicular adenopathy present  Left: No inguinal and no supraclavicular adenopathy present  Neurological: She is alert and oriented to person, place, and time  No cranial nerve deficit  Skin: Skin is warm  No rash noted  No pallor  Psychiatric: She has a normal mood and affect  Thought content normal        Result Review  Labs:   patient has all labs completed at [facility]      Imaging:     Please note: This report has been generated by a voice recognition software system  Therefore there may be syntax, spelling, and/or grammatical errors  Please call if you have any questions

## 2020-07-02 NOTE — PROGRESS NOTES
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 over 10 years ago  Patient's blood counts now demonstrated more steadily consistent neutropenia and patient was referred back to the office for re-evaluation  Patient is asymptomatic without recurring infections, progressive and profound fatigue, drenching night sweats, and fevers  Patient has a total of two sinus infections a year and 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  I discussed with the patient repeating a workup which would include flow cytometry, substrate deficiencies, chronic viral infections and inflammatory markers  Patient does have Hashimoto's thyroiditis and is possible that neutropenia is related to underlying autoimmune disorder however, it is also disease is likely that the patient has a congenital neutropenia  I also briefly discussed bone marrow biopsy  Patient understands it is a possibility in the future  She agrees that blood work is sufficent as of right now as she is asymptomatic  Again reviewed signs and symptoms of progressive neutropenia  If patient experiences these obviously, additional workup would be needed again at that time  Given that the patient is asymptomatic we will complete the blood work and I will follow up with her in 2 weeks  We will remain available to the patient and her other providers as needed  - CBC and differential; Future  - Chronic Hepatitis Panel; Future  - Comprehensive metabolic panel; Future  - Copper Level; Future  - C-reactive protein; Future  - Folate; Future  - Leukemia/Lymphoma flow cytometry;  Future  - Methylmalonic acid, serum; Future  - Rapid HIV 1/2 AB-AG Combo; Future  - Sedimentation rate, automated; Future  - CBC and differential  - Comprehensive metabolic panel  - Copper Level  - C-reactive protein  - Folate  - Leukemia/Lymphoma flow cytometry  - Methylmalonic acid, serum  - Sedimentation rate, automated      The patient is scheduled for follow-up in approximately 3 weeks  Patient voiced agreement and understanding to the above  Patient knows to call the Hematology/Oncology office with any questions and concerns regarding the above  I have spent 45minutes with Patient  today in which greater than 50% of this time was spent in counseling/coordination of care regarding Diagnostic results, Prognosis, Intructions for management and Impressions  Goals and Barriers:    Current Goal:   Prolong Survival from Cancer  Barriers: None  Patient's Capacity to Self Care:  Patient able to self care   -------------------------------------------------------------------------------------------------------    Chief Complaint   Patient presents with    Consult       Referring provider:  Meño Angelo MD  [address]    History of present illness: This is a 27-year-old female with past medical history chronic asthma,   Chronic allergies taking Allegra once a day secondary to generalized pruritus/hisatime reaction, hypertension, Hashimoto's thyroiditis on Synthroid status post partial thyroidectomy, hiatal hernia and GERD controlled by dietary adjustments who was referred to [facility] hematology from her endocrinologist for evaluation of mild thrombocytopenia (Dr Jn Bateman)  Patient notes that she has had neutropenia and leukopenia for years  Patient states that she believes she was evaluated approximately 10 years ago secondary for similar issue  At that time, her blood counts were cyclical and her neutropenia would resolve spontaneously    Her white blood cell count has never been very robust in normally ranges around 4000 per unit L neutropenia is also regularly below 2000 per unit L      patient was referred to come back as patient's counts had stopped cycling and were consistently in the abnormal low range  Patient denies having bone marrow biopsy completed previously  Patient does admit to need for B12 injections as a child, unsure of situation surrounding this  Patient denies profound fatigue however does admit to some mild fatigue compared to the time last year secondary to COVID-19, chronic anxiety and depression related to the pandemic  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  Patient notes that she has hot flashes but denies drenching night sweats  Patient has an average of 2 infections a year  She states that compared to 10 years ago she would have may be 1 but denies issues with recurring fevers of unknown origin and states that sinus infections completely resolved with the administration of antibiotics  Interestingly,  She denies fever with presentation of sinus infection ( likely viral )     laboratory results:   10/29/12 WBC = 4 0, hemoglobin = 14 6, platelet count = 973, ANC = 1976   6/19/19 WBC = 3 3, hemoglobin = 13 4, platelet count = 136, ANC = 1490   12/7/19 WBC = 3 8, hemoglobin = 13 4, platelet count = 826, ANC = 1486   6/12/20 WBC = 3 4, hemoglobin = 13 3, platelet count = 813, ANC = 1289    Last colonoscopy/Endoscopy: 8/2019  Mammo and Pap : UTD within th elast year with Transvaginal u/s    Review of Systems   Constitutional: Positive for fatigue (mild, not progressive)  Negative for appetite change, fever and unexpected weight change  HENT: Positive for voice change (relates to chronic GERD, last endo 8/19)  Negative for nosebleeds  Respiratory: Negative for cough, choking and shortness of breath  Negative hemoptysis  Cardiovascular: Negative for chest pain, palpitations and leg swelling  Gastrointestinal: Negative  Negative for abdominal distention, abdominal pain, anal bleeding, blood in stool, constipation, diarrhea, nausea and vomiting  Endocrine: Negative  Negative for cold intolerance  Genitourinary: Negative  Negative for hematuria, menstrual problem, vaginal bleeding, vaginal discharge and vaginal pain  Patient has uterine fibroids for which she is observed with ultrasounds every 6 months to 1 year intervals  Musculoskeletal: Positive for back pain (herniated disc)  Negative for arthralgias, myalgias, neck pain and neck stiffness  Skin: Negative  Negative for color change, pallor and rash  Allergic/Immunologic: Negative  Negative for immunocompromised state  Neurological: Negative  Negative for weakness and headaches  Hematological: Negative for adenopathy  Does not bruise/bleed easily  All other systems reviewed and are negative        Patient Active Problem List   Diagnosis    Asthma    Leiomyoma of uterus    Chronic pain of left knee    Primary osteoarthritis of left knee    Prolapse of anterior vaginal wall    Stress incontinence in female       Past Medical History:   Diagnosis Date    Asthma     Disease of thyroid gland     Hashimoto's    Fibroid     Hypertension     Primary osteoarthritis of knee     Left - last assessed: Aug 23, 2017    Seasonal allergies     Varicella        Past Surgical History:   Procedure Laterality Date    APPENDECTOMY      DIAGNOSTIC LAPAROSCOPY      HAND SURGERY Left     THYROIDECTOMY  1998    partial       Family History   Problem Relation Age of Onset    Hypertension Mother     Hyperlipidemia Mother     Skin cancer Mother    Tawnya Ge Stroke Mother     Diabetes Father     Heart attack Father     Stroke Father     Skin cancer Father     Breast cancer Maternal Aunt         > 48    Colon cancer Maternal Grandmother         >50    No Known Problems Daughter     No Known Problems Maternal Aunt     No Known Problems Maternal Aunt        Social History     Socioeconomic History    Marital status:      Spouse name: Not on file    Number of children: Not on file    Years of education: Not on file    Highest education level: Not on file   Occupational History    Not on file   Social Needs    Financial resource strain: Not on file    Food insecurity:     Worry: Not on file     Inability: Not on file    Transportation needs:     Medical: Not on file     Non-medical: Not on file   Tobacco Use    Smoking status: Never Smoker    Smokeless tobacco: Never Used   Substance and Sexual Activity    Alcohol use: Yes    Drug use: No    Sexual activity: Not Currently     Birth control/protection: Post-menopausal   Lifestyle    Physical activity:     Days per week: Not on file     Minutes per session: Not on file    Stress: Not on file   Relationships    Social connections:     Talks on phone: Not on file     Gets together: Not on file     Attends Anglican service: Not on file     Active member of club or organization: Not on file     Attends meetings of clubs or organizations: Not on file     Relationship status: Not on file    Intimate partner violence:     Fear of current or ex partner: Not on file     Emotionally abused: Not on file     Physically abused: Not on file     Forced sexual activity: Not on file   Other Topics Concern    Not on file   Social History Narrative    Not on file         Current Outpatient Medications:     ALPRAZolam (XANAX) 0 25 mg tablet, TAKE 2 TABLETS DAILY FOR ANXIETY, Disp: , Rfl: 2    ascorbic acid (VITAMIN C) 500 mg tablet, Vitamin C 500 mg tablet  TAKE 1 TABLET DAILY FOR 50 DAYS, Disp: , Rfl:     ASMANEX 30 METERED DOSES 110 MCG/INH AEPB inhaler, Inhale 1 puff daily, Disp: , Rfl: 1    Azelastine HCl 0 15 % SOLN, INSERT 2 SPRAYS 2 TIMES DAILY  , Disp: , Rfl: 0    azithromycin (ZITHROMAX) 250 mg tablet, , Disp: , Rfl:     Brimonidine Tartrate (MIRVASO) 0 33 % GEL, Mirvaso 0 33 % topical gel, Disp: , Rfl:     Calcium Ascorbate 500 MG TABS, TAKE 1 TABLET DAILY FOR 50 DAYS, Disp: , Rfl:     cefadroxil (DURICEF) 500 mg capsule, , Disp: , Rfl:     chlorhexidine (PERIDEX) 0 12 % solution, RINSE 1/2 OUNCE TWICE A DAY FOR 30 SECONDS EXPECTORATE   NO NOT RINSE, Disp: , Rfl: 0    Cholecalciferol (VITAMIN D-3) 5000 units TABS, , Disp: , Rfl:     Cholecalciferol 76197 units CAPS, , Disp: , Rfl:     diclofenac sodium (Voltaren) 1 %, Voltaren 1 % topical gel  APPLY 2 GRAM TO THE AFFECTED AREA(S) BY TOPICAL ROUTE 4 TIMES PER DAY, Disp: , Rfl:     EPINEPHrine (EPIPEN 2-PHOENIX) 0 3 mg/0 3 mL SOAJ, EpiPen 2-Phoenix 0 3 mg/0 3 mL injection, auto-injector, Disp: , Rfl:     estradiol (ESTRACE VAGINAL) 0 1 mg/g vaginal cream, INSERT ONE GRAM DAILY VAGINALLY AT NIGHT FOR 14 DAYS, THEN TWICE WEEKLY FOR MAINTENANCE , Disp: , Rfl:     fexofenadine (ALLEGRA) 180 MG tablet, Take 180 mg by mouth daily  , Disp: , Rfl:     Fexofenadine HCl (ALLEGRA PO), take 2 tablet (60MG)  by oral route 2 times every day, Disp: , Rfl:     fluticasone (FLONASE) 50 mcg/act nasal spray, fluticasone propionate 50 mcg/actuation nasal spray,suspension, Disp: , Rfl:     labetalol (NORMODYNE) 100 mg tablet, , Disp: , Rfl:     levothyroxine (SYNTHROID) 137 mcg tablet, Synthroid 137 mcg tablet, Disp: , Rfl:     levothyroxine 125 mcg tablet, Take 125 mcg by mouth daily  125 mcg & 137 mcg alternate days  , Disp: , Rfl:     Lidocaine Viscous HCl (XYLOCAINE) 2 % mucosal solution, USE 5 ML BY MOUTH 4 TIMES DAILY (BEFORE MEALS AND AT BEDTIME) GARGLE FOR THROAT PAIN , Disp: , Rfl: 0    loratadine (CLARITIN) 10 mg tablet, TAKE 1T ABLET BY MOUTH DAILY  , Disp: , Rfl: 0    losartan (COZAAR) 50 mg tablet, , Disp: , Rfl:     mometasone (NASONEX) 50 mcg/act nasal spray, mometasone 50 mcg/actuation nasal spray, Disp: , Rfl:     olopatadine (PATANOL) 0 1 % ophthalmic solution, olopatadine 0 1 % eye drops, Disp: , Rfl:     ORACEA 40 MG capsule, Take 40 mg by mouth daily, Disp: , Rfl: 3    pantoprazole (PROTONIX) 40 mg tablet, , Disp: , Rfl:     Salicylic Acid 6 % CREA, salicylic acid 6 % topical cream, Disp: , Rfl:     urea (CARMOL) 40 %, urea 40 % topical cream, Disp: , Rfl:     amoxicillin (AMOXIL) 500 mg capsule, Take 500 mg by mouth 3 (three) times a day, Disp: , Rfl: 0    amoxicillin-clavulanate (AUGMENTIN) 875-125 mg per tablet, Take 1 tablet by mouth every 12 (twelve) hours, Disp: , Rfl: 0    No Known Allergies    Objective:  /88 (BP Location: Left arm)   Pulse 82   Temp 97 9 °F (36 6 °C) (Tympanic)   Resp 16   Ht 5' 3" (1 6 m)   Wt 77 6 kg (171 lb)   LMP  (LMP Unknown)   BMI 30 29 kg/m²   Physical Exam   Constitutional: She is oriented to person, place, and time  She appears well-developed and well-nourished  No distress  HENT:   Head: Normocephalic and atraumatic  Mouth/Throat: Oropharynx is clear and moist  No oropharyngeal exudate  Eyes: Pupils are equal, round, and reactive to light  EOM are normal  No scleral icterus  Neck: Normal range of motion  Cardiovascular: Normal rate and regular rhythm  No murmur heard  Pulmonary/Chest: Effort normal and breath sounds normal  No respiratory distress  Abdominal: Soft  Bowel sounds are normal  She exhibits no distension  There is no tenderness  Musculoskeletal: Normal range of motion  She exhibits no edema  Lymphadenopathy:     She has no cervical adenopathy  She has no axillary adenopathy  Right: No inguinal and no supraclavicular adenopathy present  Left: No inguinal and no supraclavicular adenopathy present  Neurological: She is alert and oriented to person, place, and time  No cranial nerve deficit  Skin: Skin is warm  No rash noted  No pallor  Psychiatric: She has a normal mood and affect  Thought content normal        Result Review  Labs:   patient has all labs completed at [facility]      Imaging:     Please note:   This report has been generated by a voice recognition software system  Therefore there may be syntax, spelling, and/or grammatical errors  Please call if you have any questions

## 2020-07-17 ENCOUNTER — TELEPHONE (OUTPATIENT)
Dept: HEMATOLOGY ONCOLOGY | Facility: CLINIC | Age: 60
End: 2020-07-17

## 2020-07-24 ENCOUNTER — TRANSCRIBE ORDERS (OUTPATIENT)
Dept: LAB | Facility: CLINIC | Age: 60
End: 2020-07-24

## 2020-07-24 ENCOUNTER — APPOINTMENT (OUTPATIENT)
Dept: LAB | Facility: CLINIC | Age: 60
End: 2020-07-24
Payer: COMMERCIAL

## 2020-07-24 DIAGNOSIS — D70.9 NEUTROPENIA, UNSPECIFIED TYPE (HCC): Primary | ICD-10-CM

## 2020-07-24 DIAGNOSIS — D70.9 NEUTROPENIA, UNSPECIFIED TYPE (HCC): ICD-10-CM

## 2020-07-24 LAB
ALBUMIN SERPL BCP-MCNC: 3.7 G/DL (ref 3.5–5)
ALP SERPL-CCNC: 109 U/L (ref 46–116)
ALT SERPL W P-5'-P-CCNC: 31 U/L (ref 12–78)
ANION GAP SERPL CALCULATED.3IONS-SCNC: 6 MMOL/L (ref 4–13)
AST SERPL W P-5'-P-CCNC: 12 U/L (ref 5–45)
BILIRUB SERPL-MCNC: 0.42 MG/DL (ref 0.2–1)
BUN SERPL-MCNC: 20 MG/DL (ref 5–25)
CALCIUM SERPL-MCNC: 8.6 MG/DL (ref 8.3–10.1)
CHLORIDE SERPL-SCNC: 103 MMOL/L (ref 100–108)
CO2 SERPL-SCNC: 28 MMOL/L (ref 21–32)
CREAT SERPL-MCNC: 0.68 MG/DL (ref 0.6–1.3)
GFR SERPL CREATININE-BSD FRML MDRD: 95 ML/MIN/1.73SQ M
GLUCOSE SERPL-MCNC: 82 MG/DL (ref 65–140)
POTASSIUM SERPL-SCNC: 3.9 MMOL/L (ref 3.5–5.3)
PROT SERPL-MCNC: 6.8 G/DL (ref 6.4–8.2)
SODIUM SERPL-SCNC: 137 MMOL/L (ref 136–145)

## 2020-07-24 PROCEDURE — 36415 COLL VENOUS BLD VENIPUNCTURE: CPT

## 2020-07-24 PROCEDURE — 88184 FLOWCYTOMETRY/ TC 1 MARKER: CPT

## 2020-07-24 PROCEDURE — 80053 COMPREHEN METABOLIC PANEL: CPT

## 2020-07-24 PROCEDURE — 83918 ORGANIC ACIDS TOTAL QUANT: CPT

## 2020-07-24 PROCEDURE — 82525 ASSAY OF COPPER: CPT

## 2020-07-24 PROCEDURE — 88185 FLOWCYTOMETRY/TC ADD-ON: CPT

## 2020-07-27 LAB — SCAN RESULT: NORMAL

## 2020-07-28 NOTE — PROGRESS NOTES
I spoke to the patient and reviewed her results per the informaiton provided by DARRELL Wells  I informed her that all the labs that came back were good, the lab orders that were ordered were not all completed, and that we will place new orders for the labs that were missed  I informed her that once those labs are completed she can call our office to R/S her appt that she has on 7/24/20  I called [facility] and informed them that the patient should not be changed for the CLL/Lymphoma test that was completed because that is not the correct test that was ordered  I also asked why the other tests were not completed and was informed that they did not received enough blood specimen to complete the rest of the ordered tests  Patient had her missed labs completed on 7/24/20, copper level and Methylmalonic are still in process  The patient was instructed to call the office so we can get her scheduled for an office appt with you to review the remaining labs

## 2020-07-29 ENCOUNTER — TELEPHONE (OUTPATIENT)
Dept: HEMATOLOGY ONCOLOGY | Facility: CLINIC | Age: 60
End: 2020-07-29

## 2020-07-29 LAB
COPPER SERPL-MCNC: 91 UG/DL (ref 72–166)
METHYLMALONATE SERPL-SCNC: 117 NMOL/L (ref 0–378)
SL AMB DISCLAIMER: NORMAL

## 2020-07-29 NOTE — TELEPHONE ENCOUNTER
Patient called to make an Virtual appointment  Appointment scheduled with:  Bo Jordan   Appointment date and time: 08/04 at 9:30am               [place] location:   [facility]   Reason: Follow up                Patient verbalized understanding of above

## 2020-07-31 ENCOUNTER — TELEPHONE (OUTPATIENT)
Dept: HEMATOLOGY ONCOLOGY | Facility: CLINIC | Age: 60
End: 2020-07-31

## 2020-07-31 ENCOUNTER — TELEMEDICINE (OUTPATIENT)
Dept: HEMATOLOGY ONCOLOGY | Facility: CLINIC | Age: 60
End: 2020-07-31
Payer: COMMERCIAL

## 2020-07-31 DIAGNOSIS — D70.8 OTHER NEUTROPENIA (HCC): Primary | ICD-10-CM

## 2020-07-31 PROCEDURE — 99215 OFFICE O/P EST HI 40 MIN: CPT | Performed by: PHYSICIAN ASSISTANT

## 2020-07-31 NOTE — PATIENT INSTRUCTIONS
Emily Rolon,    It was very nice to talk to you today  We discussed concerns regarding neutropenia  If you experience profound and progressive fatigue, drenching night sweats and recurring fever, please contact the office for further investigation  We discussed following up with a CBC in six months  I will see you in the office that time  If at any time between now and our six month appointment you decide would like to follow-up with bone marrow biopsy, please contact office  We will set this up for you and arrange follow-up to discuss results of this study  Have a good summer/fall and see you in the winter!     Sera Brown PA-C

## 2020-07-31 NOTE — PROGRESS NOTES
[address] [facility] [id number]  [facility] Hematology Ambulatory Follow-Up  Keny Rogers, 1960, [id number]  7/31/2020    Assessment/Plan:    1  Other neutropenia ([facility] [address] )  This is a 70-year-old female with history of neutropenia for over 10 years  Blood counts now demonstrating more steadily consistent neutropenia was referred back to the Hematology office for re-evaluation  Blood work to ascertain potential etiologies of low white blood cell count were completed in include negative chronic infections such as HIV, chronic hepatitis, copper, MMA and folate were all within normal limits  Flow cytometry was completed and was negative for abnormality  Repeat CBC demonstrated similar findings  Given the clinical history and the negative workup, I believe that patient's neutropenia is likely due to intrinsic factors  Over the past 10 years ANC and white blood cell count has lowered slightly  However, the patient is not symptomatic of low ANC/neutropenia  Patient is some concerns regarding neutropenia given the global pandemic  We discussed that a definitive test such as bone marrow biopsy would be helpful to clarify things further  However, the patient feels that this isn't a period time  We discussed that if she develops symptoms such as progressive and profound fatigue, drenching night sweats and recurring fevers, the bone marrow biopsy would definitely be indicated at that time  Patient will call the office with any questions or concerns  In the meantime we will follow the patient with blood work every six months and an office visit  Patient voiced agreement to this plan  Patient    Patient also states that if blood work was consistent over the next year, she would like to follow-up primary care provider  The patient is scheduled for follow-up in approximately 6 months     Patient voiced agreement and understanding to the above  Patient knows to call the Hematology/Oncology office with any questions and concerns regarding the above  Barrier(s) to care: None  The patient is able to self care   ------------------------------------------------------------------------------------------------------    Chief Complaint   Patient presents with    Follow-up     neutropenia       History of present illness: This is a 79-year-old female with past medical history chronic asthma,   Chronic allergies taking Allegra once a day secondary to generalized pruritus/hisatime reaction, hypertension, Hashimoto's thyroiditis on Synthroid status post partial thyroidectomy, hiatal hernia and GERD controlled by dietary adjustments who was referred to [facility] from her endocrinologist for evaluation of mild thrombocytopenia (Dr Irais Villalba)        Patient notes that she has had neutropenia and leukopenia for years  Patient states that she believes she was evaluated approximately 10 years ago secondary for similar issue  At that time, her blood counts were cyclical and her neutropenia would resolve spontaneously  Her white blood cell count has never been very robust in normally ranges around 4000 per unit L neutropenia is also regularly below 2000 per unit L       patient was referred to come back as patient's counts had stopped cycling and were consistently in the abnormal low range        Patient denies having bone marrow biopsy completed previously  Patient does admit to need for B12 injections as a child, unsure of situation surrounding this        Patient denies profound fatigue however does admit to some mild fatigue compared to the time last year secondary to COVID-19, chronic anxiety and depression related to the pandemic  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  Patient notes that she has hot flashes but denies drenching night sweats    Patient has an average of 2 infections a year  She states that compared to 10 years ago she would have may be 1 but denies issues with recurring fevers of unknown origin and states that sinus infections completely resolved with the administration of antibiotics  Interestingly,  She denies fever with presentation of sinus infection ( likely viral )      laboratory results:   10/29/12 WBC = 4 0, hemoglobin = 14 6, platelet count = 931, ANC = 1976   6/19/19 WBC = 3 3, hemoglobin = 13 4, platelet count = 164, ANC = 1490   12/7/19 WBC = 3 8, hemoglobin = 13 4, platelet count = 210, ANC = 1486   6/12/20 WBC = 3 4, hemoglobin = 13 3, platelet count = 249, ANC = 1289     Last colonoscopy/Endoscopy: 8/2019  Mammo and Pap : UTD within th elast year with Transvaginal u/s    7/18/2020: Folate = 8 7, MMA = 117, copper = 91, comprehensive metabolic panel = WNL- no abnormal tests, total white blood cell count = 3 4, hemoglobin = 13 4, hematocrit = 41 2, platelet count = 398, ANC = 13 40, CRP and sed rates within normal limits, HIV and hepatitis screen including AB and C all negative, flow cytometry= within normal limits    Interval history:  No major interval changes  However, the patient states that she feels concerned that she had three illnesses last fall compared to no illnesses from previous years        Patient Active Problem List   Diagnosis    Asthma    Leiomyoma of uterus    Chronic pain of left knee    Primary osteoarthritis of left knee    Prolapse of anterior vaginal wall    Stress incontinence in female       Past Medical History:   Diagnosis Date    Asthma     Disease of thyroid gland     Hashimoto's    Fibroid     Hypertension     Primary osteoarthritis of knee     Left - last assessed: Aug 23, 2017    Seasonal allergies     Varicella        Past Surgical History:   Procedure Laterality Date    APPENDECTOMY      DIAGNOSTIC LAPAROSCOPY      HAND SURGERY Left     THYROIDECTOMY  1998    partial       Family History   Problem Relation Age of Onset    Hypertension Mother     Hyperlipidemia Mother     Skin cancer Mother     Stroke Mother     Diabetes Father     Heart attack Father     Stroke Father     Skin cancer Father     Breast cancer Maternal Aunt         > 48    Colon cancer Maternal Grandmother         >50    No Known Problems Daughter     No Known Problems Maternal Aunt     No Known Problems Maternal Aunt        Social History     Socioeconomic History    Marital status:      Spouse name: Not on file    Number of children: Not on file    Years of education: Not on file    Highest education level: Not on file   Occupational History    Not on file   Social Needs    Financial resource strain: Not on file    Food insecurity:     Worry: Not on file     Inability: Not on file    Transportation needs:     Medical: Not on file     Non-medical: Not on file   Tobacco Use    Smoking status: Never Smoker    Smokeless tobacco: Never Used   Substance and Sexual Activity    Alcohol use:  Yes    Drug use: No    Sexual activity: Not Currently     Birth control/protection: Post-menopausal   Lifestyle    Physical activity:     Days per week: Not on file     Minutes per session: Not on file    Stress: Not on file   Relationships    Social connections:     Talks on phone: Not on file     Gets together: Not on file     Attends Christianity service: Not on file     Active member of club or organization: Not on file     Attends meetings of clubs or organizations: Not on file     Relationship status: Not on file    Intimate partner violence:     Fear of current or ex partner: Not on file     Emotionally abused: Not on file     Physically abused: Not on file     Forced sexual activity: Not on file   Other Topics Concern    Not on file   Social History Narrative    Not on file         Current Outpatient Medications:     ALPRAZolam (XANAX) 0 25 mg tablet, TAKE 2 TABLETS DAILY FOR ANXIETY, Disp: , Rfl: 2    amoxicillin (AMOXIL) 500 mg capsule, Take 500 mg by mouth 3 (three) times a day, Disp: , Rfl: 0    amoxicillin-clavulanate (AUGMENTIN) 875-125 mg per tablet, Take 1 tablet by mouth every 12 (twelve) hours, Disp: , Rfl: 0    ascorbic acid (VITAMIN C) 500 mg tablet, Vitamin C 500 mg tablet  TAKE 1 TABLET DAILY FOR 50 DAYS, Disp: , Rfl:     ASMANEX 30 METERED DOSES 110 MCG/INH AEPB inhaler, Inhale 1 puff daily, Disp: , Rfl: 1    Azelastine HCl 0 15 % SOLN, INSERT 2 SPRAYS 2 TIMES DAILY  , Disp: , Rfl: 0    azithromycin (ZITHROMAX) 250 mg tablet, , Disp: , Rfl:     Brimonidine Tartrate (MIRVASO) 0 33 % GEL, Mirvaso 0 33 % topical gel, Disp: , Rfl:     Calcium Ascorbate 500 MG TABS, TAKE 1 TABLET DAILY FOR 50 DAYS, Disp: , Rfl:     cefadroxil (DURICEF) 500 mg capsule, , Disp: , Rfl:     chlorhexidine (PERIDEX) 0 12 % solution, RINSE 1/2 OUNCE TWICE A DAY FOR 30 SECONDS EXPECTORATE   NO NOT RINSE, Disp: , Rfl: 0    Cholecalciferol (VITAMIN D-3) 5000 units TABS, , Disp: , Rfl:     Cholecalciferol 14692 units CAPS, , Disp: , Rfl:     diclofenac sodium (Voltaren) 1 %, Voltaren 1 % topical gel  APPLY 2 GRAM TO THE AFFECTED AREA(S) BY TOPICAL ROUTE 4 TIMES PER DAY, Disp: , Rfl:     EPINEPHrine (EPIPEN 2-PHOENIX) 0 3 mg/0 3 mL SOAJ, EpiPen 2-Phoenix 0 3 mg/0 3 mL injection, auto-injector, Disp: , Rfl:     estradiol (ESTRACE VAGINAL) 0 1 mg/g vaginal cream, INSERT ONE GRAM DAILY VAGINALLY AT NIGHT FOR 14 DAYS, THEN TWICE WEEKLY FOR MAINTENANCE , Disp: , Rfl:     fexofenadine (ALLEGRA) 180 MG tablet, Take 180 mg by mouth daily  , Disp: , Rfl:     Fexofenadine HCl (ALLEGRA PO), take 2 tablet (60MG)  by oral route 2 times every day, Disp: , Rfl:     fluticasone (FLONASE) 50 mcg/act nasal spray, fluticasone propionate 50 mcg/actuation nasal spray,suspension, Disp: , Rfl:     labetalol (NORMODYNE) 100 mg tablet, , Disp: , Rfl:     levothyroxine (SYNTHROID) 137 mcg tablet, Synthroid 137 mcg tablet, Disp: , Rfl:     levothyroxine 125 mcg tablet, Take 125 mcg by mouth daily  125 mcg & 137 mcg alternate days  , Disp: , Rfl:     Lidocaine Viscous HCl (XYLOCAINE) 2 % mucosal solution, USE 5 ML BY MOUTH 4 TIMES DAILY (BEFORE MEALS AND AT BEDTIME) GARGLE FOR THROAT PAIN , Disp: , Rfl: 0    loratadine (CLARITIN) 10 mg tablet, TAKE 1T ABLET BY MOUTH DAILY  , Disp: , Rfl: 0    losartan (COZAAR) 50 mg tablet, , Disp: , Rfl:     mometasone (NASONEX) 50 mcg/act nasal spray, mometasone 50 mcg/actuation nasal spray, Disp: , Rfl:     olopatadine (PATANOL) 0 1 % ophthalmic solution, olopatadine 0 1 % eye drops, Disp: , Rfl:     ORACEA 40 MG capsule, Take 40 mg by mouth daily, Disp: , Rfl: 3    pantoprazole (PROTONIX) 40 mg tablet, , Disp: , Rfl:     Salicylic Acid 6 % CREA, salicylic acid 6 % topical cream, Disp: , Rfl:     urea (CARMOL) 40 %, urea 40 % topical cream, Disp: , Rfl:     No Known Allergies    Objective:  LMP  (LMP Unknown)      Result Review  Labs:  Appointment on 07/24/2020   Component Date Value Ref Range Status    Scan Result 07/24/2020 SEE WRITTEN REPORT   Final    Methylmalonic Acid, S 07/24/2020 117  0 - 378 nmol/L Final    Disclaimer: 07/24/2020 Comment   Final    This test was developed and its performance characteristics  determined by [facility]  It has not been cleared or approved  by the Food and Drug Administration      Copper 07/24/2020 91  72 - 166 ug/dL Final                                    Detection Limit = 5    Sodium 07/24/2020 137  136 - 145 mmol/L Final    Potassium 07/24/2020 3 9  3 5 - 5 3 mmol/L Final    Chloride 07/24/2020 103  100 - 108 mmol/L Final    CO2 07/24/2020 28  21 - 32 mmol/L Final    ANION GAP 07/24/2020 6  4 - 13 mmol/L Final    BUN 07/24/2020 20  5 - 25 mg/dL Final    Creatinine 07/24/2020 0 68  0 60 - 1 30 mg/dL Final    Standardized to IDMS reference method    Glucose 07/24/2020 82  65 - 140 mg/dL Final      If the patient is fasting, the ADA then defines impaired fasting glucose as > 100 mg/dL and diabetes as > or equal to 123 mg/dL  Specimen collection should occur prior to Sulfasalazine administration due to the potential for falsely depressed results  Specimen collection should occur prior to Sulfapyridine administration due to the potential for falsely elevated results   Calcium 07/24/2020 8 6  8 3 - 10 1 mg/dL Final    AST 07/24/2020 12  5 - 45 U/L Final      Specimen collection should occur prior to Sulfasalazine administration due to the potential for falsely depressed results   ALT 07/24/2020 31  12 - 78 U/L Final      Specimen collection should occur prior to Sulfasalazine administration due to the potential for falsely depressed results   Alkaline Phosphatase 07/24/2020 109  46 - 116 U/L Final    Total Protein 07/24/2020 6 8  6 4 - 8 2 g/dL Final    Albumin 07/24/2020 3 7  3 5 - 5 0 g/dL Final    Total Bilirubin 07/24/2020 0 42  0 20 - 1 00 mg/dL Final      Use of this assay is not recommended for patients undergoing treatment with eltrombopag due to the potential for falsely elevated results   eGFR 07/24/2020 95  ml/min/1 73sq m Final       Please note: This report has been generated by a voice recognition software system  Therefore there may be syntax, spelling, and/or grammatical errors  Please call if you have any questions  Virtual Regular Visit      Assessment/Plan:    Problem List Items Addressed This Visit     None      Visit Diagnoses     Other neutropenia ([facility] [address] )    -  Primary    Relevant Orders    CBC and differential               Reason for visit is   Chief Complaint   Patient presents with    Follow-up     neutropenia        Encounter provider Jimmie Perez PA-C    Provider located at [address] [address]      Recent Visits  No visits were found meeting these conditions     Showing recent visits within past 7 days and meeting all other requirements Today's Visits  Date Type Provider Dept   07/31/20 Telemedicine Nilsa Topete PA-C [facility]   07/31/20 Telephone Nita gordon, [address] [place] [facility]   Showing today's visits and meeting all other requirements     Future Appointments  Date Type Provider Dept   07/31/20 Telemedicine Jeffrey Tena PA-C [facility] [place]   Showing future appointments within next 150 days and meeting all other requirements        The patient was identified by name and date of birth  Deepthi Lockett was informed that this is a telemedicine visit and that the visit is being conducted through [employer]  My office door was closed  No one else was in the room  She acknowledged consent and understanding of privacy and security of the video platform  The patient has agreed to participate and understands they can discontinue the visit at any time  Patient is aware this is a billable service  Shy Toribio is a 60 y o  female          HPI - see above    Past Medical History:   Diagnosis Date    Asthma     Disease of thyroid gland     Hashimoto's    Fibroid     Hypertension     Primary osteoarthritis of knee     Left - last assessed: Aug 23, 2017    Seasonal allergies     Varicella        Past Surgical History:   Procedure Laterality Date    APPENDECTOMY      DIAGNOSTIC LAPAROSCOPY      HAND SURGERY Left     THYROIDECTOMY  1998    partial       Current Outpatient Medications   Medication Sig Dispense Refill    ALPRAZolam (XANAX) 0 25 mg tablet TAKE 2 TABLETS DAILY FOR ANXIETY  2    amoxicillin (AMOXIL) 500 mg capsule Take 500 mg by mouth 3 (three) times a day  0    amoxicillin-clavulanate (AUGMENTIN) 875-125 mg per tablet Take 1 tablet by mouth every 12 (twelve) hours  0    ascorbic acid (VITAMIN C) 500 mg tablet Vitamin C 500 mg tablet   TAKE 1 TABLET DAILY FOR 50 DAYS      ASMANEX 30 METERED DOSES 110 MCG/INH AEPB inhaler Inhale 1 puff daily  1    Azelastine HCl 0 15 % SOLN INSERT 2 SPRAYS 2 TIMES DAILY  0    azithromycin (ZITHROMAX) 250 mg tablet       Brimonidine Tartrate (MIRVASO) 0 33 % GEL Mirvaso 0 33 % topical gel      Calcium Ascorbate 500 MG TABS TAKE 1 TABLET DAILY FOR 50 DAYS      cefadroxil (DURICEF) 500 mg capsule       chlorhexidine (PERIDEX) 0 12 % solution RINSE 1/2 OUNCE TWICE A DAY FOR 30 SECONDS EXPECTORATE   NO NOT RINSE  0    Cholecalciferol (VITAMIN D-3) 5000 units TABS       Cholecalciferol 24223 units CAPS       diclofenac sodium (Voltaren) 1 % Voltaren 1 % topical gel   APPLY 2 GRAM TO THE AFFECTED AREA(S) BY TOPICAL ROUTE 4 TIMES PER DAY      EPINEPHrine (EPIPEN 2-PHOENIX) 0 3 mg/0 3 mL SOAJ EpiPen 2-Phoenix 0 3 mg/0 3 mL injection, auto-injector      estradiol (ESTRACE VAGINAL) 0 1 mg/g vaginal cream INSERT ONE GRAM DAILY VAGINALLY AT NIGHT FOR 14 DAYS, THEN TWICE WEEKLY FOR MAINTENANCE   fexofenadine (ALLEGRA) 180 MG tablet Take 180 mg by mouth daily   Fexofenadine HCl (ALLEGRA PO) take 2 tablet (60MG)  by oral route 2 times every day      fluticasone (FLONASE) 50 mcg/act nasal spray fluticasone propionate 50 mcg/actuation nasal spray,suspension      labetalol (NORMODYNE) 100 mg tablet       levothyroxine (SYNTHROID) 137 mcg tablet Synthroid 137 mcg tablet      levothyroxine 125 mcg tablet Take 125 mcg by mouth daily  125 mcg & 137 mcg alternate days   Lidocaine Viscous HCl (XYLOCAINE) 2 % mucosal solution USE 5 ML BY MOUTH 4 TIMES DAILY (BEFORE MEALS AND AT BEDTIME) GARGLE FOR THROAT PAIN   0    loratadine (CLARITIN) 10 mg tablet TAKE 1T ABLET BY MOUTH DAILY    0    losartan (COZAAR) 50 mg tablet       mometasone (NASONEX) 50 mcg/act nasal spray mometasone 50 mcg/actuation nasal spray      olopatadine (PATANOL) 0 1 % ophthalmic solution olopatadine 0 1 % eye drops      ORACEA 40 MG capsule Take 40 mg by mouth daily  3    pantoprazole (PROTONIX) 40 mg tablet       Salicylic Acid 6 % CREA salicylic acid 6 % topical cream      urea (CARMOL) 40 % urea 40 % topical cream       No current facility-administered medications for this visit  No Known Allergies    Review of Systems   Constitutional: Negative for activity change, chills, diaphoresis, fatigue and fever  Hematological: Negative for adenopathy  Does not bruise/bleed easily  All other systems reviewed and are negative  Video Exam    There were no vitals filed for this visit  Physical Exam   Constitutional: She is oriented to person, place, and time  She appears well-developed and well-nourished  No distress  HENT:   Head: Normocephalic and atraumatic  Eyes: Pupils are equal, round, and reactive to light  No scleral icterus  Pulmonary/Chest: Effort normal  No respiratory distress  Musculoskeletal: She exhibits no edema  Neurological: She is alert and oriented to person, place, and time  Skin: Skin is warm  No rash noted  No pallor  Psychiatric: She has a normal mood and affect  Thought content normal         VIRTUAL VISIT DISCLAIMER    Shira Echeverria acknowledges that she has consented to an online visit or consultation  She understands that the online visit is based solely on information provided by her, and that, in the absence of a face-to-face physical evaluation by the physician, the diagnosis she receives is both limited and provisional in terms of accuracy and completeness  This is not intended to replace a full medical face-to-face evaluation by the physician  Shira Echeverria understands and accepts these terms

## 2020-07-31 NOTE — TELEPHONE ENCOUNTER
Called Lesly Garrett twice but the number did not ring, I called Imeldakristopher Mariella and instructed her to have Lesly Garrett call our office to get information about her appt  Per DARRELL Soler, she wanted to know if the patient will be available for a virtual visit today, 7/31/20, with her at 3:00 pm  If the patient is not going to be available then we will review DARRELL Major's schedule and call the patient back with another appt date  If the patient will be available please let me know so that the appt can be changed and DARRELL Soler can be informed

## 2020-07-31 NOTE — TELEPHONE ENCOUNTER
Reviewed, informed DARRELL Roque and R/S the patients appt to today, 7/31/20, at 3:00 pm with DARRELL Roque

## 2020-08-14 ENCOUNTER — TELEPHONE (OUTPATIENT)
Dept: HEMATOLOGY ONCOLOGY | Facility: CLINIC | Age: 60
End: 2020-08-14

## 2020-08-14 NOTE — TELEPHONE ENCOUNTER
Patient has some questions regarding her diagnoses of Neutropenia   Please call patient back at [phone]

## 2020-08-14 NOTE — TELEPHONE ENCOUNTER
Returned call to patient  Questions asked and answered regarding her diagnosis of neutropenia  She had questions regarding virus vs infections  Information given for patient to call regarding her MyChart account    She verbalized understanding

## 2020-09-28 ENCOUNTER — ANNUAL EXAM (OUTPATIENT)
Dept: OBGYN CLINIC | Facility: CLINIC | Age: 60
End: 2020-09-28
Payer: COMMERCIAL

## 2020-09-28 VITALS
HEIGHT: 63 IN | WEIGHT: 168 LBS | DIASTOLIC BLOOD PRESSURE: 90 MMHG | BODY MASS INDEX: 29.77 KG/M2 | HEART RATE: 70 BPM | SYSTOLIC BLOOD PRESSURE: 130 MMHG

## 2020-09-28 DIAGNOSIS — Z01.411 ENCOUNTER FOR GYNECOLOGICAL EXAMINATION WITH ABNORMAL FINDING: Primary | ICD-10-CM

## 2020-09-28 DIAGNOSIS — N81.10 PROLAPSE OF ANTERIOR VAGINAL WALL: ICD-10-CM

## 2020-09-28 DIAGNOSIS — N39.3 STRESS INCONTINENCE IN FEMALE: ICD-10-CM

## 2020-09-28 DIAGNOSIS — Z12.31 ENCOUNTER FOR SCREENING MAMMOGRAM FOR BREAST CANCER: ICD-10-CM

## 2020-09-28 PROCEDURE — S0612 ANNUAL GYNECOLOGICAL EXAMINA: HCPCS | Performed by: NURSE PRACTITIONER

## 2020-09-28 NOTE — PROGRESS NOTES
Assessment / Plan    1  Encounter for gynecological examination with abnormal finding  Normal well woman exam   pap/hpv negative  Repeat   Up to date on colonoscopy  2  Encounter for screening mammogram for breast cancer  Has order and an appointment in 2020  3  Prolapse of anterior vaginal wall  In process of consulting with Urogynecology,planning repair with a sling  4  Stress incontinence in female  As above        Ángel Patel is a 60 y o  female who presents for her annual gynecologic exam     Last pap: 2019 ph negative  Last mammogram: 2019 negative; scheduled for 2020  Colonoscopy, 2019    Seeing Urogynecologist at [address] [place] [address] [address]  Planning repair of prolapse and a sling  Periods are absent  Current contraception: post menopausal status  History of abnormal Pap smear: yes -   Family history of breast,uterine, ovarian or colon cancer: yes - GM colon ca, aunt breast ca    Menstrual History:  OB History        2    Para   2    Term   2            AB        Living           SAB        TAB        Ectopic        Multiple        Live Births                     No LMP recorded (lmp unknown)  Patient is postmenopausal        The following portions of the patient's history were reviewed and updated as appropriate: allergies, current medications, past family history, past medical history, past social history, past surgical history and problem list     Review of Systems      Review of Systems   Constitutional: Negative for chills and fever  Respiratory: Negative for cough and shortness of breath  Gastrointestinal: Negative for abdominal distention, abdominal pain, blood in stool, constipation, diarrhea, nausea and vomiting  Genitourinary: Negative for difficulty urinating, dysuria, frequency, genital sores, hematuria, menstrual problem, pelvic pain, urgency, vaginal bleeding and vaginal discharge  Musculoskeletal: Negative for arthralgias and myalgias  Breasts:  Negative for skin changes, dimpling, asymmetry, nipple discharge, redness, tenderness or palpable masses      Objective      /90 (BP Location: Left arm, Patient Position: Sitting, Cuff Size: Standard)   Pulse 70   Ht 5' 3" (1 6 m)   Wt 76 2 kg (168 lb)   LMP  (LMP Unknown)   BMI 29 76 kg/m²      Physical Exam  Constitutional:       General: She is not in acute distress  Appearance: Normal appearance  She is well-developed  She is not ill-appearing or diaphoretic  HENT:      Head: Normocephalic and atraumatic  Eyes:      Pupils: Pupils are equal, round, and reactive to light  Neck:      Musculoskeletal: Neck supple  Thyroid: No thyromegaly  Pulmonary:      Effort: Pulmonary effort is normal    Chest:      Breasts: Breasts are symmetrical          Right: No inverted nipple, mass, nipple discharge, skin change or tenderness  Left: No inverted nipple, mass, nipple discharge, skin change or tenderness  Abdominal:      Palpations: Abdomen is soft  There is no mass  Tenderness: There is no abdominal tenderness  Genitourinary:     General: Normal vulva  Exam position: Lithotomy position  Labia:         Right: No rash, tenderness, lesion or injury  Left: No rash, tenderness, lesion or injury  Vagina: No signs of injury and foreign body  No vaginal discharge, erythema, tenderness or bleeding  Cervix: No cervical motion tenderness, discharge or friability  Uterus: Not enlarged and not tender  Adnexa:         Right: No mass, tenderness or fullness  Left: No mass, tenderness or fullness  Rectum: Normal       Comments: 2nd degree anterior vaginal wall prolapse  Lymphadenopathy:      Cervical: No cervical adenopathy  Upper Body:      Right upper body: No supraclavicular adenopathy  Left upper body: No supraclavicular adenopathy  Skin:     General: Skin is warm and dry     Neurological:      General: No focal deficit present  Mental Status: She is alert and oriented to person, place, and time  She is not disoriented  Psychiatric:         Mood and Affect: Mood normal          Behavior: Behavior normal          Thought Content:  Thought content normal          Judgment: Judgment normal

## 2020-10-23 ENCOUNTER — TELEPHONE (OUTPATIENT)
Dept: HEMATOLOGY ONCOLOGY | Facility: CLINIC | Age: 60
End: 2020-10-23

## 2020-11-25 ENCOUNTER — HOSPITAL ENCOUNTER (OUTPATIENT)
Dept: RADIOLOGY | Age: 60
Discharge: HOME | End: 2020-11-25
Payer: COMMERCIAL

## 2020-11-25 VITALS — BODY MASS INDEX: 28.17 KG/M2 | WEIGHT: 165 LBS | HEIGHT: 64 IN

## 2020-11-25 DIAGNOSIS — Z12.31 ENCOUNTER FOR SCREENING MAMMOGRAM FOR MALIGNANT NEOPLASM OF BREAST: ICD-10-CM

## 2020-11-25 PROCEDURE — 77067 SCR MAMMO BI INCL CAD: CPT

## 2020-11-25 PROCEDURE — 77063 BREAST TOMOSYNTHESIS BI: CPT

## 2021-01-13 ENCOUNTER — TELEPHONE (OUTPATIENT)
Dept: HEMATOLOGY ONCOLOGY | Facility: MEDICAL CENTER | Age: 61
End: 2021-01-13

## 2021-01-23 ENCOUNTER — IMMUNIZATIONS (OUTPATIENT)
Dept: FAMILY MEDICINE CLINIC | Facility: HOSPITAL | Age: 61
End: 2021-01-23

## 2021-01-23 DIAGNOSIS — Z23 ENCOUNTER FOR IMMUNIZATION: Primary | ICD-10-CM

## 2021-01-23 PROCEDURE — 91301 SARS-COV-2 / COVID-19 MRNA VACCINE (MODERNA) 100 MCG: CPT

## 2021-01-23 PROCEDURE — 0011A SARS-COV-2 / COVID-19 MRNA VACCINE (MODERNA) 100 MCG: CPT

## 2021-02-11 ENCOUNTER — TELEPHONE (OUTPATIENT)
Dept: HEMATOLOGY ONCOLOGY | Facility: MEDICAL CENTER | Age: 61
End: 2021-02-11

## 2021-02-11 NOTE — TELEPHONE ENCOUNTER
LVM for patient requesting that she complete lab work prior to her appointment tomorrow afternoon with DARRELL Guillen  Provided office phone number for a call back if she has any questions or concerns

## 2021-02-12 ENCOUNTER — OFFICE VISIT (OUTPATIENT)
Dept: HEMATOLOGY ONCOLOGY | Facility: CLINIC | Age: 61
End: 2021-02-12
Payer: COMMERCIAL

## 2021-02-12 VITALS
RESPIRATION RATE: 16 BRPM | HEART RATE: 65 BPM | BODY MASS INDEX: 30.05 KG/M2 | SYSTOLIC BLOOD PRESSURE: 144 MMHG | DIASTOLIC BLOOD PRESSURE: 96 MMHG | OXYGEN SATURATION: 96 % | WEIGHT: 176 LBS | HEIGHT: 64 IN | TEMPERATURE: 97.7 F

## 2021-02-12 DIAGNOSIS — R10.13 DYSPEPSIA: ICD-10-CM

## 2021-02-12 DIAGNOSIS — D70.9 NEUTROPENIA, UNSPECIFIED TYPE (HCC): Primary | ICD-10-CM

## 2021-02-12 PROCEDURE — 99214 OFFICE O/P EST MOD 30 MIN: CPT | Performed by: PHYSICIAN ASSISTANT

## 2021-02-12 NOTE — PROGRESS NOTES
[address] [facility] [id number]  Hematology Ambulatory Follow-Up  Yahir Hamilton, 1960, [id number]  2/12/2021    Assessment/Plan:  1  Neutropenia, unspecified type [facility])  This is a 79-year-old female with history of neutropenia dating back to 20/12  Patient is asymptomatic without signs and symptoms of underlying leukemia/lymphoma  Every discussed these with the patient again today  Patient understands to contact the office with any questions or concerns  Unfortunately, patient did not blood work prior to the appointment  Patient will follow-up with blood work either today or tomorrow  Patient was advised to contact their office to review the blood work over the phone   (blood work to be completed at [address] )    - CBC and differential; Future  - CBC and differential    2  Concerns regarding COVID-19  Patient has some concerns with COVID-19 with 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  I discussed with the patient that as long as she is masked and has glasses covering her eyes, her risk of transition is very low  Patient has also received her 1st vaccination  She will be completely vaccinated in approximately two weeks  3  Dyspepsia  Reinforced GERD diet  Patient was also urged to continue to take pantoprazole for which patient has not been on due to concern for side effects  The patient is scheduled for follow-up in approximately 6 months  Patient voiced agreement and understanding to the above  Patient knows to call the Hematology/Oncology office with any questions and concerns regarding the above      Barrier(s) to care: None  The patient is able to self care   ------------------------------------------------------------------------------------------------------    Chief Complaint   Patient presents with    Follow-up     Neutropenia       History of present illness: This is a 42-year-old female with past medical history chronic asthma,  chronic allergies taking Allegra once a day secondary to generalized pruritus/hisatime reaction, hypertension, Hashimoto's thyroiditis on Synthroid status post partial thyroidectomy, hiatal hernia and GERD controlled by dietary adjustments who was referred to [address] [place] [address] hematology from her endocrinologist for evaluation of mild thrombocytopenia (Dr Rosanne Aguilar)         Patient notes that she has had neutropenia and leukopenia for years  Brian Balderas states that she believes she was evaluated approximately 10 years ago secondary for similar issue   At that time, her blood counts were cyclical and her neutropenia would resolve spontaneously  Argenis Moraes white blood cell count has never been very robust in normally ranges around 4000 per unit L neutropenia is also regularly below 2000 per unit L       patient was referred to come back as patient's counts had stopped cycling and were consistently in the abnormal low range        Patient denies having bone marrow biopsy completed previously  Brian Balderas does admit to need for B12 injections as a child, unsure of situation surrounding this        Patient denies profound fatigue however does admit to some mild fatigue compared to the time last year secondary to COVID-19, chronic anxiety and depression related to the pandemic   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    Patient notes that she has hot flashes but denies drenching night sweats   Patient has an average of 2 infections a year  Indra Lindquist states that compared to 10 years ago she would have may be 1 but denies issues with recurring fevers of unknown origin and states that sinus infections completely resolved with the administration of antibiotics   Interestingly,  She denies fever with presentation of sinus infection ( likely viral )      laboratory results:   10/29/12 WBC = 4 0, hemoglobin = 14 6, platelet count = 253, ANC = 1976   6/19/19 WBC = 3 3, hemoglobin = 13 4, platelet count = 298, ANC = 1490   12/7/19 WBC = 3 8, hemoglobin = 13 4, platelet count = 080, ANC = 1486   6/12/20 WBC = 3 4, hemoglobin = 13 3, platelet count = 651, ANC = 1289     Last colonoscopy/Endoscopy: 8/2019  Mammo and Pap : UTD within th elast year with Transvaginal u/s     7/18/2020: Folate = 8 7, MMA = 117, copper = 91, comprehensive metabolic panel = WNL- no abnormal tests, total white blood cell count = 3 4, hemoglobin = 13 4, hematocrit = 41 2, platelet count = 997, ANC = 13 40, CRP and sed rates within normal limits, HIV and hepatitis screen including AB and C all negative, flow cytometry= within normal limits    Interval history:  No major changes  Patient did not have follow-up with blood work  Patient is asymptomatic no night sweats, unplanned weight loss, fevers or recent infection over the six months  Review of Systems   Constitutional: Positive for fatigue  Negative for appetite change, fever and unexpected weight change  HENT: Negative for nosebleeds  Respiratory: Negative for cough, choking and shortness of breath  Negative hemoptysis  Cardiovascular: Negative for chest pain, palpitations and leg swelling  Gastrointestinal: Negative  Negative for abdominal distention, abdominal pain, anal bleeding, blood in stool, constipation, diarrhea, nausea and vomiting  Endocrine: Negative  Negative for cold intolerance  Genitourinary: Negative  Negative for hematuria, menstrual problem, vaginal bleeding, vaginal discharge and vaginal pain  Musculoskeletal: Negative  Negative for arthralgias, myalgias, neck pain and neck stiffness  Skin: Negative  Negative for color change, pallor and rash  Allergic/Immunologic: Negative  Negative for immunocompromised state  Neurological: Negative  Negative for weakness and headaches  Hematological: Negative for adenopathy  Does not bruise/bleed easily     All other systems reviewed and are negative        Patient Active Problem List   Diagnosis    Asthma    Leiomyoma of uterus    Chronic pain of left knee    Primary osteoarthritis of left knee    Prolapse of anterior vaginal wall    Stress incontinence in female       Past Medical History:   Diagnosis Date    Allergic rhinitis     Asthma     Disease of thyroid gland     Hashimoto's    Fibroid     Hypertension     Hypothyroidism     Primary osteoarthritis of knee     Left - last assessed: Aug 23, 2017    Seasonal allergies     Varicella        Past Surgical History:   Procedure Laterality Date    APPENDECTOMY      COLONOSCOPY  06/30/2014    DIAGNOSTIC LAPAROSCOPY      HAND SURGERY Left     MAMMO (HISTORICAL)  11/26/2016    THYROIDECTOMY  1998    partial       Family History   Problem Relation Age of Onset    Hypertension Mother     Hyperlipidemia Mother     Skin cancer Mother     Stroke Mother     Diabetes Father     Heart attack Father     Stroke Father     Skin cancer Father     Breast cancer Maternal Aunt         > 48    Colon cancer Maternal Grandmother         >50    No Known Problems Daughter     No Known Problems Maternal Aunt     No Known Problems Maternal Aunt     Hyperlipidemia Family     Hypertension Family     Heart disease Family        Social History     Socioeconomic History    Marital status:      Spouse name: None    Number of children: None    Years of education: None    Highest education level: None   Occupational History    None   Social Needs    Financial resource strain: None    Food insecurity     Worry: None     Inability: None    Transportation needs     Medical: None     Non-medical: None   Tobacco Use    Smoking status: Never Smoker    Smokeless tobacco: Never Used   Substance and Sexual Activity    Alcohol use: Yes     Comment: Alcohol: Negative - As per [employer]    Drug use: No    Sexual activity: Not Currently     Birth control/protection: Post-menopausal   Lifestyle    Physical activity     Days per week: None     Minutes per session: None    Stress: None   Relationships    Social connections     Talks on phone: None     Gets together: None     Attends Voodoo service: None     Active member of club or organization: None     Attends meetings of clubs or organizations: None     Relationship status: None    Intimate partner violence     Fear of current or ex partner: None     Emotionally abused: None     Physically abused: None     Forced sexual activity: None   Other Topics Concern    None   Social History Narrative    Marital status:  - As per [employer]         Current Outpatient Medications:     ALPRAZolam (XANAX) 0 25 mg tablet, TAKE 2 TABLETS DAILY FOR ANXIETY, Disp: , Rfl: 2    ASMANEX 30 METERED DOSES 110 MCG/INH AEPB inhaler, Inhale 1 puff daily, Disp: , Rfl: 1    Azelastine HCl 0 15 % SOLN, INSERT 2 SPRAYS 2 TIMES DAILY  , Disp: , Rfl: 0    Brimonidine Tartrate (MIRVASO) 0 33 % GEL, Mirvaso 0 33 % topical gel, Disp: , Rfl:     Cholecalciferol (VITAMIN D-3) 5000 units TABS, , Disp: , Rfl:     diclofenac sodium (Voltaren) 1 %, Voltaren 1 % topical gel  APPLY 2 GRAM TO THE AFFECTED AREA(S) BY TOPICAL ROUTE 4 TIMES PER DAY, Disp: , Rfl:     EPINEPHrine (EPIPEN 2-PHOENIX) 0 3 mg/0 3 mL SOAJ, EpiPen 2-Phoenix 0 3 mg/0 3 mL injection, auto-injector, Disp: , Rfl:     estradiol (ESTRACE VAGINAL) 0 1 mg/g vaginal cream, INSERT ONE GRAM DAILY VAGINALLY AT NIGHT FOR 14 DAYS, THEN TWICE WEEKLY FOR MAINTENANCE , Disp: , Rfl:     Fexofenadine HCl (ALLEGRA PO), take 2 tablet (60MG)  by oral route 2 times every day, Disp: , Rfl:     levothyroxine (SYNTHROID) 137 mcg tablet, Synthroid 137 mcg tablet, Disp: , Rfl:     levothyroxine 125 mcg tablet, Take 125 mcg by mouth daily  125 mcg & 137 mcg alternate days  , Disp: , Rfl:     losartan (COZAAR) 50 mg tablet, , Disp: , Rfl:     mometasone (NASONEX) 50 mcg/act nasal spray, mometasone 50 mcg/actuation nasal spray, Disp: , Rfl:     olopatadine (PATANOL) 0 1 % ophthalmic solution, olopatadine 0 1 % eye drops, Disp: , Rfl:     pantoprazole (PROTONIX) 40 mg tablet, , Disp: , Rfl:     Salicylic Acid 6 % CREA, salicylic acid 6 % topical cream, Disp: , Rfl:     urea (CARMOL) 40 %, urea 40 % topical cream, Disp: , Rfl:     ascorbic acid (VITAMIN C) 500 mg tablet, Vitamin C 500 mg tablet  TAKE 1 TABLET DAILY FOR 50 DAYS, Disp: , Rfl:     Calcium Ascorbate 500 MG TABS, TAKE 1 TABLET DAILY FOR 50 DAYS, Disp: , Rfl:     chlorhexidine (PERIDEX) 0 12 % solution, RINSE 1/2 OUNCE TWICE A DAY FOR 30 SECONDS EXPECTORATE   NO NOT RINSE, Disp: , Rfl: 0    fluticasone (FLONASE) 50 mcg/act nasal spray, fluticasone propionate 50 mcg/actuation nasal spray,suspension, Disp: , Rfl:     labetalol (NORMODYNE) 100 mg tablet, , Disp: , Rfl:     Lidocaine Viscous HCl (XYLOCAINE) 2 % mucosal solution, USE 5 ML BY MOUTH 4 TIMES DAILY (BEFORE MEALS AND AT BEDTIME) GARGLE FOR THROAT PAIN , Disp: , Rfl: 0    loratadine (CLARITIN) 10 mg tablet, TAKE 1T ABLET BY MOUTH DAILY  , Disp: , Rfl: 0    ORACEA 40 MG capsule, Take 40 mg by mouth daily, Disp: , Rfl: 3    No Known Allergies    Objective:  /96 (BP Location: Right arm, Patient Position: Sitting, Cuff Size: Adult)   Pulse 65   Temp 97 7 °F (36 5 °C)   Resp 16   Ht 5' 4" (1 626 m)   Wt 79 8 kg (176 lb)   LMP  (LMP Unknown)   SpO2 96%   BMI 30 21 kg/m²    Physical Exam  Constitutional:       General: She is not in acute distress  Appearance: She is well-developed  HENT:      Head: Normocephalic and atraumatic  Eyes:      General: No scleral icterus  Pupils: Pupils are equal, round, and reactive to light  Cardiovascular:      Rate and Rhythm: Normal rate and regular rhythm  Heart sounds: No murmur  Pulmonary:      Effort: Pulmonary effort is normal  No respiratory distress  Skin:     General: Skin is warm  Coloration: Skin is not pale  Findings: No rash  Neurological:      Mental Status: She is alert and oriented to person, place, and time  Psychiatric:         Thought Content: Thought content normal          Result Review    Please note: This report has been generated by a voice recognition software system  Therefore there may be syntax, spelling, and/or grammatical errors  Please call if you have any questions

## 2021-02-15 PROBLEM — D70.9 NEUTROPENIA, UNSPECIFIED (HCC): Status: ACTIVE | Noted: 2021-02-15

## 2021-02-19 DIAGNOSIS — I10 ESSENTIAL HYPERTENSION: Primary | ICD-10-CM

## 2021-02-19 RX ORDER — LOSARTAN POTASSIUM 50 MG/1
TABLET ORAL
Qty: 90 TABLET | Refills: 3 | Status: SHIPPED | OUTPATIENT
Start: 2021-02-19 | End: 2022-02-16

## 2021-02-22 ENCOUNTER — IMMUNIZATIONS (OUTPATIENT)
Dept: FAMILY MEDICINE CLINIC | Facility: HOSPITAL | Age: 61
End: 2021-02-22

## 2021-02-22 DIAGNOSIS — Z23 ENCOUNTER FOR IMMUNIZATION: Primary | ICD-10-CM

## 2021-02-22 PROCEDURE — 0012A SARS-COV-2 / COVID-19 MRNA VACCINE (MODERNA) 100 MCG: CPT

## 2021-02-22 PROCEDURE — 91301 SARS-COV-2 / COVID-19 MRNA VACCINE (MODERNA) 100 MCG: CPT

## 2021-02-28 LAB
BASOPHILS # BLD AUTO: 50 CELLS/UL (ref 0–200)
BASOPHILS NFR BLD AUTO: 1.6 %
EOSINOPHIL # BLD AUTO: 167 CELLS/UL (ref 15–500)
EOSINOPHIL NFR BLD AUTO: 5.4 %
ERYTHROCYTE [DISTWIDTH] IN BLOOD BY AUTOMATED COUNT: 12.4 % (ref 11–15)
HCT VFR BLD AUTO: 41.6 % (ref 35–45)
HGB BLD-MCNC: 13.6 G/DL (ref 11.7–15.5)
LYMPHOCYTES # BLD AUTO: 1690 CELLS/UL (ref 850–3900)
LYMPHOCYTES NFR BLD AUTO: 54.5 %
MCH RBC QN AUTO: 29.8 PG (ref 27–33)
MCHC RBC AUTO-ENTMCNC: 32.7 G/DL (ref 32–36)
MCV RBC AUTO: 91.2 FL (ref 80–100)
MONOCYTES # BLD AUTO: 369 CELLS/UL (ref 200–950)
MONOCYTES NFR BLD AUTO: 11.9 %
NEUTROPHILS # BLD AUTO: 825 CELLS/UL (ref 1500–7800)
NEUTROPHILS NFR BLD AUTO: 26.6 %
PLATELET # BLD AUTO: 242 THOUSAND/UL (ref 140–400)
PMV BLD REES-ECKER: 11.5 FL (ref 7.5–12.5)
RBC # BLD AUTO: 4.56 MILLION/UL (ref 3.8–5.1)
WBC # BLD AUTO: 3.1 THOUSAND/UL (ref 3.8–10.8)

## 2021-03-03 ENCOUNTER — TELEPHONE (OUTPATIENT)
Dept: HEMATOLOGY ONCOLOGY | Facility: CLINIC | Age: 61
End: 2021-03-03

## 2021-03-03 NOTE — TELEPHONE ENCOUNTER
Patient would like their lab results     Person calling in  Patient   Lab Draw Date 02/27   Lab Eliesre    Call Back Number [phone]

## 2021-03-03 NOTE — TELEPHONE ENCOUNTER
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

## 2021-03-03 NOTE — TELEPHONE ENCOUNTER
Reviewed labs with Dr Ophelia Moreno suggested that Promise Mcpherson should have a cbc in one month  Reviewed neutropenic precautions with Promise Mcpherson  Discussed that she should avoid people who are sick and call her PCP if she becomes sick  Promise Mcpherson is aware and states she will do so

## 2021-03-04 ENCOUNTER — RA CDI HCC (OUTPATIENT)
Dept: OTHER | Facility: HOSPITAL | Age: 61
End: 2021-03-04

## 2021-03-12 ENCOUNTER — TELEPHONE (OUTPATIENT)
Dept: OBGYN CLINIC | Facility: HOSPITAL | Age: 61
End: 2021-03-12

## 2021-03-12 ENCOUNTER — OFFICE VISIT (OUTPATIENT)
Dept: INTERNAL MEDICINE CLINIC | Facility: CLINIC | Age: 61
End: 2021-03-12
Payer: COMMERCIAL

## 2021-03-12 VITALS
HEIGHT: 64 IN | DIASTOLIC BLOOD PRESSURE: 87 MMHG | OXYGEN SATURATION: 96 % | BODY MASS INDEX: 29.53 KG/M2 | WEIGHT: 173 LBS | TEMPERATURE: 98.9 F | SYSTOLIC BLOOD PRESSURE: 124 MMHG | HEART RATE: 64 BPM

## 2021-03-12 DIAGNOSIS — R10.13 EPIGASTRIC PAIN: Primary | ICD-10-CM

## 2021-03-12 DIAGNOSIS — J45.20 MILD INTERMITTENT ASTHMA, UNSPECIFIED WHETHER COMPLICATED: ICD-10-CM

## 2021-03-12 DIAGNOSIS — D70.9 NEUTROPENIA, UNSPECIFIED TYPE (HCC): ICD-10-CM

## 2021-03-12 DIAGNOSIS — Z12.11 SCREENING FOR MALIGNANT NEOPLASM OF COLON: ICD-10-CM

## 2021-03-12 DIAGNOSIS — I10 ESSENTIAL HYPERTENSION: ICD-10-CM

## 2021-03-12 DIAGNOSIS — Z11.59 NEED FOR HEPATITIS C SCREENING TEST: ICD-10-CM

## 2021-03-12 DIAGNOSIS — K21.9 GASTROESOPHAGEAL REFLUX DISEASE WITHOUT ESOPHAGITIS: ICD-10-CM

## 2021-03-12 PROBLEM — M17.12 PRIMARY OSTEOARTHRITIS OF LEFT KNEE: Status: RESOLVED | Noted: 2018-07-17 | Resolved: 2021-03-12

## 2021-03-12 PROCEDURE — 3008F BODY MASS INDEX DOCD: CPT | Performed by: INTERNAL MEDICINE

## 2021-03-12 PROCEDURE — 99214 OFFICE O/P EST MOD 30 MIN: CPT | Performed by: INTERNAL MEDICINE

## 2021-03-12 PROCEDURE — 1036F TOBACCO NON-USER: CPT | Performed by: INTERNAL MEDICINE

## 2021-03-12 PROCEDURE — 3725F SCREEN DEPRESSION PERFORMED: CPT | Performed by: INTERNAL MEDICINE

## 2021-03-12 NOTE — PROGRESS NOTES
Assessment/Plan:    No problem-specific Assessment & Plan notes found for this encounter  Diagnoses and all orders for this visit:    Epigastric pain  -     US liver; Future    Need for hepatitis C screening test  -     Hepatitis C antibody; Future    Screening for malignant neoplasm of colon  -     Ambulatory referral for colonoscopy; Future    Mild intermittent asthma, unspecified whether complicated    Essential hypertension          Subjective:      Patient ID: Nicci Jimenez is a 63 y o  female  Follow  Up  Hypertension ,  Leukopenia,  GERD  With  Hiatal  hernia      The following portions of the patient's history were reviewed and updated as appropriate: allergies, current medications, past family history, past medical history, past social history, past surgical history, and problem list     Review of Systems   Constitutional: Negative  HENT: Negative for dental problem, drooling, ear discharge and ear pain  Eyes: Negative for discharge, redness and itching  Respiratory: Negative for apnea, cough and wheezing  Cardiovascular: Negative for chest pain and palpitations  Gastrointestinal: Negative for abdominal pain, blood in stool, diarrhea and vomiting  Endocrine: Negative for polydipsia, polyphagia and polyuria  Genitourinary: Negative for decreased urine volume, dysuria and frequency  Musculoskeletal: Negative for arthralgias, myalgias and neck stiffness  Skin: Negative for pallor and wound  Allergic/Immunologic: Negative for environmental allergies and food allergies  Neurological: Negative for facial asymmetry, light-headedness, numbness and headaches  Hematological: Negative for adenopathy  Does not bruise/bleed easily  Psychiatric/Behavioral: Negative for agitation, behavioral problems and confusion           Objective:      /87   Pulse 64   Temp 98 9 °F (37 2 °C) (Temporal)   Ht 5' 4" (1 626 m)   Wt 78 5 kg (173 lb)   LMP  (LMP Unknown)   SpO2 96%   BMI 29 70 kg/m²          Physical Exam  Constitutional:       Appearance: Normal appearance  She is obese  HENT:      Head: Normocephalic  Nose: Nose normal       Mouth/Throat:      Mouth: Mucous membranes are moist    Eyes:      Pupils: Pupils are equal, round, and reactive to light  Neck:      Musculoskeletal: Neck supple  Cardiovascular:      Rate and Rhythm: Regular rhythm  Heart sounds: Normal heart sounds  Pulmonary:      Breath sounds: Normal breath sounds  Abdominal:      Palpations: Abdomen is soft  Musculoskeletal:         General: No swelling  Skin:     General: Skin is warm  Neurological:      General: No focal deficit present  Mental Status: She is alert and oriented to person, place, and time     Psychiatric:         Mood and Affect: Mood normal        Neutropenia     stabel    GERD  With  Hiatal  Hernia   PPI   Prn    Hypertenison  Controlled  With   [facility] med

## 2021-03-12 NOTE — TELEPHONE ENCOUNTER
S/w patient who advised she would wait around summer time to get the injections if she needed them    Thanks

## 2021-03-18 ENCOUNTER — TELEPHONE (OUTPATIENT)
Dept: ADMINISTRATIVE | Facility: OTHER | Age: 61
End: 2021-03-18

## 2021-03-18 NOTE — LETTER
Procedure Request Form: Colonoscopy      Date Requested: 21  Patient: Mone Werner  Patient : [birth year]   Referring Provider: Brittanie Reina, MD        Date of Procedure ______________________________       The above patient has informed us that they have completed their   most recent Colonoscopy at your facility  Please complete   this form and attach all corresponding procedure reports/results  Comments __________________________________________________________  ____________________________________________________________________  ____________________________________________________________________  ____________________________________________________________________    Facility Completing Procedure _________________________________________    Form Completed By (print name) _______________________________________      Signature __________________________________________________________      These reports are needed for  compliance    Please fax this completed form and a copy of the procedure report to our office located at [place] [address] as soon as possible to [phone] wilbur Medel: Phone [phone]    We thank you for your assistance in treating our mutual patient

## 2021-03-18 NOTE — TELEPHONE ENCOUNTER
Upon review of the In Basket request and the patient's chart, initial outreach has been made via fax, please see Contacts section for details       Thank you  Brigida Bedoya

## 2021-03-18 NOTE — TELEPHONE ENCOUNTER
----- Message from Marilou Pizarro sent at 3/17/2021  4:46 PM EDT -----  Regarding: crc  03/17/21 4:47 PM    Hello, our patient Cb Ochoa has had CRC: Colonoscopy completed/performed  Please assist in updating the patient chart by making an External outreach to Dr Adriana Munoz at [facility] located in [facility]  The date of service is 8/2019      Thank you,  Mario Earl [facility] INTERNAL MED

## 2021-03-22 NOTE — TELEPHONE ENCOUNTER
Upon review of the In Basket request we were able to locate, review, and update the patient chart as requested for CRC: Colonoscopy  Any additional questions or concerns should be emailed to the Practice Liaisons via [email or web address] email, please do not reply via In Basket      Thank you  Matilda Sharp

## 2021-03-23 ENCOUNTER — TRANSCRIBE ORDERS (OUTPATIENT)
Dept: ADMINISTRATIVE | Facility: HOSPITAL | Age: 61
End: 2021-03-23

## 2021-03-23 DIAGNOSIS — R10.11 ABDOMINAL PAIN, RIGHT UPPER QUADRANT: Primary | ICD-10-CM

## 2021-03-27 ENCOUNTER — HOSPITAL ENCOUNTER (OUTPATIENT)
Dept: ULTRASOUND IMAGING | Facility: HOSPITAL | Age: 61
Discharge: HOME | End: 2021-03-27
Attending: SURGERY
Payer: COMMERCIAL

## 2021-03-27 DIAGNOSIS — R10.11 ABDOMINAL PAIN, RIGHT UPPER QUADRANT: ICD-10-CM

## 2021-03-27 PROCEDURE — 76705 ECHO EXAM OF ABDOMEN: CPT

## 2021-03-29 ENCOUNTER — TELEPHONE (OUTPATIENT)
Dept: INTERNAL MEDICINE CLINIC | Facility: CLINIC | Age: 61
End: 2021-03-29

## 2021-03-29 ENCOUNTER — APPOINTMENT (OUTPATIENT)
Dept: LAB | Facility: CLINIC | Age: 61
End: 2021-03-29
Payer: COMMERCIAL

## 2021-03-29 ENCOUNTER — TRANSCRIBE ORDERS (OUTPATIENT)
Dept: LAB | Facility: CLINIC | Age: 61
End: 2021-03-29

## 2021-03-29 DIAGNOSIS — Z11.52 ENCOUNTER FOR SCREENING FOR COVID-19: Primary | ICD-10-CM

## 2021-03-29 DIAGNOSIS — Z11.52 ENCOUNTER FOR SCREENING FOR COVID-19: ICD-10-CM

## 2021-03-29 DIAGNOSIS — R10.11 ABDOMINAL PAIN, RIGHT UPPER QUADRANT: Primary | ICD-10-CM

## 2021-03-29 LAB
BILIRUB UR QL STRIP: NEGATIVE
CLARITY UR: CLEAR
COLOR UR: YELLOW
GLUCOSE UR STRIP-MCNC: NEGATIVE MG/DL
HGB UR QL STRIP.AUTO: NEGATIVE
KETONES UR STRIP-MCNC: NEGATIVE MG/DL
LEUKOCYTE ESTERASE UR QL STRIP: NEGATIVE
NITRITE UR QL STRIP: NEGATIVE
PH UR STRIP.AUTO: 5.5 [PH]
PROT UR STRIP-MCNC: NEGATIVE MG/DL
SP GR UR STRIP.AUTO: <=1.005 (ref 1–1.03)
UROBILINOGEN UR QL STRIP.AUTO: 0.2 E.U./DL

## 2021-03-29 PROCEDURE — U0005 INFEC AGEN DETEC AMPLI PROBE: HCPCS | Performed by: INTERNAL MEDICINE

## 2021-03-29 PROCEDURE — U0003 INFECTIOUS AGENT DETECTION BY NUCLEIC ACID (DNA OR RNA); SEVERE ACUTE RESPIRATORY SYNDROME CORONAVIRUS 2 (SARS-COV-2) (CORONAVIRUS DISEASE [COVID-19]), AMPLIFIED PROBE TECHNIQUE, MAKING USE OF HIGH THROUGHPUT TECHNOLOGIES AS DESCRIBED BY CMS-2020-01-R: HCPCS | Performed by: INTERNAL MEDICINE

## 2021-03-29 PROCEDURE — 81003 URINALYSIS AUTO W/O SCOPE: CPT | Performed by: SURGERY

## 2021-03-29 NOTE — TELEPHONE ENCOUNTER
PT called stating her Daughter tested positive for covid today and she is required to get tested to go to work

## 2021-03-30 LAB — SARS-COV-2 RNA RESP QL NAA+PROBE: NEGATIVE

## 2021-04-01 ENCOUNTER — TRANSCRIBE ORDERS (OUTPATIENT)
Dept: ADMINISTRATIVE | Facility: HOSPITAL | Age: 61
End: 2021-04-01

## 2021-04-01 DIAGNOSIS — R10.11 RIGHT UPPER QUADRANT PAIN: Primary | ICD-10-CM

## 2021-04-19 ENCOUNTER — HOSPITAL ENCOUNTER (OUTPATIENT)
Dept: NUCLEAR MEDICINE | Facility: HOSPITAL | Age: 61
Discharge: HOME | End: 2021-04-19
Attending: SURGERY
Payer: COMMERCIAL

## 2021-04-19 DIAGNOSIS — R10.11 RIGHT UPPER QUADRANT PAIN: ICD-10-CM

## 2021-04-19 PROCEDURE — 78227 HEPATOBIL SYST IMAGE W/DRUG: CPT

## 2021-04-19 PROCEDURE — A9537 TC99M MEBROFENIN: HCPCS

## 2021-04-19 PROCEDURE — G1004 CDSM NDSC: HCPCS

## 2021-04-19 RX ADMIN — SINCALIDE 1.6 MCG: 5 INJECTION, POWDER, LYOPHILIZED, FOR SOLUTION INTRAVENOUS at 09:00

## 2021-04-20 LAB
HCV AB S/CO SERPL IA: 0.01
HCV AB SERPL QL IA: NORMAL

## 2021-06-14 ENCOUNTER — ANNUAL EXAM (OUTPATIENT)
Dept: OBGYN CLINIC | Facility: CLINIC | Age: 61
End: 2021-06-14
Payer: COMMERCIAL

## 2021-06-14 ENCOUNTER — OFFICE VISIT (OUTPATIENT)
Dept: INTERNAL MEDICINE CLINIC | Facility: CLINIC | Age: 61
End: 2021-06-14
Payer: COMMERCIAL

## 2021-06-14 VITALS
SYSTOLIC BLOOD PRESSURE: 128 MMHG | HEART RATE: 78 BPM | WEIGHT: 173 LBS | BODY MASS INDEX: 29.53 KG/M2 | DIASTOLIC BLOOD PRESSURE: 84 MMHG | HEIGHT: 64 IN

## 2021-06-14 VITALS
WEIGHT: 169 LBS | TEMPERATURE: 98.7 F | HEIGHT: 64 IN | OXYGEN SATURATION: 97 % | HEART RATE: 63 BPM | BODY MASS INDEX: 28.85 KG/M2

## 2021-06-14 DIAGNOSIS — N81.10 PROLAPSE OF ANTERIOR VAGINAL WALL: ICD-10-CM

## 2021-06-14 DIAGNOSIS — Z01.818 PREOP GENERAL PHYSICAL EXAM: Primary | ICD-10-CM

## 2021-06-14 DIAGNOSIS — Z01.419 ENCOUNTER FOR GYNECOLOGICAL EXAMINATION WITHOUT ABNORMAL FINDING: Primary | ICD-10-CM

## 2021-06-14 DIAGNOSIS — Z12.31 ENCOUNTER FOR SCREENING MAMMOGRAM FOR BREAST CANCER: ICD-10-CM

## 2021-06-14 DIAGNOSIS — D70.9 NEUTROPENIA, UNSPECIFIED TYPE (HCC): ICD-10-CM

## 2021-06-14 DIAGNOSIS — J45.20 MILD INTERMITTENT ASTHMA, UNSPECIFIED WHETHER COMPLICATED: ICD-10-CM

## 2021-06-14 PROCEDURE — 1036F TOBACCO NON-USER: CPT | Performed by: INTERNAL MEDICINE

## 2021-06-14 PROCEDURE — S0612 ANNUAL GYNECOLOGICAL EXAMINA: HCPCS | Performed by: NURSE PRACTITIONER

## 2021-06-14 PROCEDURE — 99243 OFF/OP CNSLTJ NEW/EST LOW 30: CPT | Performed by: INTERNAL MEDICINE

## 2021-06-14 PROCEDURE — 3008F BODY MASS INDEX DOCD: CPT | Performed by: INTERNAL MEDICINE

## 2021-06-14 NOTE — PROGRESS NOTES
Assessment/Pl  Preop  Clearance for  surgery   Diagnoses and all orders for this visit:    Preop general physical exam    Mild intermittent asthma, unspecified whether complicated    Neutropenia, unspecified type (HCC)          Subjective:      Patient ID: Zainab Ge is a 63 y o  female  Office  Visit  For  preop  Cardiac  Clearance   The following portions of the patient's history were reviewed and updated as appropriate: allergies, current medications, past family history, past medical history, past social history, past surgical history, and problem list     Review of Systems   Constitutional: Negative  HENT: Negative for dental problem, drooling, ear discharge and ear pain  Eyes: Negative for discharge, redness and itching  Respiratory: Negative for apnea, cough and wheezing  Cardiovascular: Negative for chest pain and palpitations  Gastrointestinal: Negative for abdominal pain, blood in stool, diarrhea and vomiting  Endocrine: Negative for polydipsia, polyphagia and polyuria  Genitourinary: Negative for decreased urine volume, dysuria and frequency  Musculoskeletal: Negative for arthralgias, myalgias and neck stiffness  Skin: Negative for pallor and wound  Allergic/Immunologic: Negative for environmental allergies and food allergies  Neurological: Negative for facial asymmetry, light-headedness, numbness and headaches  Hematological: Negative for adenopathy  Does not bruise/bleed easily  Psychiatric/Behavioral: Negative for agitation, behavioral problems and confusion  Objective:      Pulse 63   Temp 98 7 °F (37 1 °C) (Temporal)   Ht 5' 4" (1 626 m)   Wt 76 7 kg (169 lb)   LMP  (LMP Unknown)   SpO2 97%   BMI 29 01 kg/m²          Physical Exam  Constitutional:       Appearance: Normal appearance  She is obese  HENT:      Head: Normocephalic        Nose: Nose normal       Mouth/Throat:      Mouth: Mucous membranes are moist    Eyes:      Pupils: Pupils are equal, round, and reactive to light  Cardiovascular:      Rate and Rhythm: Regular rhythm  Heart sounds: Normal heart sounds  Pulmonary:      Breath sounds: Normal breath sounds  Abdominal:      Palpations: Abdomen is soft  Musculoskeletal:         General: No swelling  Cervical back: Neck supple  Skin:     General: Skin is warm  Neurological:      General: No focal deficit present  Mental Status: She is alert and oriented to person, place, and time  Psychiatric:         Mood and Affect: Mood normal        Patient  Is  A low risk  For  A  Low risk surgery of  Pelvic  Reconstruction   Cleared  For  The  Procedure  EKG  Shows sinuses bradycardia   Fup   6 months      SCOTTIE Pate MD

## 2021-06-14 NOTE — PROGRESS NOTES
Assessment / Plan     1  Encounter for gynecological examination without abnormal finding  Well woman exam  2019 pap/hpv negative  Repeat   Up to date on colonoscopy    2  Encounter for screening mammogram for breast cancer  Has order and appt for update        Subjective      Serena Rodriguez is a 63 y o  female who presents for her annual gynecologic exam     64 yo  presents for yearly update  2019 pap/hpv negative  2020 mammo benign  colonoscopy; 2019    Using vaginal estrogen cream  booked for repair of prolapse w/ Dr Elizabeth Black from [facility] on 21    Periods are absent  Current contraception: post menopausal status  History of abnormal Pap smear: no  Family history of breast,uterine, ovarian or colon cancer: yes - MGM colon ca, mat aunt breast ca    Menstrual History:  OB History        2    Para   2    Term   2            AB        Living           SAB        TAB        Ectopic        Multiple        Live Births               Obstetric Comments   Menarche 11           No LMP recorded (lmp unknown)  Patient is postmenopausal        The following portions of the patient's history were reviewed and updated as appropriate: allergies, current medications, past family history, past medical history, past social history, past surgical history and problem list     Review of Systems      Review of Systems   Constitutional: Negative for chills and fever  Respiratory: Negative for cough and shortness of breath  Gastrointestinal: Negative for abdominal distention, abdominal pain, blood in stool, constipation, diarrhea, nausea and vomiting  Genitourinary: Negative for difficulty urinating, dysuria, frequency, genital sores, hematuria, menstrual problem, pelvic pain, urgency, vaginal bleeding and vaginal discharge  Stress UI   Musculoskeletal: Negative for arthralgias and myalgias       Breasts:  Negative for skin changes, dimpling, asymmetry, nipple discharge, redness, tenderness or palpable masses      Objective      /84   Pulse 78   Ht 5' 4" (1 626 m)   Wt 78 5 kg (173 lb)   LMP  (LMP Unknown)   BMI 29 70 kg/m²   Physical Exam  Constitutional:       General: She is not in acute distress  Appearance: Normal appearance  She is well-developed  She is not ill-appearing or diaphoretic  Comments: BMI 29 7   HENT:      Head: Normocephalic and atraumatic  Eyes:      Pupils: Pupils are equal, round, and reactive to light  Neck:      Thyroid: No thyromegaly  Pulmonary:      Effort: Pulmonary effort is normal    Chest:      Breasts: Breasts are symmetrical          Right: No inverted nipple, mass, nipple discharge, skin change or tenderness  Left: No inverted nipple, mass, nipple discharge, skin change or tenderness  Abdominal:      General: There is no distension  Palpations: Abdomen is soft  There is no mass  Tenderness: There is no abdominal tenderness  There is no guarding or rebound  Genitourinary:     General: Normal vulva  Exam position: Lithotomy position  Labia:         Right: No rash, tenderness, lesion or injury  Left: No rash, tenderness, lesion or injury  Vagina: No signs of injury and foreign body  No vaginal discharge, erythema, tenderness or bleeding  Cervix: No cervical motion tenderness, discharge or friability  Uterus: Not enlarged and not tender  Adnexa:         Right: No mass, tenderness or fullness  Left: No mass, tenderness or fullness  Rectum: Normal    Musculoskeletal:      Cervical back: Neck supple  Lymphadenopathy:      Cervical: No cervical adenopathy  Upper Body:      Right upper body: No supraclavicular adenopathy  Left upper body: No supraclavicular adenopathy  Skin:     General: Skin is warm and dry  Neurological:      General: No focal deficit present  Mental Status: She is alert and oriented to person, place, and time  She is not disoriented  Psychiatric:         Behavior: Behavior normal          Thought Content:  Thought content normal          Judgment: Judgment normal

## 2021-06-16 ENCOUNTER — TELEPHONE (OUTPATIENT)
Dept: HEMATOLOGY ONCOLOGY | Facility: CLINIC | Age: 61
End: 2021-06-16

## 2021-06-16 NOTE — TELEPHONE ENCOUNTER
Appointment Confirmation     Appointment with  Vijay Garcia    Appointment date & time 7/16/ 9am    Location [facility]   Patient verbilized Understanding  yes

## 2021-07-08 ENCOUNTER — TELEPHONE (OUTPATIENT)
Dept: OBGYN CLINIC | Facility: HOSPITAL | Age: 61
End: 2021-07-08

## 2021-07-08 NOTE — TELEPHONE ENCOUNTER
Please advise if the following patient can be forced onto the schedule:  Patient:  Jose Carlos Calloway  :   1960  MRN:  :   [id number]  Call back:  [phone]  Reason for appointment  Patient had her first [facility] appt scheduled for today but had to cancel due to a migraine  Dr Edgar Salvador schedule is full on  to add the 3rd one   (she stated anytime works for her)  Requested doctor/location:  [facility]    Emailed practice admin

## 2021-07-12 ENCOUNTER — APPOINTMENT (EMERGENCY)
Dept: CT IMAGING | Facility: HOSPITAL | Age: 61
End: 2021-07-12
Payer: COMMERCIAL

## 2021-07-12 ENCOUNTER — HOSPITAL ENCOUNTER (EMERGENCY)
Facility: HOSPITAL | Age: 61
Discharge: HOME | End: 2021-07-12
Attending: EMERGENCY MEDICINE | Admitting: EMERGENCY MEDICINE
Payer: COMMERCIAL

## 2021-07-12 VITALS
HEART RATE: 63 BPM | DIASTOLIC BLOOD PRESSURE: 78 MMHG | OXYGEN SATURATION: 98 % | SYSTOLIC BLOOD PRESSURE: 129 MMHG | WEIGHT: 174.16 LBS | TEMPERATURE: 98.2 F | RESPIRATION RATE: 16 BRPM | BODY MASS INDEX: 29.9 KG/M2

## 2021-07-12 DIAGNOSIS — R51.9 HEADACHE: Primary | ICD-10-CM

## 2021-07-12 DIAGNOSIS — R47.89 WORD FINDING DIFFICULTY: ICD-10-CM

## 2021-07-12 LAB
ANION GAP SERPL CALCULATED.3IONS-SCNC: 10 MMOL/L (ref 4–13)
ATRIAL RATE: 72 BPM
BUN SERPL-MCNC: 11 MG/DL (ref 5–25)
CALCIUM SERPL-MCNC: 8.7 MG/DL (ref 8.3–10.1)
CHLORIDE SERPL-SCNC: 106 MMOL/L (ref 100–108)
CO2 SERPL-SCNC: 25 MMOL/L (ref 21–32)
CREAT SERPL-MCNC: 0.67 MG/DL (ref 0.6–1.3)
ERYTHROCYTE [DISTWIDTH] IN BLOOD BY AUTOMATED COUNT: 12.8 % (ref 11.6–15.1)
GFR SERPL CREATININE-BSD FRML MDRD: 95 ML/MIN/1.73SQ M
GLUCOSE SERPL-MCNC: 100 MG/DL (ref 65–140)
GLUCOSE SERPL-MCNC: 98 MG/DL (ref 65–140)
HCT VFR BLD AUTO: 42 % (ref 34.8–46.1)
HGB BLD-MCNC: 13.8 G/DL (ref 11.5–15.4)
INR PPP: 0.96 (ref 0.84–1.19)
MCH RBC QN AUTO: 30 PG (ref 26.8–34.3)
MCHC RBC AUTO-ENTMCNC: 32.9 G/DL (ref 31.4–37.4)
MCV RBC AUTO: 91 FL (ref 82–98)
P AXIS: 55 DEGREES
PLATELET # BLD AUTO: 302 THOUSANDS/UL (ref 149–390)
PMV BLD AUTO: 10.8 FL (ref 8.9–12.7)
POTASSIUM SERPL-SCNC: 3.6 MMOL/L (ref 3.5–5.3)
PR INTERVAL: 164 MS
PROTHROMBIN TIME: 12.8 SECONDS (ref 11.6–14.5)
QRS AXIS: 77 DEGREES
QRSD INTERVAL: 106 MS
QT INTERVAL: 390 MS
QTC INTERVAL: 427 MS
RBC # BLD AUTO: 4.6 MILLION/UL (ref 3.81–5.12)
SODIUM SERPL-SCNC: 141 MMOL/L (ref 136–145)
T WAVE AXIS: 59 DEGREES
TROPONIN I SERPL-MCNC: <0.02 NG/ML
VENTRICULAR RATE: 72 BPM
WBC # BLD AUTO: 7.57 THOUSAND/UL (ref 4.31–10.16)

## 2021-07-12 PROCEDURE — 70498 CT ANGIOGRAPHY NECK: CPT

## 2021-07-12 PROCEDURE — 96360 HYDRATION IV INFUSION INIT: CPT

## 2021-07-12 PROCEDURE — 85027 COMPLETE CBC AUTOMATED: CPT | Performed by: EMERGENCY MEDICINE

## 2021-07-12 PROCEDURE — 82948 REAGENT STRIP/BLOOD GLUCOSE: CPT

## 2021-07-12 PROCEDURE — 36415 COLL VENOUS BLD VENIPUNCTURE: CPT | Performed by: EMERGENCY MEDICINE

## 2021-07-12 PROCEDURE — 80048 BASIC METABOLIC PNL TOTAL CA: CPT | Performed by: EMERGENCY MEDICINE

## 2021-07-12 PROCEDURE — 93010 ELECTROCARDIOGRAM REPORT: CPT | Performed by: INTERNAL MEDICINE

## 2021-07-12 PROCEDURE — 99285 EMERGENCY DEPT VISIT HI MDM: CPT

## 2021-07-12 PROCEDURE — 85610 PROTHROMBIN TIME: CPT | Performed by: EMERGENCY MEDICINE

## 2021-07-12 PROCEDURE — 70496 CT ANGIOGRAPHY HEAD: CPT

## 2021-07-12 PROCEDURE — 99285 EMERGENCY DEPT VISIT HI MDM: CPT | Performed by: EMERGENCY MEDICINE

## 2021-07-12 PROCEDURE — 93005 ELECTROCARDIOGRAM TRACING: CPT

## 2021-07-12 PROCEDURE — 96361 HYDRATE IV INFUSION ADD-ON: CPT

## 2021-07-12 PROCEDURE — G1004 CDSM NDSC: HCPCS

## 2021-07-12 PROCEDURE — 84484 ASSAY OF TROPONIN QUANT: CPT | Performed by: EMERGENCY MEDICINE

## 2021-07-12 RX ORDER — KETOROLAC TROMETHAMINE 30 MG/ML
15 INJECTION, SOLUTION INTRAMUSCULAR; INTRAVENOUS ONCE
Status: DISCONTINUED | OUTPATIENT
Start: 2021-07-12 | End: 2021-07-12 | Stop reason: HOSPADM

## 2021-07-12 RX ORDER — DIPHENHYDRAMINE HYDROCHLORIDE 50 MG/ML
12.5 INJECTION INTRAMUSCULAR; INTRAVENOUS ONCE
Status: DISCONTINUED | OUTPATIENT
Start: 2021-07-12 | End: 2021-07-12 | Stop reason: HOSPADM

## 2021-07-12 RX ORDER — METOCLOPRAMIDE HYDROCHLORIDE 5 MG/ML
5 INJECTION INTRAMUSCULAR; INTRAVENOUS ONCE
Status: DISCONTINUED | OUTPATIENT
Start: 2021-07-12 | End: 2021-07-12 | Stop reason: HOSPADM

## 2021-07-12 RX ORDER — MAGNESIUM SULFATE HEPTAHYDRATE 40 MG/ML
2 INJECTION, SOLUTION INTRAVENOUS ONCE
Status: DISCONTINUED | OUTPATIENT
Start: 2021-07-12 | End: 2021-07-12 | Stop reason: HOSPADM

## 2021-07-12 RX ADMIN — IOHEXOL 90 ML: 350 INJECTION, SOLUTION INTRAVENOUS at 16:27

## 2021-07-12 RX ADMIN — SODIUM CHLORIDE 1000 ML: 0.9 INJECTION, SOLUTION INTRAVENOUS at 16:34

## 2021-07-12 NOTE — ED PROVIDER NOTES
History  Chief Complaint   Patient presents with    Altered Mental Status     pt states had allergy shot at Dr  office could not find her word before she left and when she left appt and was speaking with her son last known being ok was at 36      HPI     24-year-old female with history of ocular migraines previously diagnosed by an ophthalmologist presenting for evaluation of headache behind her left eye accompanied by visual aura and word-finding difficulty  Patient states that she not had an ocular migraine an approximately 18 years since the birth of her children, however over the last week has had a couple of episodes where she developed a visual aura to the left eye described as sparkling lights followed by onset of a headache to the left side of the head behind the left eye and word-finding difficulty  This last occurred this morning at 11:30 a m  When the patient was getting an allergy shot at her doctor's office  Shortly after the allergy shot, she noticed the visual aura which was followed by a left-sided headache which was gradual in onset behind her left eye word-finding difficulty  She denies slurred speech, facial droop, or numbness or weakness in her arms or legs  She was able to ambulate  The visual aura and word-finding difficulty lasted for about 30 minutes before completely resolving  She now feels at baseline with the exception of her headache  No prior history of stroke or TIA  She recently had a hysterectomy about 1 week ago for pelvic floor weakness  Prior to Admission Medications   Prescriptions Last Dose Informant Patient Reported? Taking? ALPRAZolam (XANAX) 0 25 mg tablet  Self Yes No   Sig: TAKE 2 TABLETS DAILY FOR ANXIETY   ASMANEX 30 METERED DOSES 110 MCG/INH AEPB inhaler  Self Yes No   Sig: Inhale 1 puff daily   Azelastine HCl 0 15 % SOLN  Self Yes No   Sig: INSERT 2 SPRAYS 2 TIMES DAILY     Brimonidine Tartrate (MIRVASO) 0 33 % GEL  Self Yes No   Sig: Mirvaso 0 33 % topical gel   Calcium Ascorbate 500 MG TABS  Self Yes No   Sig: TAKE 1 TABLET DAILY FOR 50 DAYS   Cholecalciferol (VITAMIN D-3) 5000 units TABS  Self Yes No   EPINEPHrine (EPIPEN 2-PHOENIX) 0 3 mg/0 3 mL SOAJ  Self Yes No   Sig: EpiPen 2-Phoenix 0 3 mg/0 3 mL injection, auto-injector   Fexofenadine HCl (ALLEGRA PO)  Self Yes No   Sig: take 2 tablet (60MG)  by oral route 2 times every day   Lidocaine Viscous HCl (XYLOCAINE) 2 % mucosal solution  Self Yes No   Sig: USE 5 ML BY MOUTH 4 TIMES DAILY (BEFORE MEALS AND AT BEDTIME) GARGLE FOR THROAT PAIN  ORACEA 40 MG capsule  Self Yes No   Sig: Take 40 mg by mouth daily   Salicylic Acid 6 % CREA  Self Yes No   Sig: salicylic acid 6 % topical cream   ascorbic acid (VITAMIN C) 500 mg tablet  Self Yes No   Sig: Vitamin C 500 mg tablet   TAKE 1 TABLET DAILY FOR 50 DAYS   chlorhexidine (PERIDEX) 0 12 % solution  Self Yes No   Sig: RINSE 1/2 OUNCE TWICE A DAY FOR 30 SECONDS EXPECTORATE   NO NOT RINSE   diclofenac sodium (Voltaren) 1 %  Self Yes No   Sig: Voltaren 1 % topical gel   APPLY 2 GRAM TO THE AFFECTED AREA(S) BY TOPICAL ROUTE 4 TIMES PER DAY   estradiol (ESTRACE VAGINAL) 0 1 mg/g vaginal cream  Self Yes No   Sig: INSERT ONE GRAM DAILY VAGINALLY AT NIGHT FOR 14 DAYS, THEN TWICE WEEKLY FOR MAINTENANCE    fluticasone (FLONASE) 50 mcg/act nasal spray  Self Yes No   Sig: fluticasone propionate 50 mcg/actuation nasal spray,suspension   labetalol (NORMODYNE) 100 mg tablet  Self Yes No   levothyroxine (SYNTHROID) 137 mcg tablet  Self Yes No   Sig: Synthroid 137 mcg tablet   levothyroxine 125 mcg tablet  Self Yes No   Sig: Take 125 mcg by mouth daily  125 mcg & 137 mcg alternate days  loratadine (CLARITIN) 10 mg tablet  Self Yes No   Sig: TAKE 1T ABLET BY MOUTH DAILY     losartan (COZAAR) 50 mg tablet   No No   Sig: TAKE 1 TABLET DAILY   mometasone (NASONEX) 50 mcg/act nasal spray  Self Yes No   Sig: mometasone 50 mcg/actuation nasal spray   olopatadine (PATANOL) 0 1 % ophthalmic solution  Self Yes No   Sig: olopatadine 0 1 % eye drops   pantoprazole (PROTONIX) 40 mg tablet  Self Yes No   urea (CARMOL) 40 %  Self Yes No   Sig: urea 40 % topical cream      Facility-Administered Medications: None       Past Medical History:   Diagnosis Date    Allergic rhinitis     Asthma     Disease of thyroid gland     Hashimoto's    Fibroid     Hypertension     Hypothyroidism     Primary osteoarthritis of knee     Left - last assessed: Aug 23, 2017    Seasonal allergies     Varicella        Past Surgical History:   Procedure Laterality Date    APPENDECTOMY      COLONOSCOPY  06/30/2014    DIAGNOSTIC LAPAROSCOPY      HAND SURGERY Left     MAMMO (HISTORICAL)  11/26/2016    THYROIDECTOMY  1998    partial       Family History   Problem Relation Age of Onset    Hypertension Mother     Hyperlipidemia Mother     Skin cancer Mother     Stroke Mother     Diabetes Father     Heart attack Father     Stroke Father     Skin cancer Father     Breast cancer Maternal Aunt         > 48    Colon cancer Maternal Grandmother         >50    No Known Problems Daughter     No Known Problems Maternal Aunt     No Known Problems Maternal Aunt     Hyperlipidemia Family     Hypertension Family     Heart disease Family      I have reviewed and agree with the history as documented  E-Cigarette/Vaping    E-Cigarette Use Never User      E-Cigarette/Vaping Substances    Nicotine No     THC No     CBD No     Flavoring No     Other No     Unknown No      Social History     Tobacco Use    Smoking status: Never Smoker    Smokeless tobacco: Never Used   Vaping Use    Vaping Use: Never used   Substance Use Topics    Alcohol use: Yes    Drug use: No       Review of Systems   Constitutional: Negative for chills and fever  HENT: Negative for congestion  Eyes: Positive for visual disturbance (resolved)  Negative for pain          Pain behind the L eye   Respiratory: Negative for cough and shortness of breath  Cardiovascular: Negative for chest pain and leg swelling  Gastrointestinal: Negative for abdominal pain, diarrhea, nausea and vomiting  Genitourinary: Negative for dysuria and frequency  Musculoskeletal: Negative for arthralgias, back pain, neck pain and neck stiffness  Skin: Negative for rash  Neurological: Positive for speech difficulty (word-finding difficulty, resolved) and headaches (L sided)  Negative for weakness and numbness  Psychiatric/Behavioral: Negative for agitation, behavioral problems and confusion  Physical Exam  Physical Exam  Constitutional:       General: She is not in acute distress  Appearance: She is well-developed  She is not diaphoretic  HENT:      Head: Normocephalic and atraumatic  Right Ear: External ear normal       Left Ear: External ear normal       Nose: Nose normal    Eyes:      Extraocular Movements: Extraocular movements intact  Conjunctiva/sclera: Conjunctivae normal       Pupils: Pupils are equal, round, and reactive to light  Comments: Visual fields intact to confrontation  Visual acuity 20 40 bilaterally   Cardiovascular:      Rate and Rhythm: Normal rate and regular rhythm  Pulses: Normal pulses  Heart sounds: Normal heart sounds  No murmur heard  No friction rub  No gallop  Pulmonary:      Effort: Pulmonary effort is normal  No respiratory distress  Breath sounds: Normal breath sounds  No wheezing or rales  Abdominal:      General: Bowel sounds are normal  There is no distension  Palpations: Abdomen is soft  Tenderness: There is no abdominal tenderness  There is no guarding  Musculoskeletal:         General: No deformity  Normal range of motion  Cervical back: Normal range of motion and neck supple  Skin:     General: Skin is warm and dry  Neurological:      Mental Status: She is alert and oriented to person, place, and time  Motor: No abnormal muscle tone        Comments: Face symmetric, tongue midline, 5/5 strength in the proximal and distal upper and lower extremities bilaterally with intact sensation to light touch throughout  CN II-XII intact  Normal finger-to-nose, rapid alternating movements, and heel-to-shin bilaterally  Normal speech, normal gait  No pronator drift        Psychiatric:         Mood and Affect: Mood normal          Vital Signs  ED Triage Vitals   Temperature Pulse Respirations Blood Pressure SpO2   07/12/21 1530 07/12/21 1428 07/12/21 1428 07/12/21 1428 07/12/21 1428   98 2 °F (36 8 °C) 79 16 162/92 97 %      Temp Source Heart Rate Source Patient Position - Orthostatic VS BP Location FiO2 (%)   07/12/21 1530 -- -- -- --   Oral          Pain Score       07/12/21 1635       No Pain           Vitals:    07/12/21 1428 07/12/21 1635 07/12/21 1913   BP: 162/92 157/91 129/78   Pulse: 79 68 63         Visual Acuity  Visual Acuity      Most Recent Value   L Pupil Size (mm)  3   R Pupil Size (mm)  3          ED Medications  Medications   sodium chloride 0 9 % bolus 1,000 mL (0 mL Intravenous Stopped 7/12/21 1856)   iohexol (OMNIPAQUE) 350 MG/ML injection (MULTI-DOSE) 90 mL (90 mL Intravenous Given 7/12/21 1627)       Diagnostic Studies  Results Reviewed     Procedure Component Value Units Date/Time    Protime-INR [[id number]]  (Normal) Collected: 07/12/21 1535    Lab Status: Final result Specimen: Blood from Arm, Right Updated: 07/12/21 1632     Protime 12 8 seconds      INR 0 96    Troponin I [[id number]]  (Normal) Collected: 07/12/21 1536    Lab Status: Final result Specimen: Blood from Arm, Right Updated: 07/12/21 1602     Troponin I <0 02 ng/mL     Basic metabolic panel [[id number]] Collected: 07/12/21 1535    Lab Status: Final result Specimen: Blood from Arm, Right Updated: 07/12/21 1554     Sodium 141 mmol/L      Potassium 3 6 mmol/L      Chloride 106 mmol/L      CO2 25 mmol/L      ANION GAP 10 mmol/L      BUN 11 mg/dL      Creatinine 0 67 mg/dL      Glucose 98 mg/dL Calcium 8 7 mg/dL      eGFR 95 ml/min/1 73sq m     Narrative:      [place] guidelines for Chronic Kidney Disease (CKD):     Stage 1 with normal or high GFR (GFR > 90 mL/min/1 73 square meters)    Stage 2 Mild CKD (GFR = 60-89 mL/min/1 73 square meters)    Stage 3A Moderate CKD (GFR = 45-59 mL/min/1 73 square meters)    Stage 3B Moderate CKD (GFR = 30-44 mL/min/1 73 square meters)    Stage 4 Severe CKD (GFR = 15-29 mL/min/1 73 square meters)    Stage 5 End Stage CKD (GFR <15 mL/min/1 73 square meters)  Note: GFR calculation is accurate only with a steady state creatinine    CBC and Platelet [[id number]]  (Normal) Collected: 07/12/21 1535    Lab Status: Final result Specimen: Blood from Arm, Right Updated: 07/12/21 1549     WBC 7 57 Thousand/uL      RBC 4 60 Million/uL      Hemoglobin 13 8 g/dL      Hematocrit 42 0 %      MCV 91 fL      MCH 30 0 pg      MCHC 32 9 g/dL      RDW 12 8 %      Platelets 660 Thousands/uL      MPV 10 8 fL     Fingerstick Glucose (POCT) [[id number]]  (Normal) Collected: 07/12/21 1429    Lab Status: Final result Updated: 07/12/21 1430     POC Glucose 100 mg/dl                  CTA head and neck with and without contrast   Final Result by Silvestre Junior DO (07/12 1644)      CT brain: No acute intracranial abnormality  CT angiography: No stenosis, occlusion or thrombosis of the cervical or intracranial vasculature  Workstation performed: [id number]                    Procedures  Procedures         ED Course                                           MDM  Number of Diagnoses or Management Options  Headache: new and requires workup  Word finding difficulty: new and requires workup  Diagnosis management comments: NIH stroke scale is 0 on arrival   Afebrile and hemodynamically stable but reports a left-sided headache  Stroke alert not activated given normal NIH stroke scale    Migraine cocktail ordered the patient declined as her headache resolved prior to meds being administered  I personally interpreted the patient's EKG which reveals normal rate, normal sinus rhythm, normal axis, normal intervals, no ischemic changes  Labs unremarkable  She denies chest pain, shortness of breath, or any systemic symptoms  Visual acuity 20/40 bilaterally with visual fields intact to confrontation  Neuro exam unremarkable as above and CTA was obtained of the head and neck without any abnormalities  Description of symptoms as being preceded by a visual aura, headache, and then word-finding difficulty which she has experienced with ocular migraines in past make me most suspicious for ocular migraine as the cause of her symptoms, though I did discuss with the patient that TIA is a slight possibility  She has follow-up with her primary care doctor already scheduled for tomorrow and is low risk by ABCD2 score so she is unlikely to benefit from admission  Ambulatory referral placed for Neurology  Return precautions discussed  Amount and/or Complexity of Data Reviewed  Clinical lab tests: ordered and reviewed  Tests in the radiology section of CPT®: ordered and reviewed  Independent visualization of images, tracings, or specimens: yes    Patient Progress  Patient progress: resolved           Disposition  Final diagnoses:   Headache   Word finding difficulty     Time reflects when diagnosis was documented in both MDM as applicable and the Disposition within this note     Time User Action Codes Description Comment    7/12/2021  6:43 PM Hayden Stands [R51 9] Headache     7/12/2021  6:43 PM Jackelin Agent Add [R47 89] Word finding difficulty       ED Disposition     ED Disposition Condition Date/Time Comment    Discharge Stable Mon Jul 12, 2021  6:43 PM Hali Sales discharge to home/self care              Follow-up Information     Follow up With Specialties Details Why Contact Info Additional [facility] Schedule an appointment as soon as possible for a visit  For further evaluation of your headaches and occasional word-finding dificulty  [address] [place] [facility] [address] [facility] [place], [address] [facility] [address], [place], [place], [address] [facility]    Mercedes Shepard MD Internal Medicine In 1 day Please follow-up with your doctor tomorrow for further evaluation of your headaches with word-finding difficulty  [address] [facility]  [address] Emergency Department Emergency Medicine  As we discussed, return to the Emergency Department immediately for slurred speech, facial droop, difficulty speaking, numbness or weaknes on one side of your body, severe headache, trouble walking, or vision changes  [address] [facility] [address] [facility] Emergency Department, [address], [place], [place], [address]          Discharge Medication List as of 7/12/2021  6:45 PM      CONTINUE these medications which have NOT CHANGED    Details   ALPRAZolam (XANAX) 0 25 mg tablet TAKE 2 TABLETS DAILY FOR ANXIETY, Historical Med      ascorbic acid (VITAMIN C) 500 mg tablet Vitamin C 500 mg tablet   TAKE 1 TABLET DAILY FOR 50 DAYS, Historical Med      ASMANEX 30 METERED DOSES 110 MCG/INH AEPB inhaler Inhale 1 puff daily, Starting Fri 6/28/2019, Historical Med      Azelastine HCl 0 15 % SOLN INSERT 2 SPRAYS 2 TIMES DAILY  , Historical Med      Brimonidine Tartrate (MIRVASO) 0 33 % GEL Mirvaso 0 33 % topical gel, Historical Med      Calcium Ascorbate 500 MG TABS TAKE 1 TABLET DAILY FOR 50 DAYS, Historical Med      chlorhexidine (PERIDEX) 0 12 % solution RINSE 1/2 OUNCE TWICE A DAY FOR 30 SECONDS EXPECTORATE    NO NOT RINSE, Historical Med      Cholecalciferol (VITAMIN D-3) 5000 units TABS Starting Wed 6/29/2011, Historical Med diclofenac sodium (Voltaren) 1 % Voltaren 1 % topical gel   APPLY 2 GRAM TO THE AFFECTED AREA(S) BY TOPICAL ROUTE 4 TIMES PER DAY, Historical Med      EPINEPHrine (EPIPEN 2-PHOENIX) 0 3 mg/0 3 mL SOAJ EpiPen 2-Phoenix 0 3 mg/0 3 mL injection, auto-injector, Historical Med      estradiol (ESTRACE VAGINAL) 0 1 mg/g vaginal cream INSERT ONE GRAM DAILY VAGINALLY AT NIGHT FOR 14 DAYS, THEN TWICE WEEKLY FOR MAINTENANCE , Historical Med      Fexofenadine HCl (ALLEGRA PO) take 2 tablet (60MG)  by oral route 2 times every day, Historical Med      fluticasone (FLONASE) 50 mcg/act nasal spray fluticasone propionate 50 mcg/actuation nasal spray,suspension, Historical Med      labetalol (NORMODYNE) 100 mg tablet Starting Sat 6/15/2019, Historical Med      !! levothyroxine (SYNTHROID) 137 mcg tablet Synthroid 137 mcg tablet, Historical Med      !! levothyroxine 125 mcg tablet Take 125 mcg by mouth daily  125 mcg & 137 mcg alternate days  , Historical Med      Lidocaine Viscous HCl (XYLOCAINE) 2 % mucosal solution USE 5 ML BY MOUTH 4 TIMES DAILY (BEFORE MEALS AND AT BEDTIME) GARGLE FOR THROAT PAIN , Historical Med      loratadine (CLARITIN) 10 mg tablet TAKE 1T ABLET BY MOUTH DAILY  , Historical Med      losartan (COZAAR) 50 mg tablet TAKE 1 TABLET DAILY, Normal      mometasone (NASONEX) 50 mcg/act nasal spray mometasone 50 mcg/actuation nasal spray, Historical Med      olopatadine (PATANOL) 0 1 % ophthalmic solution olopatadine 0 1 % eye drops, Historical Med      ORACEA 40 MG capsule Take 40 mg by mouth daily, Starting Wed 8/22/2018, Historical Med      pantoprazole (PROTONIX) 40 mg tablet Starting Tue 1/14/2020, Historical Med      Salicylic Acid 6 % CREA salicylic acid 6 % topical cream, Historical Med      urea (CARMOL) 40 % urea 40 % topical cream, Historical Med       !! - Potential duplicate medications found  Please discuss with provider              PDMP Review     None          ED Provider  Electronically Signed by Kerry Jamil MD  07/13/21 8137

## 2021-07-13 ENCOUNTER — OFFICE VISIT (OUTPATIENT)
Dept: INTERNAL MEDICINE CLINIC | Facility: CLINIC | Age: 61
End: 2021-07-13
Payer: COMMERCIAL

## 2021-07-13 VITALS
WEIGHT: 166.6 LBS | TEMPERATURE: 98.5 F | OXYGEN SATURATION: 98 % | DIASTOLIC BLOOD PRESSURE: 90 MMHG | BODY MASS INDEX: 28.44 KG/M2 | HEART RATE: 69 BPM | SYSTOLIC BLOOD PRESSURE: 133 MMHG | HEIGHT: 64 IN

## 2021-07-13 DIAGNOSIS — G43.109 MIGRAINE WITH AURA AND WITHOUT STATUS MIGRAINOSUS, NOT INTRACTABLE: ICD-10-CM

## 2021-07-13 DIAGNOSIS — R47.01 APHASIA: Primary | ICD-10-CM

## 2021-07-13 PROCEDURE — 99214 OFFICE O/P EST MOD 30 MIN: CPT | Performed by: INTERNAL MEDICINE

## 2021-07-13 NOTE — PROGRESS NOTES
Assessment/Plan:    Follow up  migraine     Diagnoses and all orders for this visit:    Aphasia  -     Ambulatory referral to Neurology; Future    Migraine with aura and without status migrainosus, not intractable      Hypertension    Subjective:      Patient ID: Deb Party is a 63 y o  female  Patient came  For  ER  Follow up  Visit  Due  To an  Episode of migraine with  Aphasia  CT scan , angiogram  All  Blood  Work  In the  ER  Negative  NO  Recurrence   Referred to  Neurology  To  See if she needs  MRI>      The following portions of the patient's history were reviewed and updated as appropriate: allergies, current medications, past family history, past medical history, past social history, past surgical history, and problem list     Review of Systems   Constitutional: Negative  HENT: Negative for dental problem, drooling, ear discharge and ear pain  Eyes: Negative for discharge, redness and itching  Respiratory: Negative for apnea, cough and wheezing  Cardiovascular: Negative for chest pain and palpitations  Gastrointestinal: Negative for abdominal pain, blood in stool, diarrhea and vomiting  Endocrine: Negative for polydipsia, polyphagia and polyuria  Genitourinary: Negative for decreased urine volume, dysuria and frequency  Musculoskeletal: Negative for arthralgias, myalgias and neck stiffness  Skin: Negative for pallor and wound  Allergic/Immunologic: Negative for environmental allergies and food allergies  Neurological: Positive for speech difficulty and headaches  Negative for facial asymmetry, light-headedness and numbness  Hematological: Negative for adenopathy  Does not bruise/bleed easily  Psychiatric/Behavioral: Negative for agitation, behavioral problems and confusion           Objective:      /90   Pulse 69   Temp 98 5 °F (36 9 °C)   Ht 5' 4" (1 626 m)   Wt 75 6 kg (166 lb 9 6 oz)   LMP  (LMP Unknown)   SpO2 98%   BMI 28 60 kg/m²          Physical Exam  Constitutional:       Appearance: Normal appearance  She is obese  HENT:      Head: Normocephalic  Nose: Nose normal       Mouth/Throat:      Mouth: Mucous membranes are moist    Eyes:      Pupils: Pupils are equal, round, and reactive to light  Cardiovascular:      Rate and Rhythm: Regular rhythm  Heart sounds: Normal heart sounds  Pulmonary:      Breath sounds: Normal breath sounds  Abdominal:      Palpations: Abdomen is soft  Musculoskeletal:         General: No swelling  Cervical back: Neck supple  Skin:     General: Skin is warm  Neurological:      General: No focal deficit present  Mental Status: She is alert and oriented to person, place, and time  Psychiatric:         Mood and Affect: Mood normal        No  Neurologic  Focal  Findings  Patient  Continue  With  Losartan for her blood  Pressure  Referred to Neurology  To  See if  She  Needs MRI  To rule out small  [place]  Stroke       Fup  With me  After she sees  Neuro    SCOTTIE Pate MD

## 2021-07-14 ENCOUNTER — TELEPHONE (OUTPATIENT)
Dept: NEUROLOGY | Facility: CLINIC | Age: 61
End: 2021-07-14

## 2021-07-14 NOTE — TELEPHONE ENCOUNTER
Best contact number for BLWDQFN:[phone]     Emergency Contact name and number: Maria Del Carmen Jaquez [phone]     Referring provider and telephone number:    [facility]     Reason for Appointment/Dx: migraines     Have you seen and followed up with a pediatric Neurologist for this disease in the past?      No (If yes ok to schedule with Dr Shena Sanabria)    Neurology Location patient would like to be seen:    Order received? Yes                                                 Records Received? Yes    Have you ever seen another Neurologist?       No    Insurance Information    Insurance Name:    ID/Policy #:    Secondary Insurance:    ID/Policy#: Workman's Comp/ Accident/ School  Information      Workman's Comp/Accident/School related?        No    If yes name of Insurance company:    Claim #:    Date of Injury:    Type of Injury:     Name and Telephone Number:    Notes:                   Appointment date: 11/03/2021

## 2021-07-15 ENCOUNTER — PROCEDURE VISIT (OUTPATIENT)
Dept: OBGYN CLINIC | Facility: HOSPITAL | Age: 61
End: 2021-07-15
Payer: COMMERCIAL

## 2021-07-15 VITALS
HEIGHT: 64 IN | BODY MASS INDEX: 28.34 KG/M2 | DIASTOLIC BLOOD PRESSURE: 81 MMHG | HEART RATE: 72 BPM | WEIGHT: 166 LBS | SYSTOLIC BLOOD PRESSURE: 129 MMHG

## 2021-07-15 DIAGNOSIS — M25.562 CHRONIC PAIN OF LEFT KNEE: ICD-10-CM

## 2021-07-15 DIAGNOSIS — M17.12 PRIMARY OSTEOARTHRITIS OF LEFT KNEE: Primary | ICD-10-CM

## 2021-07-15 DIAGNOSIS — G89.29 CHRONIC PAIN OF LEFT KNEE: ICD-10-CM

## 2021-07-15 PROCEDURE — 99213 OFFICE O/P EST LOW 20 MIN: CPT | Performed by: ORTHOPAEDIC SURGERY

## 2021-07-15 PROCEDURE — 20610 DRAIN/INJ JOINT/BURSA W/O US: CPT | Performed by: ORTHOPAEDIC SURGERY

## 2021-07-15 PROCEDURE — 1036F TOBACCO NON-USER: CPT | Performed by: ORTHOPAEDIC SURGERY

## 2021-07-15 RX ORDER — HYALURONATE SODIUM 10 MG/ML
20 SYRINGE (ML) INTRAARTICULAR
Status: COMPLETED | OUTPATIENT
Start: 2021-07-15 | End: 2021-07-15

## 2021-07-15 RX ADMIN — Medication 20 MG: at 16:11

## 2021-07-15 NOTE — PROGRESS NOTES
Assessment:  1  Primary osteoarthritis of left knee     2  Chronic pain of left knee         Plan:  The patient was provided with 1st left knee Euflexxa injection  She tolerated the procedure well  She should follow up in one week for 2nd left knee Euflexxa injection  To do next visit:  Return in about 1 week (around 7/22/2021) for for 2nd left knee Euflexxa injection   The above stated was discussed in layman's terms and the patient expressed understanding  All questions were answered to the patient's satisfaction  Scribe Attestation    I,:  Florin Castañeda am acting as a scribe while in the presence of the attending physician :       I,:  Joshua Russell MD personally performed the services described in this documentation    as scribed in my presence :             Subjective:   Wilda Gant is a 63 y o  female who presents for follow up of left knee and 1st Euflexxa injection  Today she complains of generalized left knee pain  Prolonged weight bearing and repetitive bending aggravates while rest alleviates          Review of systems negative unless otherwise specified in HPI    Past Medical History:   Diagnosis Date    Allergic rhinitis     Asthma     Disease of thyroid gland     Hashimoto's    Fibroid     Hypertension     Hypothyroidism     Primary osteoarthritis of knee     Left - last assessed: Aug 23, 2017    Seasonal allergies     Varicella        Past Surgical History:   Procedure Laterality Date    APPENDECTOMY      COLONOSCOPY  06/30/2014    DIAGNOSTIC LAPAROSCOPY      HAND SURGERY Left     MAMMO (HISTORICAL)  11/26/2016    THYROIDECTOMY  1998    partial       Family History   Problem Relation Age of Onset    Hypertension Mother     Hyperlipidemia Mother     Skin cancer Mother     Stroke Mother     Diabetes Father     Heart attack Father     Stroke Father     Skin cancer Father     Breast cancer Maternal Aunt         > 48    Colon cancer Maternal Grandmother >50    No Known Problems Daughter     No Known Problems Maternal Aunt     No Known Problems Maternal Aunt     Hyperlipidemia Family     Hypertension Family     Heart disease Family        Social History     Occupational History    Not on file   Tobacco Use    Smoking status: Never Smoker    Smokeless tobacco: Never Used   Vaping Use    Vaping Use: Never used   Substance and Sexual Activity    Alcohol use: Yes    Drug use: No    Sexual activity: Not Currently     Birth control/protection: Post-menopausal         Current Outpatient Medications:     ASMANEX 30 METERED DOSES 110 MCG/INH AEPB inhaler, Inhale 1 puff daily, Disp: , Rfl: 1    Brimonidine Tartrate (MIRVASO) 0 33 % GEL, Mirvaso 0 33 % topical gel, Disp: , Rfl:     Cholecalciferol (VITAMIN D-3) 5000 units TABS, , Disp: , Rfl:     diclofenac sodium (Voltaren) 1 %, Voltaren 1 % topical gel  APPLY 2 GRAM TO THE AFFECTED AREA(S) BY TOPICAL ROUTE 4 TIMES PER DAY, Disp: , Rfl:     EPINEPHrine (EPIPEN 2-PHOENIX) 0 3 mg/0 3 mL SOAJ, EpiPen 2-Phoenix 0 3 mg/0 3 mL injection, auto-injector, Disp: , Rfl:     estradiol (ESTRACE VAGINAL) 0 1 mg/g vaginal cream, INSERT ONE GRAM DAILY VAGINALLY AT NIGHT FOR 14 DAYS, THEN TWICE WEEKLY FOR MAINTENANCE , Disp: , Rfl:     Fexofenadine HCl (ALLEGRA PO), take 2 tablet (60MG)  by oral route 2 times every day, Disp: , Rfl:     levothyroxine (SYNTHROID) 137 mcg tablet, Synthroid 137 mcg tablet, Disp: , Rfl:     levothyroxine 125 mcg tablet, Take 125 mcg by mouth daily  125 mcg & 137 mcg alternate days  , Disp: , Rfl:     losartan (COZAAR) 50 mg tablet, TAKE 1 TABLET DAILY, Disp: 90 tablet, Rfl: 3    olopatadine (PATANOL) 0 1 % ophthalmic solution, olopatadine 0 1 % eye drops, Disp: , Rfl:     ORACEA 40 MG capsule, Take 40 mg by mouth daily, Disp: , Rfl: 3    pantoprazole (PROTONIX) 40 mg tablet, Take 20 mg by mouth daily , Disp: , Rfl:     Salicylic Acid 6 % CREA, salicylic acid 6 % topical cream, Disp: , Rfl:     urea (CARMOL) 40 %, urea 40 % topical cream, Disp: , Rfl:     ALPRAZolam (XANAX) 0 25 mg tablet, TAKE 2 TABLETS DAILY FOR ANXIETY, Disp: , Rfl: 2    ascorbic acid (VITAMIN C) 500 mg tablet, Vitamin C 500 mg tablet  TAKE 1 TABLET DAILY FOR 50 DAYS (Patient not taking: Reported on 7/13/2021), Disp: , Rfl:     Azelastine HCl 0 15 % SOLN, INSERT 2 SPRAYS 2 TIMES DAILY  (Patient not taking: Reported on 7/13/2021), Disp: , Rfl: 0    Calcium Ascorbate 500 MG TABS, TAKE 1 TABLET DAILY FOR 50 DAYS (Patient not taking: Reported on 7/13/2021), Disp: , Rfl:     chlorhexidine (PERIDEX) 0 12 % solution, RINSE 1/2 OUNCE TWICE A DAY FOR 30 SECONDS EXPECTORATE   NO NOT RINSE (Patient not taking: Reported on 7/13/2021), Disp: , Rfl: 0    fluticasone (FLONASE) 50 mcg/act nasal spray, fluticasone propionate 50 mcg/actuation nasal spray,suspension (Patient not taking: Reported on 7/13/2021), Disp: , Rfl:     labetalol (NORMODYNE) 100 mg tablet, , Disp: , Rfl:     Lidocaine Viscous HCl (XYLOCAINE) 2 % mucosal solution, USE 5 ML BY MOUTH 4 TIMES DAILY (BEFORE MEALS AND AT BEDTIME) GARGLE FOR THROAT PAIN  (Patient not taking: Reported on 7/13/2021), Disp: , Rfl: 0    loratadine (CLARITIN) 10 mg tablet, TAKE 1T ABLET BY MOUTH DAILY  (Patient not taking: Reported on 7/13/2021), Disp: , Rfl: 0    mometasone (NASONEX) 50 mcg/act nasal spray, mometasone 50 mcg/actuation nasal spray, Disp: , Rfl:     No Known Allergies       There were no vitals filed for this visit      Objective:  Physical exam  · General: Awake, Alert, Oriented  · Eyes: Pupils equal, round and reactive to light  · Heart: regular rate and rhythm  · Lungs: No audible wheezing  · Abdomen: soft                    Ortho Exam  Left knee:  Varus alignment  No erythema or ecchymosis  No effusion or swelling  Normal strength  Good ROM with crepitus   Calf compartments soft and supple  Sensation intact  Toes are warm sensate and mobile        Diagnostics, reviewed and taken today if performed as documented:    None performed     Procedures, if performed today:    Large joint arthrocentesis: L knee  Universal Protocol:  Consent: Verbal consent obtained  Risks and benefits: risks, benefits and alternatives were discussed  Consent given by: patient  Time out: Immediately prior to procedure a "time out" was called to verify the correct patient, procedure, equipment, support staff and site/side marked as required  Timeout called at: 7/15/2021 4:11 PM   Patient understanding: patient states understanding of the procedure being performed  Patient identity confirmed: verbally with patient    Supporting Documentation  Indications: pain   Procedure Details  Location: knee - L knee  Preparation: Patient was prepped and draped in the usual sterile fashion  Needle size: 22 G  Ultrasound guidance: no  Approach: anterolateral  Medications administered: 20 mg Sodium Hyaluronate 20 MG/2ML    Patient tolerance: patient tolerated the procedure well with no immediate complications  Dressing:  Sterile dressing applied              Portions of the record may have been created with voice recognition software  Occasional wrong word or "sound a like" substitutions may have occurred due to the inherent limitations of voice recognition software  Read the chart carefully and recognize, using context, where substitutions have occurred

## 2021-07-16 ENCOUNTER — TELEPHONE (OUTPATIENT)
Dept: HEMATOLOGY ONCOLOGY | Facility: CLINIC | Age: 61
End: 2021-07-16

## 2021-07-16 ENCOUNTER — OFFICE VISIT (OUTPATIENT)
Dept: HEMATOLOGY ONCOLOGY | Facility: CLINIC | Age: 61
End: 2021-07-16
Payer: COMMERCIAL

## 2021-07-16 ENCOUNTER — TELEPHONE (OUTPATIENT)
Dept: INTERNAL MEDICINE CLINIC | Facility: CLINIC | Age: 61
End: 2021-07-16

## 2021-07-16 VITALS
DIASTOLIC BLOOD PRESSURE: 90 MMHG | HEIGHT: 64 IN | WEIGHT: 170 LBS | SYSTOLIC BLOOD PRESSURE: 140 MMHG | OXYGEN SATURATION: 97 % | TEMPERATURE: 97.2 F | BODY MASS INDEX: 29.02 KG/M2 | RESPIRATION RATE: 17 BRPM | HEART RATE: 87 BPM

## 2021-07-16 DIAGNOSIS — G43.109 MIGRAINE WITH AURA AND WITHOUT STATUS MIGRAINOSUS, NOT INTRACTABLE: ICD-10-CM

## 2021-07-16 DIAGNOSIS — D70.9 NEUTROPENIA, UNSPECIFIED TYPE (HCC): Primary | ICD-10-CM

## 2021-07-16 DIAGNOSIS — Z86.69 HISTORY OF MIGRAINE HEADACHES: Primary | ICD-10-CM

## 2021-07-16 PROCEDURE — 3008F BODY MASS INDEX DOCD: CPT | Performed by: ORTHOPAEDIC SURGERY

## 2021-07-16 PROCEDURE — 99215 OFFICE O/P EST HI 40 MIN: CPT | Performed by: PHYSICIAN ASSISTANT

## 2021-07-16 RX ORDER — SUMATRIPTAN 50 MG/1
50 TABLET, FILM COATED ORAL ONCE AS NEEDED
Qty: 9 TABLET | Refills: 0 | Status: SHIPPED | OUTPATIENT
Start: 2021-07-16

## 2021-07-16 NOTE — PROGRESS NOTES
[address] [facility] [id number]  Hematology Ambulatory Follow-Up  Grace Monahan, 1960, [id number]  7/16/2021    Assessment/Plan:    1  Neutropenia, unspecified type [facility])  This is a 23-year-old female with longstanding history of neutropenia  Patient has always been asymptomatic  Patient's neutropenia dates back to at least 2012  Patient has been under hematology care initial evaluation as well subsequent observation for a year  Need for subsequent follow-up is not necessary unless the patient develops B symptoms, including unplanned weight loss, progressive profound fatigue, increased infections, hospitalizations for infection, lymphadenopathy and fevers of unknown origin  Patient is agreeable to following up with primary care provider, as well as her endocrinologist   If there is any additional hematologic concerns, patient will come back to the office  2  Migraine with aura and without status migrainosus, not intractable  Patient developed a migraine syndrome following pelvic floor surgery which included hysterectomy and oophorectomy as well as a bladder sling and mesh placement  I question whether migraine is secondary to hormonal changes verses anesthesia  Patient was advised to follow-up with neurology that can can appointment until November  I have advised the patient to follow back up with primary care doctor to see a rescue migraine medication can be prescribed  If this is truly related to this surgery or even to hormonal changes, it will likely self resolve even before she can get in with Neurology  Patient voiced agreement and understanding to the above  Patient knows to call the Hematology/Oncology office with any questions and concerns regarding the above      Barrier(s) to care: None  The patient is able to self care   ------------------------------------------------------------------------------------------------------    Chief Complaint   Patient presents with    Follow-up       History of present illness: This is a 60-year-old female with past medical history chronic asthma,  chronic allergies taking Allegra once a day secondary to generalized pruritus/hisatime reaction, hypertension, Hashimoto's thyroiditis on Synthroid status post partial thyroidectomy, hiatal hernia and GERD controlled by dietary adjustments who was referred to [facility] hematology from her endocrinologist for evaluation of mild thrombocytopenia (Dr Darrell Diaz)         Patient notes that she has had neutropenia and leukopenia for years  Wattsroxanna AguiarDayron states that she believes she was evaluated approximately 10 years ago secondary for similar issue   At that time, her blood counts were cyclical and her neutropenia would resolve spontaneously  Sarahyshane Hernández white blood cell count has never been very robust in normally ranges around 4000 per unit L neutropenia is also regularly below 2000 per unit L       patient was referred to come back as patient's counts had stopped cycling and were consistently in the abnormal low range        Patient denies having bone marrow biopsy completed previously  [facility] does admit to need for B12 injections as a child, unsure of situation surrounding this        Patient denies profound fatigue however does admit to some mild fatigue compared to the time last year secondary to COVID-19, chronic anxiety and depression related to the pandemic   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    Patient notes that she has hot flashes but denies drenching night sweats   Patient has an average of 2 infections a year  Bessy Colon states that compared to 10 years ago she would have may be 1 but denies issues with recurring fevers of unknown origin and states that sinus infections completely resolved with the administration of antibiotics   Interestingly,  She denies fever with presentation of sinus infection ( likely viral )      laboratory results:   10/29/12 WBC = 4 0, hemoglobin = 14 6, platelet count = 290, ANC = 1976   6/19/19 WBC = 3 3, hemoglobin = 13 4, platelet count = 013, ANC = 1490   12/7/19 WBC = 3 8, hemoglobin = 13 4, platelet count = 259, ANC = 1486   6/12/20 WBC = 3 4, hemoglobin = 13 3, platelet count = 841, ANC = 1289     Last colonoscopy/Endoscopy: 8/2019  Mammo and Pap : UTD within th elast year with Transvaginal u/s     7/18/2020: Folate = 8 7, MMA = 117, copper = 91, comprehensive metabolic panel = WNL- no abnormal tests, total white blood cell count = 3 4, hemoglobin = 13 4, hematocrit = 41 2, platelet count = 716, ANC = 13 40, CRP and sed rates within normal limits, HIV and hepatitis screen including AB and C all negative, flow cytometry= within normal limits      2/27/21: WBC = 3 1, hemoglobin = 13 6, hematocrit = 41 6, platelet count = 589,   6/23/2021:  WBC = 3 6, hemoglobin = 13 7, platelet count = 186 (preop labs)  Pelvic floor surgery on 6/28/2021  No complications  7/9/2021 wbc= 4 7, hemoglobin 13 5, platelet count = 050, ANC = two 0 19  7/12/2021:  WBC = 7 57, hemoglobin = 13 8, platelet count = 895 (Taken in ED due to migraine)      Interval history:  She notes that she has developed a migraine syndrome our last appointment  Patient states that she has or associated aphasia  Patient went to the emergency room due to her children's concern of stroke  Patient's symptoms spontaneously resolved  Patient went to PCP, following up with Neurology in November, [address] appointment  Patient notes surgery went well occurring in late June 2021  Patient's post surgical follow-up stated that migraine headaches are unlikely related to a hormone withdrawal syndrome but more likely related to surgery/anesthesia      Review of Systems   Constitutional: Negative for appetite change, fatigue, fever and unexpected weight change  HENT: Negative for nosebleeds  Respiratory: Negative for cough, choking and shortness of breath  Negative hemoptysis  Cardiovascular: Negative for chest pain, palpitations and leg swelling  Gastrointestinal: Negative  Negative for abdominal distention, abdominal pain, anal bleeding, blood in stool, constipation, diarrhea, nausea and vomiting  Endocrine: Negative  Negative for cold intolerance  Genitourinary: Negative  Negative for hematuria, menstrual problem, vaginal bleeding, vaginal discharge and vaginal pain  Recent hysterectomy with pelvic floor reconstruction, bladder sling on 6/28   Musculoskeletal: Negative  Negative for arthralgias, myalgias, neck pain and neck stiffness  Skin: Negative  Negative for color change, pallor and rash  Allergic/Immunologic: Negative  Negative for immunocompromised state  Neurological: Positive for headaches  Negative for weakness  Hematological: Negative for adenopathy  Does not bruise/bleed easily  All other systems reviewed and are negative      Patient Active Problem List   Diagnosis    Asthma    Leiomyoma of uterus    Chronic pain of left knee    Prolapse of anterior vaginal wall    Stress incontinence in female    Neutropenia, unspecified ([facility] [address] )     Past Medical History:   Diagnosis Date    Allergic rhinitis     Asthma     Disease of thyroid gland     Hashimoto's    Fibroid     Hypertension     Hypothyroidism     Primary osteoarthritis of knee     Left - last assessed: Aug 23, 2017    Seasonal allergies     Varicella      Past Surgical History:   Procedure Laterality Date    APPENDECTOMY      COLONOSCOPY  06/30/2014    DIAGNOSTIC LAPAROSCOPY      HAND SURGERY Left     MAMMO (HISTORICAL)  11/26/2016    THYROIDECTOMY  1998    partial     Family History   Problem Relation Age of Onset    Hypertension Mother     Hyperlipidemia Mother     Skin cancer Mother     Stroke Mother     Diabetes Father     Heart attack Father     Stroke Father     Skin cancer Father     Breast cancer Maternal Aunt         > 48    Colon cancer Maternal Grandmother         >50    No Known Problems Daughter     No Known Problems Maternal Aunt     No Known Problems Maternal Aunt     Hyperlipidemia Family     Hypertension Family     Heart disease Family      Social History     Socioeconomic History    Marital status:      Spouse name: None    Number of children: None    Years of education: None    Highest education level: None   Occupational History    None   Tobacco Use    Smoking status: Never Smoker    Smokeless tobacco: Never Used   Vaping Use    Vaping Use: Never used   Substance and Sexual Activity    Alcohol use: Yes    Drug use: No    Sexual activity: Not Currently     Birth control/protection: Post-menopausal   Other Topics Concern    None   Social History Narrative    Marital status:  - As per [facility]     Social Determinants of Health     Financial Resource Strain:     Difficulty of Paying Living Expenses:    Food Insecurity:     Worried About Running Out of Food in the Last Year:     [address] in the Last Year:    Transportation Needs:     Lack of Transportation (Medical):      Lack of Transportation (Non-Medical):    Physical Activity:     Days of Exercise per Week:     Minutes of Exercise per Session:    Stress:     Feeling of Stress :    Social Connections:     Frequency of Communication with Friends and Family:     Frequency of Social Gatherings with Friends and Family:     Attends [employer] Services:     Active Member of Clubs or Organizations:     Attends Club or Organization Meetings:     Marital Status:    Intimate Partner Violence:     Fear of Current or Ex-Partner:     Emotionally Abused:     Physically Abused:     Sexually Abused:        Current Outpatient Medications:     ALPRAZolam (XANAX) 0 25 mg tablet, TAKE 2 TABLETS DAILY FOR ANXIETY, Disp: , Rfl: 2    ASMANEX 30 METERED DOSES 110 MCG/INH AEPB inhaler, Inhale 1 puff daily, Disp: , Rfl: 1    Brimonidine Tartrate (MIRVASO) 0 33 % GEL, Mirvaso 0 33 % topical gel, Disp: , Rfl:     Cholecalciferol (VITAMIN D-3) 5000 units TABS, , Disp: , Rfl:     diclofenac sodium (Voltaren) 1 %, Voltaren 1 % topical gel  APPLY 2 GRAM TO THE AFFECTED AREA(S) BY TOPICAL ROUTE 4 TIMES PER DAY, Disp: , Rfl:     EPINEPHrine (EPIPEN 2-PHOENIX) 0 3 mg/0 3 mL SOAJ, EpiPen 2-Phoenix 0 3 mg/0 3 mL injection, auto-injector, Disp: , Rfl:     estradiol (ESTRACE VAGINAL) 0 1 mg/g vaginal cream, INSERT ONE GRAM DAILY VAGINALLY AT NIGHT FOR 14 DAYS, THEN TWICE WEEKLY FOR MAINTENANCE , Disp: , Rfl:     Fexofenadine HCl (ALLEGRA PO), take 2 tablet (60MG)  by oral route 2 times every day, Disp: , Rfl:     levothyroxine (SYNTHROID) 137 mcg tablet, Synthroid 137 mcg tablet, Disp: , Rfl:     levothyroxine 125 mcg tablet, Take 125 mcg by mouth daily  125 mcg & 137 mcg alternate days  , Disp: , Rfl:     losartan (COZAAR) 50 mg tablet, TAKE 1 TABLET DAILY, Disp: 90 tablet, Rfl: 3    mometasone (NASONEX) 50 mcg/act nasal spray, mometasone 50 mcg/actuation nasal spray, Disp: , Rfl:     olopatadine (PATANOL) 0 1 % ophthalmic solution, olopatadine 0 1 % eye drops, Disp: , Rfl:     ORACEA 40 MG capsule, Take 40 mg by mouth daily, Disp: , Rfl: 3    pantoprazole (PROTONIX) 40 mg tablet, Take 20 mg by mouth daily , Disp: , Rfl:     Salicylic Acid 6 % CREA, salicylic acid 6 % topical cream, Disp: , Rfl:     urea (CARMOL) 40 %, urea 40 % topical cream, Disp: , Rfl:     ascorbic acid (VITAMIN C) 500 mg tablet, Vitamin C 500 mg tablet  TAKE 1 TABLET DAILY FOR 50 DAYS (Patient not taking: Reported on 7/13/2021), Disp: , Rfl:     Azelastine HCl 0 15 % SOLN, INSERT 2 SPRAYS 2 TIMES DAILY   (Patient not taking: Reported on 7/13/2021), Disp: , Rfl: 0    Calcium Ascorbate 500 MG TABS, TAKE 1 TABLET DAILY FOR 50 DAYS (Patient not taking: Reported on 7/13/2021), Disp: , Rfl:     chlorhexidine (PERIDEX) 0 12 % solution, RINSE 1/2 OUNCE TWICE A DAY FOR 30 SECONDS EXPECTORATE   NO NOT RINSE (Patient not taking: Reported on 7/13/2021), Disp: , Rfl: 0    fluticasone (FLONASE) 50 mcg/act nasal spray, fluticasone propionate 50 mcg/actuation nasal spray,suspension (Patient not taking: Reported on 7/13/2021), Disp: , Rfl:     labetalol (NORMODYNE) 100 mg tablet, , Disp: , Rfl:     Lidocaine Viscous HCl (XYLOCAINE) 2 % mucosal solution, USE 5 ML BY MOUTH 4 TIMES DAILY (BEFORE MEALS AND AT BEDTIME) GARGLE FOR THROAT PAIN  (Patient not taking: Reported on 7/13/2021), Disp: , Rfl: 0    loratadine (CLARITIN) 10 mg tablet, TAKE 1T ABLET BY MOUTH DAILY  (Patient not taking: Reported on 7/13/2021), Disp: , Rfl: 0  No current facility-administered medications for this visit  No Known Allergies    Objective:  /90 (BP Location: Left arm, Patient Position: Sitting, Cuff Size: Adult)   Pulse 87   Temp (!) 97 2 °F (36 2 °C)   Resp 17   Ht 5' 4" (1 626 m)   Wt 77 1 kg (170 lb)   LMP  (LMP Unknown)   SpO2 97%   BMI 29 18 kg/m²    Physical Exam  Constitutional:       General: She is not in acute distress  Appearance: She is well-developed  HENT:      Head: Normocephalic and atraumatic  Eyes:      General: No scleral icterus  Pupils: Pupils are equal, round, and reactive to light  Cardiovascular:      Rate and Rhythm: Normal rate and regular rhythm  Heart sounds: No murmur heard  Pulmonary:      Effort: Pulmonary effort is normal  No respiratory distress  Skin:     General: Skin is warm  Coloration: Skin is not pale  Findings: No rash  Neurological:      Mental Status: She is alert and oriented to person, place, and time  Psychiatric:         Thought Content:  Thought content normal          Result Review  Labs:  Admission on 07/12/2021, Discharged on 07/12/2021   Component Date Value Ref Range Status    POC Glucose 07/12/2021 100  65 - 140 mg/dl Final    Sodium 07/12/2021 141  136 - 145 mmol/L Final    Potassium 07/12/2021 3 6  3 5 - 5 3 mmol/L Final    Chloride 07/12/2021 106  100 - 108 mmol/L Final    CO2 07/12/2021 25  21 - 32 mmol/L Final    ANION GAP 07/12/2021 10  4 - 13 mmol/L Final    BUN 07/12/2021 11  5 - 25 mg/dL Final    Creatinine 07/12/2021 0 67  0 60 - 1 30 mg/dL Final    Standardized to IDMS reference method    Glucose 07/12/2021 98  65 - 140 mg/dL Final    If the patient is fasting, the ADA then defines impaired fasting glucose as > 100 mg/dL and diabetes as > or equal to 123 mg/dL  Specimen collection should occur prior to Sulfasalazine administration due to the potential for falsely depressed results  Specimen collection should occur prior to Sulfapyridine administration due to the potential for falsely elevated results      Calcium 07/12/2021 8 7  8 3 - 10 1 mg/dL Final    eGFR 07/12/2021 95  ml/min/1 73sq m Final    WBC 07/12/2021 7 57  4 31 - 10 16 Thousand/uL Final    RBC 07/12/2021 4 60  3 81 - 5 12 Million/uL Final    Hemoglobin 07/12/2021 13 8  11 5 - 15 4 g/dL Final    Hematocrit 07/12/2021 42 0  34 8 - 46 1 % Final    MCV 07/12/2021 91  82 - 98 fL Final    MCH 07/12/2021 30 0  26 8 - 34 3 pg Final    MCHC 07/12/2021 32 9  31 4 - 37 4 g/dL Final    RDW 07/12/2021 12 8  11 6 - 15 1 % Final    Platelets 79/01/8575 302  149 - 390 Thousands/uL Final    MPV 07/12/2021 10 8  8 9 - 12 7 fL Final    Protime 07/12/2021 12 8  11 6 - 14 5 seconds Final    INR 07/12/2021 0 96  0 84 - 1 19 Final    Troponin I 07/12/2021 <0 02  <=0 04 ng/mL Final    [employer] Chemistry analyzer 99% cutoff is > 0 04 ng/mL in network labs     o cTnI 99% cutoff is useful only when applied to patients in the clinical setting of myocardial ischemia   o cTnI 99% cutoff should be interpreted in the context of clinical history, ECG findings and possibly cardiac imaging to establish correct diagnosis  o cTnI 99% cutoff may be suggestive but clearly not indicative of a coronary event without the clinical setting of myocardial ischemia   Ventricular Rate 07/12/2021 72  BPM Final    Atrial Rate 07/12/2021 72  BPM Final    ID Interval 07/12/2021 164  ms Final    QRSD Interval 07/12/2021 106  ms Final    QT Interval 07/12/2021 390  ms Final    QTC Interval 07/12/2021 427  ms Final    P Axis 07/12/2021 55  degrees Final    QRS Axis 07/12/2021 77  degrees Final    T Wave Axis 07/12/2021 59  degrees Final     Please note: This report has been generated by a voice recognition software system  Therefore there may be syntax, spelling, and/or grammatical errors  Please call if you have any questions

## 2021-07-16 NOTE — LETTER
July 16, 2021     Natalie Chou MD  [address] [address]    Patient: Helen Powell   YOB: 1960   Date of Visit: 7/16/2021       Dear Dr Melisa Mccartney: Thank you for referring Yasmine Cody to me for evaluation  Below are my notes for this consultation  If you have questions, please do not hesitate to call me  I look forward to following your patient along with you  Sincerely,        Jose Kenney PA-C        CC: MD Jose Wu PA-C  7/16/2021 11:12 AM  Sign when Signing Visit  [address] [address]  Hematology Ambulatory Follow-Up  Helen Powell, 1960, [id number]  7/16/2021    Assessment/Plan:    1  Neutropenia, unspecified type [facility])  This is a 69-year-old female with longstanding history of neutropenia  Patient has always been asymptomatic  Patient's neutropenia dates back to at least 2012  Patient has been under hematology care initial evaluation as well subsequent observation for a year  Need for subsequent follow-up is not necessary unless the patient develops B symptoms, including unplanned weight loss, progressive profound fatigue, increased infections, hospitalizations for infection, lymphadenopathy and fevers of unknown origin  Patient is agreeable to following up with primary care provider, as well as her endocrinologist   If there is any additional hematologic concerns, patient will come back to the office  2  Migraine with aura and without status migrainosus, not intractable  Patient developed a migraine syndrome following pelvic floor surgery which included hysterectomy and oophorectomy as well as a bladder sling and mesh placement  I question whether migraine is secondary to hormonal changes verses anesthesia  Patient was advised to follow-up with neurology that can can appointment until November    I have advised the patient to follow back up with primary care doctor to see a rescue migraine medication can be prescribed  If this is truly related to this surgery or even to hormonal changes, it will likely self resolve even before she can get in with Neurology  Patient voiced agreement and understanding to the above  Patient knows to call the Hematology/Oncology office with any questions and concerns regarding the above  Barrier(s) to care: None  The patient is able to self care   ------------------------------------------------------------------------------------------------------    Chief Complaint   Patient presents with    Follow-up       History of present illness: This is a 65-year-old female with past medical history chronic asthma,  chronic allergies taking Allegra once a day secondary to generalized pruritus/hisatime reaction, hypertension, Hashimoto's thyroiditis on Synthroid status post partial thyroidectomy, hiatal hernia and GERD controlled by dietary adjustments who was referred to [facility] hematology from her endocrinologist for evaluation of mild thrombocytopenia (Dr Aaron Lund)         Patient notes that she has had neutropenia and leukopenia for years  Shahla Walton states that she believes she was evaluated approximately 10 years ago secondary for similar issue   At that time, her blood counts were cyclical and her neutropenia would resolve spontaneously  Tino Patiño white blood cell count has never been very robust in normally ranges around 4000 per unit L neutropenia is also regularly below 2000 per unit L       patient was referred to come back as patient's counts had stopped cycling and were consistently in the abnormal low range        Patient denies having bone marrow biopsy completed previously  Shahla Walton does admit to need for B12 injections as a child, unsure of situation surrounding this        Patient denies profound fatigue however does admit to some mild fatigue compared to the time last year secondary to COVID-19, chronic anxiety and depression related to the pandemic   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    Patient notes that she has hot flashes but denies drenching night sweats  Pam Wade has an average of 2 infections a year  Wing Jovanny states that compared to 10 years ago she would have may be 1 but denies issues with recurring fevers of unknown origin and states that sinus infections completely resolved with the administration of antibiotics   Interestingly,  She denies fever with presentation of sinus infection ( likely viral )      laboratory results:   10/29/12 WBC = 4 0, hemoglobin = 14 6, platelet count = 094, ANC = 1976   6/19/19 WBC = 3 3, hemoglobin = 13 4, platelet count = 318, ANC = 1490   12/7/19 WBC = 3 8, hemoglobin = 13 4, platelet count = 685, ANC = 1486   6/12/20 WBC = 3 4, hemoglobin = 13 3, platelet count = 309, ANC = 1289     Last colonoscopy/Endoscopy: 8/2019  Mammo and Pap : UTD within th elast year with Transvaginal u/s     7/18/2020: Folate = 8 7, MMA = 117, copper = 91, comprehensive metabolic panel = WNL- no abnormal tests, total white blood cell count = 3 4, hemoglobin = 13 4, hematocrit = 41 2, platelet count = 124, ANC = 13 40, CRP and sed rates within normal limits, HIV and hepatitis screen including AB and C all negative, flow cytometry= within normal limits      2/27/21: WBC = 3 1, hemoglobin = 13 6, hematocrit = 41 6, platelet count = 057,   6/23/2021:  WBC = 3 6, hemoglobin = 13 7, platelet count = 771 (preop labs)  Pelvic floor surgery on 6/28/2021  No complications  7/9/2021 wbc= 4 7, hemoglobin 13 5, platelet count = 396, ANC = two 0 19  7/12/2021:  WBC = 7 57, hemoglobin = 13 8, platelet count = 879 (Taken in ED due to migraine)      Interval history:  She notes that she has developed a migraine syndrome our last appointment  Patient states that she has or associated aphasia    Patient went to the emergency room due to her children's concern of stroke  Patient's symptoms spontaneously resolved  Patient went to PCP, following up with Neurology in November, [address] appointment  Patient notes surgery went well occurring in late June 2021  Patient's post surgical follow-up stated that migraine headaches are unlikely related to a hormone withdrawal syndrome but more likely related to surgery/anesthesia  Review of Systems   Constitutional: Negative for appetite change, fatigue, fever and unexpected weight change  HENT: Negative for nosebleeds  Respiratory: Negative for cough, choking and shortness of breath  Negative hemoptysis  Cardiovascular: Negative for chest pain, palpitations and leg swelling  Gastrointestinal: Negative  Negative for abdominal distention, abdominal pain, anal bleeding, blood in stool, constipation, diarrhea, nausea and vomiting  Endocrine: Negative  Negative for cold intolerance  Genitourinary: Negative  Negative for hematuria, menstrual problem, vaginal bleeding, vaginal discharge and vaginal pain  Recent hysterectomy with pelvic floor reconstruction, bladder sling on 6/28   Musculoskeletal: Negative  Negative for arthralgias, myalgias, neck pain and neck stiffness  Skin: Negative  Negative for color change, pallor and rash  Allergic/Immunologic: Negative  Negative for immunocompromised state  Neurological: Positive for headaches  Negative for weakness  Hematological: Negative for adenopathy  Does not bruise/bleed easily  All other systems reviewed and are negative      Patient Active Problem List   Diagnosis    Asthma    Leiomyoma of uterus    Chronic pain of left knee    Prolapse of anterior vaginal wall    Stress incontinence in female    Neutropenia, unspecified ([facility] [address] )     Past Medical History:   Diagnosis Date    Allergic rhinitis     Asthma     Disease of thyroid gland     Hashimoto's    Fibroid     Hypertension     Hypothyroidism     Primary osteoarthritis of knee     Left - last assessed: Aug 23, 2017    Seasonal allergies     Varicella      Past Surgical History:   Procedure Laterality Date    APPENDECTOMY      COLONOSCOPY  06/30/2014    DIAGNOSTIC LAPAROSCOPY      HAND SURGERY Left     MAMMO (HISTORICAL)  11/26/2016    THYROIDECTOMY  1998    partial     Family History   Problem Relation Age of Onset    Hypertension Mother     Hyperlipidemia Mother     Skin cancer Mother     Stroke Mother     Diabetes Father     Heart attack Father     Stroke Father     Skin cancer Father     Breast cancer Maternal Aunt         > 48    Colon cancer Maternal Grandmother         >50    No Known Problems Daughter     No Known Problems Maternal Aunt     No Known Problems Maternal Aunt     Hyperlipidemia Family     Hypertension Family     Heart disease Family      Social History     Socioeconomic History    Marital status:      Spouse name: None    Number of children: None    Years of education: None    Highest education level: None   Occupational History    None   Tobacco Use    Smoking status: Never Smoker    Smokeless tobacco: Never Used   Vaping Use    Vaping Use: Never used   Substance and Sexual Activity    Alcohol use: Yes    Drug use: No    Sexual activity: Not Currently     Birth control/protection: Post-menopausal   Other Topics Concern    None   Social History Narrative    Marital status:  - As per [facility]     Social Determinants of Health     Financial Resource Strain:     Difficulty of Paying Living Expenses:    Food Insecurity:     Worried About Running Out of Food in the Last Year:     [address] in the Last Year:    Transportation Needs:     Lack of Transportation (Medical):      Lack of Transportation (Non-Medical):    Physical Activity:     Days of Exercise per Week:     Minutes of Exercise per Session:    Stress:     Feeling of Stress :    Social Connections:     Frequency of Communication with Friends and Family:     Frequency of Social Gatherings with Friends and Family:     Attends Church Services:     Active Member of Clubs or Organizations:     Attends Club or Organization Meetings:     Marital Status:    Intimate Partner Violence:     Fear of Current or Ex-Partner:     Emotionally Abused:     Physically Abused:     Sexually Abused:        Current Outpatient Medications:     ALPRAZolam (XANAX) 0 25 mg tablet, TAKE 2 TABLETS DAILY FOR ANXIETY, Disp: , Rfl: 2    ASMANEX 30 METERED DOSES 110 MCG/INH AEPB inhaler, Inhale 1 puff daily, Disp: , Rfl: 1    Brimonidine Tartrate (MIRVASO) 0 33 % GEL, Mirvaso 0 33 % topical gel, Disp: , Rfl:     Cholecalciferol (VITAMIN D-3) 5000 units TABS, , Disp: , Rfl:     diclofenac sodium (Voltaren) 1 %, Voltaren 1 % topical gel  APPLY 2 GRAM TO THE AFFECTED AREA(S) BY TOPICAL ROUTE 4 TIMES PER DAY, Disp: , Rfl:     EPINEPHrine (EPIPEN 2-PHOENIX) 0 3 mg/0 3 mL SOAJ, EpiPen 2-Phoenix 0 3 mg/0 3 mL injection, auto-injector, Disp: , Rfl:     estradiol (ESTRACE VAGINAL) 0 1 mg/g vaginal cream, INSERT ONE GRAM DAILY VAGINALLY AT NIGHT FOR 14 DAYS, THEN TWICE WEEKLY FOR MAINTENANCE , Disp: , Rfl:     Fexofenadine HCl (ALLEGRA PO), take 2 tablet (60MG)  by oral route 2 times every day, Disp: , Rfl:     levothyroxine (SYNTHROID) 137 mcg tablet, Synthroid 137 mcg tablet, Disp: , Rfl:     levothyroxine 125 mcg tablet, Take 125 mcg by mouth daily  125 mcg & 137 mcg alternate days  , Disp: , Rfl:     losartan (COZAAR) 50 mg tablet, TAKE 1 TABLET DAILY, Disp: 90 tablet, Rfl: 3    mometasone (NASONEX) 50 mcg/act nasal spray, mometasone 50 mcg/actuation nasal spray, Disp: , Rfl:     olopatadine (PATANOL) 0 1 % ophthalmic solution, olopatadine 0 1 % eye drops, Disp: , Rfl:     ORACEA 40 MG capsule, Take 40 mg by mouth daily, Disp: , Rfl: 3    pantoprazole (PROTONIX) 40 mg tablet, Take 20 mg by mouth daily , Disp: , Rfl:   Salicylic Acid 6 % CREA, salicylic acid 6 % topical cream, Disp: , Rfl:     urea (CARMOL) 40 %, urea 40 % topical cream, Disp: , Rfl:     ascorbic acid (VITAMIN C) 500 mg tablet, Vitamin C 500 mg tablet  TAKE 1 TABLET DAILY FOR 50 DAYS (Patient not taking: Reported on 7/13/2021), Disp: , Rfl:     Azelastine HCl 0 15 % SOLN, INSERT 2 SPRAYS 2 TIMES DAILY  (Patient not taking: Reported on 7/13/2021), Disp: , Rfl: 0    Calcium Ascorbate 500 MG TABS, TAKE 1 TABLET DAILY FOR 50 DAYS (Patient not taking: Reported on 7/13/2021), Disp: , Rfl:     chlorhexidine (PERIDEX) 0 12 % solution, RINSE 1/2 OUNCE TWICE A DAY FOR 30 SECONDS EXPECTORATE   NO NOT RINSE (Patient not taking: Reported on 7/13/2021), Disp: , Rfl: 0    fluticasone (FLONASE) 50 mcg/act nasal spray, fluticasone propionate 50 mcg/actuation nasal spray,suspension (Patient not taking: Reported on 7/13/2021), Disp: , Rfl:     labetalol (NORMODYNE) 100 mg tablet, , Disp: , Rfl:     Lidocaine Viscous HCl (XYLOCAINE) 2 % mucosal solution, USE 5 ML BY MOUTH 4 TIMES DAILY (BEFORE MEALS AND AT BEDTIME) GARGLE FOR THROAT PAIN  (Patient not taking: Reported on 7/13/2021), Disp: , Rfl: 0    loratadine (CLARITIN) 10 mg tablet, TAKE 1T ABLET BY MOUTH DAILY  (Patient not taking: Reported on 7/13/2021), Disp: , Rfl: 0  No current facility-administered medications for this visit  No Known Allergies    Objective:  /90 (BP Location: Left arm, Patient Position: Sitting, Cuff Size: Adult)   Pulse 87   Temp (!) 97 2 °F (36 2 °C)   Resp 17   Ht 5' 4" (1 626 m)   Wt 77 1 kg (170 lb)   LMP  (LMP Unknown)   SpO2 97%   BMI 29 18 kg/m²    Physical Exam  Constitutional:       General: She is not in acute distress  Appearance: She is well-developed  HENT:      Head: Normocephalic and atraumatic  Eyes:      General: No scleral icterus  Pupils: Pupils are equal, round, and reactive to light     Cardiovascular:      Rate and Rhythm: Normal rate and regular rhythm  Heart sounds: No murmur heard  Pulmonary:      Effort: Pulmonary effort is normal  No respiratory distress  Skin:     General: Skin is warm  Coloration: Skin is not pale  Findings: No rash  Neurological:      Mental Status: She is alert and oriented to person, place, and time  Psychiatric:         Thought Content: Thought content normal          Result Review  Labs:  Admission on 07/12/2021, Discharged on 07/12/2021   Component Date Value Ref Range Status    POC Glucose 07/12/2021 100  65 - 140 mg/dl Final    Sodium 07/12/2021 141  136 - 145 mmol/L Final    Potassium 07/12/2021 3 6  3 5 - 5 3 mmol/L Final    Chloride 07/12/2021 106  100 - 108 mmol/L Final    CO2 07/12/2021 25  21 - 32 mmol/L Final    ANION GAP 07/12/2021 10  4 - 13 mmol/L Final    BUN 07/12/2021 11  5 - 25 mg/dL Final    Creatinine 07/12/2021 0 67  0 60 - 1 30 mg/dL Final    Standardized to IDMS reference method    Glucose 07/12/2021 98  65 - 140 mg/dL Final    If the patient is fasting, the ADA then defines impaired fasting glucose as > 100 mg/dL and diabetes as > or equal to 123 mg/dL  Specimen collection should occur prior to Sulfasalazine administration due to the potential for falsely depressed results  Specimen collection should occur prior to Sulfapyridine administration due to the potential for falsely elevated results      Calcium 07/12/2021 8 7  8 3 - 10 1 mg/dL Final    eGFR 07/12/2021 95  ml/min/1 73sq m Final    WBC 07/12/2021 7 57  4 31 - 10 16 Thousand/uL Final    RBC 07/12/2021 4 60  3 81 - 5 12 Million/uL Final    Hemoglobin 07/12/2021 13 8  11 5 - 15 4 g/dL Final    Hematocrit 07/12/2021 42 0  34 8 - 46 1 % Final    MCV 07/12/2021 91  82 - 98 fL Final    MCH 07/12/2021 30 0  26 8 - 34 3 pg Final    MCHC 07/12/2021 32 9  31 4 - 37 4 g/dL Final    RDW 07/12/2021 12 8  11 6 - 15 1 % Final    Platelets 82/70/5794 302  149 - 390 Thousands/uL Final    MPV 07/12/2021 10 8  8 9 - 12 7 fL Final    Protime 07/12/2021 12 8  11 6 - 14 5 seconds Final    INR 07/12/2021 0 96  0 84 - 1 19 Final    Troponin I 07/12/2021 <0 02  <=0 04 ng/mL Final    [employer] Chemistry analyzer 99% cutoff is > 0 04 ng/mL in network labs     o cTnI 99% cutoff is useful only when applied to patients in the clinical setting of myocardial ischemia   o cTnI 99% cutoff should be interpreted in the context of clinical history, ECG findings and possibly cardiac imaging to establish correct diagnosis  o cTnI 99% cutoff may be suggestive but clearly not indicative of a coronary event without the clinical setting of myocardial ischemia   Ventricular Rate 07/12/2021 72  BPM Final    Atrial Rate 07/12/2021 72  BPM Final    [place] Interval 07/12/2021 164  ms Final    QRSD Interval 07/12/2021 106  ms Final    QT Interval 07/12/2021 390  ms Final    QTC Interval 07/12/2021 427  ms Final    P Axis 07/12/2021 55  degrees Final    QRS Axis 07/12/2021 77  degrees Final    T Wave Axis 07/12/2021 59  degrees Final     Please note: This report has been generated by a voice recognition software system  Therefore there may be syntax, spelling, and/or grammatical errors  Please call if you have any questions

## 2021-07-16 NOTE — TELEPHONE ENCOUNTER
You referred patient to see a neurologist but the earliest is in November  Patient is wondering if you could prescribe a medication that she can take when she gets migraines? Not something she wants tot take everyday but only when she gets a migraine? When she gets a migraine, she can't speak, and her vision is impaired  Pharmacy: [facility] [place] on [address]

## 2021-07-16 NOTE — TELEPHONE ENCOUNTER
Spoke with patient advised patient per Baldo Garzon PA-C patient wbc were a 7  Patients wbc on 7/9/21 at surgery were a t 4 7  DARRELL Tadeo states wbc were a 7 due to her migraine  Patient voiced understanding

## 2021-07-20 ENCOUNTER — TELEPHONE (OUTPATIENT)
Dept: NEUROLOGY | Facility: CLINIC | Age: 61
End: 2021-07-20

## 2021-07-21 ENCOUNTER — CONSULT (OUTPATIENT)
Dept: NEUROLOGY | Facility: CLINIC | Age: 61
End: 2021-07-21
Payer: COMMERCIAL

## 2021-07-21 VITALS
WEIGHT: 170 LBS | BODY MASS INDEX: 29.02 KG/M2 | SYSTOLIC BLOOD PRESSURE: 153 MMHG | DIASTOLIC BLOOD PRESSURE: 93 MMHG | HEART RATE: 75 BPM | HEIGHT: 64 IN

## 2021-07-21 DIAGNOSIS — G43.109 MIGRAINE WITH AURA AND WITHOUT STATUS MIGRAINOSUS, NOT INTRACTABLE: ICD-10-CM

## 2021-07-21 DIAGNOSIS — R47.89 WORD FINDING DIFFICULTY: ICD-10-CM

## 2021-07-21 DIAGNOSIS — R47.01 APHASIA: ICD-10-CM

## 2021-07-21 PROCEDURE — 99245 OFF/OP CONSLTJ NEW/EST HI 55: CPT | Performed by: PSYCHIATRY & NEUROLOGY

## 2021-07-21 PROCEDURE — 1036F TOBACCO NON-USER: CPT | Performed by: PSYCHIATRY & NEUROLOGY

## 2021-07-21 RX ORDER — ACETAMINOPHEN 500 MG
500 TABLET ORAL EVERY 6 HOURS PRN
COMMUNITY

## 2021-07-21 RX ORDER — LEVALBUTEROL TARTRATE 45 UG/1
2 AEROSOL, METERED ORAL EVERY 4 HOURS PRN
COMMUNITY

## 2021-07-21 RX ORDER — CYCLOSPORINE 0.5 MG/ML
EMULSION OPHTHALMIC
COMMUNITY
Start: 2021-07-15

## 2021-07-21 RX ORDER — METRONIDAZOLE 500 MG/1
500 TABLET ORAL 2 TIMES DAILY
COMMUNITY
Start: 2021-07-21 | End: 2021-07-28

## 2021-07-21 NOTE — PATIENT INSTRUCTIONS
Additional Testing:   Neurodiagnostic workup:  MRI Brain ordered    Headache/migraine treatment:   Abortive medications (for immediate treatment of a headache): It is ok to take ibuprofen, acetaminophen or naproxen (Advil, Tylenol,  Aleve, Excedrin) if they help your headaches you should limit these to No more than 3 times a week to avoid medication overuse/rebound headaches  For your more moderate to severe migraines take this medication early   Sumatriptan/imitrex 50mg tabs - take one at the onset of headache  May repeat one time after 1-2 hours if pain has not resolved  (Max 200 mg a day and 9 a month)       Over the counter preventive supplements for headaches/migraines (if you try, try for 3 months straight)  (to take every day to help prevent headaches - not to take at the time of headache):  (there are combo pills online of these) - preventa migraine, migra leaf, mind ease   [] Magnesium 400mg daily (If any diarrhea or upset stomach, decrease dose  as tolerated)  [] Riboflavin (Vitamin B2) 400mg daily- try online   (FYI B2 may make your urine bright/neon yellow)  AND/OR  [] Herbal medication: Petasites/Butterbur 150 mg daily - try online  (When choosing your Butterbur online or in the store, beware that there are some poor preps containing pyrrolizidine alkaloids (PAs) that can be harmful to the liver  Therefore, do not use butterbur products that are not labeled as PA-free )    Prescription preventive medications for headaches/migraines   (to take every day to help prevent headaches - not to take at the time of headache):  [x] we have options       *Typically these types of medications take time untill you see the benefit, although some may see improvement in days, often it may take weeks, especially if the medication is being titrated up to a beneficial level  Please contact us if there are any concerns or questions regarding the medication       Lifestyle Recommendations:  [x] SLEEP - Maintain a regular sleep schedule: Adults need at least 7-8 hours of uninterrupted a night  Maintain good sleep hygiene:  Going to bed and waking up at consistent times, avoiding excessive daytime naps, avoiding caffeinated beverages in the evening, avoid excessive stimulation in the evening and generally using bed primarily for sleeping  One hour before bedtime would recommend turning lights down lower, decreasing your activity (may read quietly, listen to music at a low volume)  When you get into bed, should eliminate all technology (no texting, emailing, playing with your phone, iPad or tablet in bed)  [x] HYDRATION - Maintain good hydration  Drink  2L of fluid a day (4 typical small water bottles)  [x] DIET - Maintain good nutrition  In particular don't skip meals and try and eat healthy balanced meals regularly  [x] TRIGGERS - Look for other triggers and avoid them: Limit caffeine to 1-2 cups a day or less  Avoid dietary triggers that you have noticed bring on your headaches (this could include aged cheese, peanuts, MSG, aspartame and nitrates)  [x] EXERCISE - physical exercise as we all know is good for you in many ways, and not only is good for your heart, but also is beneficial for your mental health, cognitive health and  chronic pain/headaches  I would encourage at the least 5 days of physical exercise weekly for at least 30 minutes  Education and Follow-up  [x] Please call with any questions or concerns  Of course if any new concerning symptoms go to the emergency department    [x] Follow up 6-8 weeks to follow up MRI     - As we discussed if there are no abnormalities on the brain MRI that need urgent intervention (very often there are incidental findings that may not mean anything significant and are better discussed in person), you will not necessarily receive a call from us, but feel free to call in to check on the status of the report (since not knowing can be anxiety provoking) and the nurses will let you know the result or make sure I have nothing to pass on regarding the results first   Ideally, all of this will be discussed in detail in the follow-up visit

## 2021-07-21 NOTE — PROGRESS NOTES
[facility] [facility] Consult  PATIENT:  Helen Powell  MRN:  [id number]  :  1960  DATE OF SERVICE:  2021  REFERRED BY: Jose Francisco Rico MD  PMD: Natalie Chou MD    Assessment/Plan:     Helen Powell is a delightful 63 y o  female with a past medical history that includes Migraine with aura, Right carpal tunnel syndrome, Asthma, leiomyoma of uterus, chronic left knee pain, stress incontinence, neutropenia, peroneal tendinitis,  Hypothyroidism following hasiomotos, vitamin-D deficiency, anxiety, depression,  status post hysterectomy 2021, seasonal allergies  referred here for evaluation of headache  My initial evaluation 2021     Migraine with aura and without status migrainosus, not intractable  Word finding difficulty  She reports a history of headaches and migraines dating back to her 35s that have been infrequent through the years and often hormonal related  She reports typical associated migrainous features without unilateral autonomic features  Often would have a visual aura with zigzag or blocking out of vision lasting up to an hour before headache in the past   Only once in the remote past about 18 years ago had trouble forming sentences and finding words,  Up until this happened again twice in the past month  She had significant improvement in migraines following menopause 5-6 years ago when she had her last migraine that she recalls  She had a hysterectomy and removal of ovaries 2021  -   As of 2021:  On 2021 had visual aura and speech symptoms with migraine  And 2021 migraine with visual aura and word-finding difficulty lasting approximately 30 minutes and resolving spontaneously with continued migraine  For this, she was evaluated in the emergency department where CTA head and neck were unremarkable for vascular abnormality or aneurysm or other pathology    She was referred to Neurology due to the recent increase with return of the language symptoms  We discussed this does sound likely migraine with aura, possibly related to stress or hormonal changes from recent surgery  Workup:  -  CTA head and neck with without contrast 07/12/2021: CT brain: No acute intracranial abnormality  CT angiography: No stenosis, occlusion or thrombosis of the cervical or intracranial vasculature  - due to increased frequency and severity of headaches and migraines, new focal neurologic symptoms I recommend further evaluation with MRI brain with without contrast to rule out structural or treatable causes of symptoms     Preventative:  - we discussed headache hygiene and lifestyle factors that may improve headaches  -  Prescription preventative not indicated at this time, discussed with patient if she would like to try something she could always try headache preventive supplements first for three-month trial  - Past/ failed/contraindicated:  Losartan, amitriptyline, fluoxetine, Wellbutrin side effects, propranolol would be contraindicated due to history of asthma  - future options:  Topiramate, CGRP med     Abortive:  - discussed not taking over-the-counter or prescription pain medications more than 3 days per week to prevent medication overuse/rebound headache  -  Currently on through other providers: Sumatriptan 50 mg  Discussed proper use, possible side effects and risks  - Past/ failed/contraindicated:  none  - future options: Alternative Triptan, prochlorperazine, Toradol IM or p o , could consider trial for 5 days of Depakote or dexamethasone 4 prolonged migraine, ubrelvy, reyvow, nurtec        Patient instructions      Additional Testing:   Neurodiagnostic workup:  MRI Brain ordered    Headache/migraine treatment:   Abortive medications (for immediate treatment of a headache):    It is ok to take ibuprofen, acetaminophen or naproxen (Advil, Tylenol,  Aleve, Excedrin) if they help your headaches you should limit these to No more than 3 times a week to avoid medication overuse/rebound headaches  For your more moderate to severe migraines take this medication early   Sumatriptan/imitrex 50mg tabs - take one at the onset of headache  May repeat one time after 1-2 hours if pain has not resolved  (Max 200 mg a day, 3 days a week and 9 days a month)       Over the counter preventive supplements for headaches/migraines (if you try, try for 3 months straight)  (to take every day to help prevent headaches - not to take at the time of headache):  (there are combo pills online of these) - preventa migraine, migra leaf, mind ease   [] Magnesium 400mg daily (If any diarrhea or upset stomach, decrease dose  as tolerated)  [] Riboflavin (Vitamin B2) 400mg daily- try online   (FYI B2 may make your urine bright/neon yellow)  AND/OR  [] Herbal medication: Petasites/Butterbur 150 mg daily - try online  (When choosing your Butterbur online or in the store, beware that there are some poor preps containing pyrrolizidine alkaloids (PAs) that can be harmful to the liver  Therefore, do not use butterbur products that are not labeled as PA-free )    Prescription preventive medications for headaches/migraines   (to take every day to help prevent headaches - not to take at the time of headache):  [x] we have options if needed       *Typically these types of medications take time untill you see the benefit, although some may see improvement in days, often it may take weeks, especially if the medication is being titrated up to a beneficial level  Please contact us if there are any concerns or questions regarding the medication  Lifestyle Recommendations:  [x] SLEEP - Maintain a regular sleep schedule: Adults need at least 7-8 hours of uninterrupted a night   Maintain good sleep hygiene:  Going to bed and waking up at consistent times, avoiding excessive daytime naps, avoiding caffeinated beverages in the evening, avoid excessive stimulation in the evening and generally using bed primarily for sleeping  One hour before bedtime would recommend turning lights down lower, decreasing your activity (may read quietly, listen to music at a low volume)  When you get into bed, should eliminate all technology (no texting, emailing, playing with your phone, iPad or tablet in bed)  [x] HYDRATION - Maintain good hydration  Drink  2L of fluid a day (4 typical small water bottles)  [x] DIET - Maintain good nutrition  In particular don't skip meals and try and eat healthy balanced meals regularly  [x] TRIGGERS - Look for other triggers and avoid them: Limit caffeine to 1-2 cups a day or less  Avoid dietary triggers that you have noticed bring on your headaches (this could include aged cheese, peanuts, MSG, aspartame and nitrates)  [x] EXERCISE - physical exercise as we all know is good for you in many ways, and not only is good for your heart, but also is beneficial for your mental health, cognitive health and  chronic pain/headaches  I would encourage at the least 5 days of physical exercise weekly for at least 30 minutes  Education and Follow-up  [x] Please call with any questions or concerns  Of course if any new concerning symptoms go to the emergency department  [x] Follow up 6-8 weeks to follow up MRI     - As we discussed if there are no abnormalities on the brain MRI that need urgent intervention (very often there are incidental findings that may not mean anything significant and are better discussed in person), you will not necessarily receive a call from us, but feel free to call in to check on the status of the report (since not knowing can be anxiety provoking) and the nurses will let you know the result or make sure I have nothing to pass on regarding the results first   Ideally, all of this will be discussed in detail in the follow-up visit  CC:   We had the pleasure of evaluating Prudence Party in neurological consultation today   Prudence Party is a right handed female who presents today for evaluation of headaches  History obtained from patient as well as available medical record review  History of Present Illness:   Current medical illnesses  or past medical history include  Right carpal tunnel syndrome, Asthma, leiomyoma of uterus, chronic left knee pain, stress incontinence, neutropenia, peroneal tendinitis,  Hypothyroidism following hasiomotos, vitamin-D deficiency, anxiety, depression,  Migraines, status post hysterectomy June 28th 2021, seasonal allergies       Had one 7/8/21 with vision and speech symptoms     On 07/12/2021, seen in ED for  Migraine with visual aura and word-finding difficulty  -  Reporting a couple of episodes in the week prior of visual aura  Of sparkling lights followed by onset of headache and word-finding difficulty  Most recently happened after allergy shots 07/12/2021 were visual aura and word-finding difficulty last about 30 minutes before resolving spontaneously  Headache continued into ED visit  Reported hysterectomy 1 week prior   -   CTA head and neck with without contrast was unremarkable for acute pathology  -  Visual acuity  20-40 bilaterally neuro exam unremarkable  Migraine cocktail declined by patient has headache had resolved      Headaches started at what age? 38years old - first when pregnant with first child   How often do the headaches occur?   -  18 years ago - had word finding difficulty   - periodically mild headache and visual aura, 1-2 times a year   - 6 years ago had last migraine (menopause 5 years ago)  - as of 7/21/2021: 2 this month within a week of each other   What time of the day do the headaches start? No particular time of day   How long do the headaches last? Maybe an hour or more  Are you ever headache free?  Yes    Aura? with aura   - more often visual aura - up to an hour, zig zag or blocking of vision, either side/eye  - speech 18 years ago and twice this time - trouble forming sentences and findings words      Last eye exam: last week 7/2021, everything normal except for rosacea and mole by ophthalmology    Where is your headache located and pain quality?   - apex or midfrontal sinus  - dull achy  What is the intensity of pain? Average: 5/10, worst 8-9/10  Associated symptoms:   [x] Nausea       [] Vomiting        [x] Decreased appetite  [x] Problems with concentration   [x] Photophobia >    [x]Phonophobia      [x] Osmophobia  [] Blurred vision   [x] Prefer quiet, dark room  [] Light-headed or dizzy     [] Tinnitus   [x] Hands or feet tingle or feel numb/paresthesias       [] Ptosis      [] Facial droop  [] Lacrimation  [x] Nasal congestion    Things that make the headache worse? No specific movements    Headache triggers:  Bright lights, barometric, stress, hormones     Have you seen someone else for headaches or pain? Yes, PCP, ophthalmology  Have you had trigger point injection performed and how often? No  Have you had Botox injection performed and how often? No   Have you had epidural injections or transforaminal injections performed? No  Are you current pregnant or planning on getting pregnant? Hysterectomy and oopherectomy   Have you ever had any Brain imaging? no    What medications do you take or have you taken for your headaches?    ABORTIVE:    OTC medications have been ineffective      sumatriptan 50 mg - would help in the past     Past  None     PREVENTIVE:        for BP: Losartan   for mood: Xanax    Past/ failed/contraindicated:  Propranolol would be contraindicated due to history of asthma  Amitriptyline  Fluoxetine for a long time  wellbutrin       LIFESTYLE  Sleep   - averages: 8-9 hours   Problems falling asleep?:   No  Problems staying asleep?:  No      Water: 6-8 glasses per day  Caffeine: 1 cup coffee per day    Mood:  anxiety and depression - much better     The following portions of the patient's history were reviewed and updated as appropriate: allergies, current medications, past family history, past medical history, past social history, past surgical history and problem list     Pertinent family history:  Family history of headaches:  no known family members with significant headaches  Any family history of aneurysms - No  Both parents strokes dad in [de-identified], mom in 80s, lived to 80s   Dad had black mass on brain     Pertinent social history:  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     Illicit Drugs: denies  Alcohol/tobacco: Denies alcohol use, Denies tobacco use    Past Medical History:     Past Medical History:   Diagnosis Date    Allergic rhinitis     Asthma     Disease of thyroid gland     Hashimoto's    Fibroid     Hypertension     Hypothyroidism     Primary osteoarthritis of knee     Left - last assessed: Aug 23, 2017    Seasonal allergies     Varicella        Patient Active Problem List   Diagnosis    Asthma    Leiomyoma of uterus    Chronic pain of left knee    Prolapse of anterior vaginal wall    Stress incontinence in female    Neutropenia, unspecified (HCC)       Medications:      Current Outpatient Medications   Medication Sig Dispense Refill    acetaminophen (TYLENOL) 500 mg tablet Take 500 mg by mouth every 6 (six) hours as needed      ALPRAZolam (XANAX) 0 25 mg tablet TAKE 2 TABLETS DAILY FOR ANXIETY  2    ASMANEX 30 METERED DOSES 110 MCG/INH AEPB inhaler Inhale 1 puff daily  1    Brimonidine Tartrate (MIRVASO) 0 33 % GEL Mirvaso 0 33 % topical gel      Cholecalciferol (VITAMIN D-3) 5000 units TABS       diclofenac sodium (Voltaren) 1 % Voltaren 1 % topical gel   APPLY 2 GRAM TO THE AFFECTED AREA(S) BY TOPICAL ROUTE 4 TIMES PER DAY      EPINEPHrine (EPIPEN 2-PHOENIX) 0 3 mg/0 3 mL SOAJ EpiPen 2-[place] 0 3 mg/0 3 mL injection, auto-injector      Fexofenadine HCl (ALLEGRA PO) take 2 tablet (60MG)  by oral route 2 times every day      levalbuterol (XOPENEX HFA) 45 mcg/act inhaler Inhale 2 puffs every 4 (four) hours as needed      levothyroxine (SYNTHROID) 137 mcg tablet Synthroid 137 mcg tablet      levothyroxine 125 mcg tablet Take 125 mcg by mouth daily  125 mcg & 137 mcg alternate days   losartan (COZAAR) 50 mg tablet TAKE 1 TABLET DAILY 90 tablet 3    metroNIDAZOLE (FLAGYL) 500 mg tablet Take 500 mg by mouth 2 (two) times a day      mometasone (NASONEX) 50 mcg/act nasal spray mometasone 50 mcg/actuation nasal spray      olopatadine (PATANOL) 0 1 % ophthalmic solution olopatadine 0 1 % eye drops      Restasis 0 05 % ophthalmic emulsion       Salicylic Acid 6 % CREA salicylic acid 6 % topical cream      urea (CARMOL) 40 % urea 40 % topical cream      ascorbic acid (VITAMIN C) 500 mg tablet Vitamin C 500 mg tablet   TAKE 1 TABLET DAILY FOR 50 DAYS (Patient not taking: Reported on 7/13/2021)      Azelastine HCl 0 15 % SOLN INSERT 2 SPRAYS 2 TIMES DAILY  (Patient not taking: Reported on 7/13/2021)  0    Calcium Ascorbate 500 MG TABS TAKE 1 TABLET DAILY FOR 50 DAYS (Patient not taking: Reported on 7/13/2021)      chlorhexidine (PERIDEX) 0 12 % solution RINSE 1/2 OUNCE TWICE A DAY FOR 30 SECONDS EXPECTORATE   NO NOT RINSE (Patient not taking: Reported on 7/13/2021)  0    estradiol (ESTRACE VAGINAL) 0 1 mg/g vaginal cream INSERT ONE GRAM DAILY VAGINALLY AT NIGHT FOR 14 DAYS, THEN TWICE WEEKLY FOR MAINTENANCE   fluticasone (FLONASE) 50 mcg/act nasal spray fluticasone propionate 50 mcg/actuation nasal spray,suspension (Patient not taking: Reported on 7/13/2021)      labetalol (NORMODYNE) 100 mg tablet  (Patient not taking: Reported on 7/13/2021)      Lidocaine Viscous HCl (XYLOCAINE) 2 % mucosal solution USE 5 ML BY MOUTH 4 TIMES DAILY (BEFORE MEALS AND AT BEDTIME) GARGLE FOR THROAT PAIN  (Patient not taking: Reported on 7/13/2021)  0    loratadine (CLARITIN) 10 mg tablet TAKE 1T ABLET BY MOUTH DAILY   (Patient not taking: Reported on 7/13/2021)  0    ORACEA 40 MG capsule Take 40 mg by mouth daily  3    SUMAtriptan (IMITREX) 50 mg tablet Take 1 tablet (50 mg total) by mouth once as needed for migraine for up to 1 dose (Patient not taking: Reported on 7/21/2021) 9 tablet 0     No current facility-administered medications for this visit  Allergies: Allergies   Allergen Reactions    Other Allergic Rhinitis    Pollen Extract-Tree Extract [Pollen Extract] Allergic Rhinitis       Family History:     Family History   Problem Relation Age of Onset    Hypertension Mother     Hyperlipidemia Mother     Skin cancer Mother     Stroke Mother     Diabetes Father     Heart attack Father     Stroke Father     Skin cancer Father     Breast cancer Maternal Aunt         > 48    Colon cancer Maternal Grandmother         >50    No Known Problems Daughter     No Known Problems Maternal Aunt     No Known Problems Maternal Aunt     Hyperlipidemia Family     Hypertension Family     Heart disease Family        Social History:       Social History     Socioeconomic History    Marital status:      Spouse name: Not on file    Number of children: Not on file    Years of education: Not on file    Highest education level: Not on file   Occupational History    Not on file   Tobacco Use    Smoking status: Never Smoker    Smokeless tobacco: Never Used   Vaping Use    Vaping Use: Never used   Substance and Sexual Activity    Alcohol use: Yes    Drug use: No    Sexual activity: Not Currently     Birth control/protection: Post-menopausal   Other Topics Concern    Not on file   Social History Narrative    [facility]:  [facility]     Social Determinants of Health     Financial Resource Strain:     Difficulty of Paying Living Expenses:    Food Insecurity:     Worried About Running Out of Food in the Last Year:     [address] in the Last Year:    Transportation Needs:     Lack of Transportation (Medical):      Lack of Transportation (Non-Medical):    Physical Activity:     Days of Exercise per Week:     Minutes of Exercise per Session:    Stress:     Feeling of Stress :    Social Connections:     Frequency of Communication with Friends and Family:     Frequency of Social Gatherings with Friends and Family:     Attends Denominational Services:     Active Member of Clubs or Organizations:     Attends Club or Organization Meetings:     Marital Status:    Intimate Partner Violence:     Fear of Current or Ex-Partner:     Emotionally Abused:     Physically Abused:     Sexually Abused:          Objective:       Physical Exam:                                                                 Vitals:            Constitutional:    /93 (BP Location: Left arm, Patient Position: Sitting, Cuff Size: Standard)   Pulse 75   Ht 5' 4" (1 626 m)   Wt 77 1 kg (170 lb)   LMP  (LMP Unknown)   BMI 29 18 kg/m²   BP Readings from Last 3 Encounters:   07/21/21 153/93   07/16/21 140/90   07/15/21 129/81     Pulse Readings from Last 3 Encounters:   07/21/21 75   07/16/21 87   07/15/21 72         Well developed, well nourished, well groomed  No dysmorphic features  HEENT:  Normocephalic atraumatic  Oropharynx is clear and moist  No oral mucosal lesions  Chest:  Respirations regular and unlabored  Cardiovascular:  Distal extremities warm without palpable edema or tenderness, no observed significant swelling  Musculoskeletal:  (see below under neurologic exam for evaluation of motor function and gait)   Skin:  warm and dry, not diaphoretic  No apparent birthmarks or stigmata of neurocutaneous disease  Psychiatric:  Normal behavior and appropriate affect        Neurological Examination:     Mental status/cognitive function:   Recent and remote memory intact  Attention span and concentration as well as fund of knowledge are appropriate for age  Normal language and spontaneous speech  Cranial Nerves:  II-visual fields full     Fundi poorly visualized due to pupillary constriction  III, IV, VI-Pupils were equal, round, and reactive to light and accomodation  Extraocular movements were full and conjugate without nystagmus  V-facial sensation symmetric  VII-facial expression symmetric, intact forehead wrinkle, strong eye closure, symmetric smile    VIII-hearing grossly intact bilaterally   IX, X-palate elevation symmetric, no dysarthria  XI-shoulder shrug strength intact    XII-tongue protrusion midline  Motor Exam: symmetric bulk and tone throughout, no pronator drift  Power/strength 5/5 bilateral upper and lower extremities, no atrophy, fasciculations or abnormal movements noted  Sensory: grossly intact light touch in all extremities  Reflexes: brachioradialis 2+, biceps 2+, knee 2+, ankle 2+ bilaterally  No ankle clonus  Coordination: Finger nose finger intact bilaterally, no apparent dysmetria, ataxia or tremor noted  Gait: steady casual and tandem gait  Pertinent lab results:   TSH checked outside of [facility] every 6 months   07/12/2021 BMP and CBC unremarkable  07/24/2020 LFTs normal     Copper 91  06/18/2015 vitamin-D 33 - takes 2000 units daily     EKG 07/12/2021 normal sinus rhythm, normal EKG, rate 72,      Imaging:   -  CTA head and neck with without contrast 07/12/2021:   CT brain: No acute intracranial abnormality  CT angiography: No stenosis, occlusion or thrombosis of the cervical or intracranial vasculature  Review of Systems:   ROS obtained by medical assistant Personally reviewed and updated if indicated  Review of Systems   Constitutional: Negative  Negative for appetite change and fever  HENT: Negative  Negative for hearing loss, tinnitus, trouble swallowing and voice change  Eyes: Negative  Negative for photophobia and pain  Respiratory: Negative  Negative for shortness of breath  Cardiovascular: Negative  Negative for palpitations  Gastrointestinal: Negative  Negative for nausea and vomiting  Endocrine: Negative  Negative for cold intolerance  Genitourinary: Negative  Negative for dysuria, frequency and urgency  Musculoskeletal: Negative  Negative for myalgias and neck pain  Skin: Negative  Negative for rash  Neurological: Positive for headaches  Negative for dizziness, tremors, seizures, syncope, facial asymmetry, weakness, light-headedness and numbness  Hematological: Negative  Does not bruise/bleed easily  Psychiatric/Behavioral: Negative  Negative for confusion, hallucinations and sleep disturbance  I have spent 61 minutes with Patient today in which greater than 50% of this time was spent in counseling/coordination of care regarding Diagnostic results, Prognosis, Risks and benefits of tx options, Intructions for management, Patient and family education, Importance of tx compliance, Risk factor reductions and Impressions  I also spent 12 minutes non face to face for this patient the same day           Author:  Olga Ace MD 7/21/2021 3:47 PM

## 2021-07-21 NOTE — PROGRESS NOTES
Review of Systems   Constitutional: Negative  Negative for appetite change and fever  HENT: Negative  Negative for hearing loss, tinnitus, trouble swallowing and voice change  Eyes: Negative  Negative for photophobia and pain  Respiratory: Negative  Negative for shortness of breath  Cardiovascular: Negative  Negative for palpitations  Gastrointestinal: Negative  Negative for nausea and vomiting  Endocrine: Negative  Negative for cold intolerance  Genitourinary: Negative  Negative for dysuria, frequency and urgency  Musculoskeletal: Negative  Negative for myalgias and neck pain  Skin: Negative  Negative for rash  Neurological: Positive for speech difficulty and headaches (2 per week)  Negative for dizziness, tremors, seizures, syncope, facial asymmetry, weakness, light-headedness and numbness  Hematological: Negative  Does not bruise/bleed easily  Psychiatric/Behavioral: Negative  Negative for confusion, hallucinations and sleep disturbance

## 2021-07-22 ENCOUNTER — PROCEDURE VISIT (OUTPATIENT)
Dept: OBGYN CLINIC | Facility: HOSPITAL | Age: 61
End: 2021-07-22
Payer: COMMERCIAL

## 2021-07-22 VITALS
HEIGHT: 64 IN | SYSTOLIC BLOOD PRESSURE: 126 MMHG | WEIGHT: 170 LBS | DIASTOLIC BLOOD PRESSURE: 87 MMHG | BODY MASS INDEX: 29.02 KG/M2 | HEART RATE: 90 BPM

## 2021-07-22 DIAGNOSIS — G89.29 CHRONIC PAIN OF LEFT KNEE: ICD-10-CM

## 2021-07-22 DIAGNOSIS — M17.12 PRIMARY OSTEOARTHRITIS OF LEFT KNEE: Primary | ICD-10-CM

## 2021-07-22 DIAGNOSIS — M25.562 CHRONIC PAIN OF LEFT KNEE: ICD-10-CM

## 2021-07-22 PROCEDURE — 20610 DRAIN/INJ JOINT/BURSA W/O US: CPT | Performed by: ORTHOPAEDIC SURGERY

## 2021-07-22 RX ORDER — HYALURONATE SODIUM 10 MG/ML
20 SYRINGE (ML) INTRAARTICULAR
Status: COMPLETED | OUTPATIENT
Start: 2021-07-22 | End: 2021-07-22

## 2021-07-22 RX ADMIN — Medication 20 MG: at 17:06

## 2021-07-22 NOTE — PROGRESS NOTES
Assessment:   Diagnosis ICD-10-CM Associated Orders   1  Primary osteoarthritis of left knee  M17 12 Large joint arthrocentesis: L knee   2  Chronic pain of left knee  M25 562 Large joint arthrocentesis: L knee    G89 29        Plan:    Left knee known osteoarthritis  Patient's left knee was injected with the 2nd of 3 Euflexxa injections  Patient tolerated procedure well  Ice and post injection protocol advised  Weightbearing activities as tolerated  She will follow up next week to complete the Visco series to her left knee  To do next visit:  Return in about 1 week (around 7/29/2021) for re-check and Euflexxa #3 left knee  The above stated was discussed in layman's terms and the patient expressed understanding  All questions were answered to the patient's satisfaction  Scribe Attestation    I,:  Ramez Brown am acting as a scribe while in the presence of the attending physician :       I,:  Seb Schulte MD personally performed the services described in this documentation    as scribed in my presence :             Subjective:   Marcy Rojo is a 63 y o  female who presents  today for repeat evaluation of her left knee known osteoarthritis and 2nd Euflexxa in the series of 3  She had tolerated last week's initial injection well and has already noted some improvement of her weight-bearing symptoms as well as improved ability to ambulate stairs  She has no indication not to proceed with today's 2nd Visco injection        Review of systems negative unless otherwise specified in HPI  Review of Systems    Past Medical History:   Diagnosis Date    Allergic rhinitis     Asthma     Disease of thyroid gland     Hashimoto's    Fibroid     Hypertension     Hypothyroidism     Primary osteoarthritis of knee     Left - last assessed: Aug 23, 2017    Seasonal allergies     Varicella        Past Surgical History:   Procedure Laterality Date    APPENDECTOMY      COLONOSCOPY  06/30/2014   [facility] DI[facility]NOSTIC LAPAROSCOPY      HAND SURGERY Left     MAMMO (HISTORICAL)  11/26/2016    THYROIDECTOMY  1998    partial       Family History   Problem Relation Age of Onset    Hypertension Mother     Hyperlipidemia Mother     Skin cancer Mother     Stroke Mother     Diabetes Father     Heart attack Father     Stroke Father     Skin cancer Father     Breast cancer Maternal Aunt         > 48    Colon cancer Maternal Grandmother         >50    No Known Problems Daughter     No Known Problems Maternal Aunt     No Known Problems Maternal Aunt     Hyperlipidemia Family     Hypertension Family     Heart disease Family        Social History     Occupational History    Not on file   Tobacco Use    Smoking status: Never Smoker    Smokeless tobacco: Never Used   Vaping Use    Vaping Use: Never used   Substance and Sexual Activity    Alcohol use:  Yes    Drug use: No    Sexual activity: Not Currently     Birth control/protection: Post-menopausal         Current Outpatient Medications:     acetaminophen (TYLENOL) 500 mg tablet, Take 500 mg by mouth every 6 (six) hours as needed, Disp: , Rfl:     ALPRAZolam (XANAX) 0 25 mg tablet, TAKE 2 TABLETS DAILY FOR ANXIETY, Disp: , Rfl: 2    ASMANEX 30 METERED DOSES 110 MCG/INH AEPB inhaler, Inhale 1 puff daily, Disp: , Rfl: 1    Brimonidine Tartrate (MIRVASO) 0 33 % GEL, Mirvaso 0 33 % topical gel, Disp: , Rfl:     Cholecalciferol (VITAMIN D-3) 5000 units TABS, , Disp: , Rfl:     diclofenac sodium (Voltaren) 1 %, Voltaren 1 % topical gel  APPLY 2 GRAM TO THE AFFECTED AREA(S) BY TOPICAL ROUTE 4 TIMES PER DAY, Disp: , Rfl:     EPINEPHrine (EPIPEN 2-PHOENIX) 0 3 mg/0 3 mL SOAJ, EpiPen 2-Phoenix 0 3 mg/0 3 mL injection, auto-injector, Disp: , Rfl:     estradiol (ESTRACE VAGINAL) 0 1 mg/g vaginal cream, INSERT ONE GRAM DAILY VAGINALLY AT NIGHT FOR 14 DAYS, THEN TWICE WEEKLY FOR MAINTENANCE , Disp: , Rfl:     Fexofenadine HCl (ALLEGRA PO), take 2 tablet (60MG)  by oral route 2 times every day, Disp: , Rfl:     levalbuterol (XOPENEX HFA) 45 mcg/act inhaler, Inhale 2 puffs every 4 (four) hours as needed, Disp: , Rfl:     levothyroxine (SYNTHROID) 137 mcg tablet, Synthroid 137 mcg tablet, Disp: , Rfl:     levothyroxine 125 mcg tablet, Take 125 mcg by mouth daily  125 mcg & 137 mcg alternate days  , Disp: , Rfl:     losartan (COZAAR) 50 mg tablet, TAKE 1 TABLET DAILY, Disp: 90 tablet, Rfl: 3    metroNIDAZOLE (FLAGYL) 500 mg tablet, Take 500 mg by mouth 2 (two) times a day, Disp: , Rfl:     mometasone (NASONEX) 50 mcg/act nasal spray, mometasone 50 mcg/actuation nasal spray, Disp: , Rfl:     olopatadine (PATANOL) 0 1 % ophthalmic solution, olopatadine 0 1 % eye drops, Disp: , Rfl:     ORACEA 40 MG capsule, Take 40 mg by mouth daily, Disp: , Rfl: 3    Restasis 0 05 % ophthalmic emulsion, , Disp: , Rfl:     Salicylic Acid 6 % CREA, salicylic acid 6 % topical cream, Disp: , Rfl:     urea (CARMOL) 40 %, urea 40 % topical cream, Disp: , Rfl:     ascorbic acid (VITAMIN C) 500 mg tablet, Vitamin C 500 mg tablet  TAKE 1 TABLET DAILY FOR 50 DAYS (Patient not taking: Reported on 7/13/2021), Disp: , Rfl:     Azelastine HCl 0 15 % SOLN, INSERT 2 SPRAYS 2 TIMES DAILY  (Patient not taking: Reported on 7/13/2021), Disp: , Rfl: 0    Calcium Ascorbate 500 MG TABS, TAKE 1 TABLET DAILY FOR 50 DAYS (Patient not taking: Reported on 7/13/2021), Disp: , Rfl:     chlorhexidine (PERIDEX) 0 12 % solution, RINSE 1/2 OUNCE TWICE A DAY FOR 30 SECONDS EXPECTORATE    NO NOT RINSE (Patient not taking: Reported on 7/13/2021), Disp: , Rfl: 0    fluticasone (FLONASE) 50 mcg/act nasal spray, fluticasone propionate 50 mcg/actuation nasal spray,suspension (Patient not taking: Reported on 7/13/2021), Disp: , Rfl:     labetalol (NORMODYNE) 100 mg tablet, , Disp: , Rfl:     Lidocaine Viscous HCl (XYLOCAINE) 2 % mucosal solution, USE 5 ML BY MOUTH 4 TIMES DAILY (BEFORE MEALS AND AT BEDTIME) GARGLE FOR THROAT PAIN  (Patient not taking: Reported on 7/13/2021), Disp: , Rfl: 0    loratadine (CLARITIN) 10 mg tablet, TAKE 1T ABLET BY MOUTH DAILY  (Patient not taking: Reported on 7/13/2021), Disp: , Rfl: 0    SUMAtriptan (IMITREX) 50 mg tablet, Take 1 tablet (50 mg total) by mouth once as needed for migraine for up to 1 dose (Patient not taking: Reported on 7/21/2021), Disp: 9 tablet, Rfl: 0    Allergies   Allergen Reactions    Other Allergic Rhinitis    Pollen Extract-Tree Extract [Pollen Extract] Allergic Rhinitis            Vitals:    07/22/21 1643   BP: 126/87   Pulse: 90       Objective:                    Left Knee Exam     Muscle Strength   The patient has normal left knee strength  Tenderness   The patient is experiencing tenderness in the medial joint line  Range of Motion   The patient has normal left knee ROM  Left knee flexion: with less crepitation  Other   Erythema: absent  Sensation: normal  Swelling: mild  Effusion: no effusion present    Comments:    Mild varus alignment      No indication not to proceed with today's 2nd Visco injection  Diagnostics, reviewed and taken today if performed as documented:    None performed          Procedures, if performed today:    Large joint arthrocentesis: L knee  Universal Protocol:  Consent: Verbal consent obtained  Risks and benefits: risks, benefits and alternatives were discussed  Consent given by: patient  Time out: Immediately prior to procedure a "time out" was called to verify the correct patient, procedure, equipment, support staff and site/side marked as required    Timeout called at: 7/22/2021 5:04 PM   Patient understanding: patient states understanding of the procedure being performed  Site marked: the operative site was marked  Patient identity confirmed: verbally with patient    Supporting Documentation  Indications: pain and diagnostic evaluation   Procedure Details  Location: knee - L knee  Preparation: Patient was prepped and draped in the usual sterile fashion  Needle size: 22 G  Ultrasound guidance: no  Approach: anteromedial  Medications administered: 20 mg Sodium Hyaluronate 20 MG/2ML  Specialty Pharmacy Supplied: received medications from pharmacy  Patient tolerance: patient tolerated the procedure well with no immediate complications  Dressing:  Sterile dressing applied             Portions of the record may have been created with voice recognition software  Occasional wrong word or "sound a like" substitutions may have occurred due to the inherent limitations of voice recognition software  Read the chart carefully and recognize, using context, where substitutions have occurred

## 2021-07-29 ENCOUNTER — PROCEDURE VISIT (OUTPATIENT)
Dept: OBGYN CLINIC | Facility: HOSPITAL | Age: 61
End: 2021-07-29
Payer: COMMERCIAL

## 2021-07-29 VITALS
BODY MASS INDEX: 29.02 KG/M2 | HEART RATE: 91 BPM | HEIGHT: 64 IN | SYSTOLIC BLOOD PRESSURE: 150 MMHG | WEIGHT: 170 LBS | DIASTOLIC BLOOD PRESSURE: 92 MMHG

## 2021-07-29 DIAGNOSIS — G89.29 CHRONIC PAIN OF LEFT KNEE: ICD-10-CM

## 2021-07-29 DIAGNOSIS — M25.562 CHRONIC PAIN OF LEFT KNEE: ICD-10-CM

## 2021-07-29 DIAGNOSIS — M17.12 PRIMARY OSTEOARTHRITIS OF LEFT KNEE: Primary | ICD-10-CM

## 2021-07-29 PROCEDURE — 3008F BODY MASS INDEX DOCD: CPT | Performed by: PSYCHIATRY & NEUROLOGY

## 2021-07-29 PROCEDURE — 20610 DRAIN/INJ JOINT/BURSA W/O US: CPT | Performed by: ORTHOPAEDIC SURGERY

## 2021-07-29 RX ORDER — HYALURONATE SODIUM 10 MG/ML
20 SYRINGE (ML) INTRAARTICULAR
Status: COMPLETED | OUTPATIENT
Start: 2021-07-29 | End: 2021-07-29

## 2021-07-29 RX ADMIN — Medication 20 MG: at 14:59

## 2021-07-29 NOTE — PROGRESS NOTES
Assessment:  1  Primary osteoarthritis of left knee  Large joint arthrocentesis: L knee   2  Chronic pain of left knee  Large joint arthrocentesis: L knee       Plan:  The patient was provided with 3rd left knee Euflexxa injection  She tolerated the procedure well  She should follow up in 3 months  To do next visit:  Return in about 3 months (around 10/29/2021)  The above stated was discussed in layman's terms and the patient expressed understanding  All questions were answered to the patient's satisfaction  Scribe Attestation    I,:  Berta Blanco am acting as a scribe while in the presence of the attending physician :       I,:  Cuco Centeno MD personally performed the services described in this documentation    as scribed in my presence :             Subjective:   Meliza South is a 63 y o  female who presents for 3rd Euflexxa injection  Today she complains of generalized left knee pain  Prolonged weight bearing and repetitive bending aggravates while rest alleviates          Review of systems negative unless otherwise specified in HPI    Past Medical History:   Diagnosis Date    Allergic rhinitis     Asthma     Disease of thyroid gland     Hashimoto's    Fibroid     Hypertension     Hypothyroidism     Primary osteoarthritis of knee     Left - last assessed: Aug 23, 2017    Seasonal allergies     Varicella        Past Surgical History:   Procedure Laterality Date    APPENDECTOMY      COLONOSCOPY  06/30/2014    DIAGNOSTIC LAPAROSCOPY      HAND SURGERY Left     MAMMO (HISTORICAL)  11/26/2016    THYROIDECTOMY  1998    partial       Family History   Problem Relation Age of Onset    Hypertension Mother     Hyperlipidemia Mother     Skin cancer Mother    Ciarra Roles Stroke Mother     Diabetes Father     Heart attack Father     Stroke Father     Skin cancer Father     Breast cancer Maternal Aunt         > 48    Colon cancer Maternal Grandmother         >50    No Known Problems Daughter     No Known Problems Maternal Aunt     No Known Problems Maternal Aunt     Hyperlipidemia Family     Hypertension Family     Heart disease Family        Social History     Occupational History    Not on file   Tobacco Use    Smoking status: Never Smoker    Smokeless tobacco: Never Used   Vaping Use    Vaping Use: Never used   Substance and Sexual Activity    Alcohol use: Yes    Drug use: No    Sexual activity: Not Currently     Birth control/protection: Post-menopausal         Current Outpatient Medications:     acetaminophen (TYLENOL) 500 mg tablet, Take 500 mg by mouth every 6 (six) hours as needed, Disp: , Rfl:     ALPRAZolam (XANAX) 0 25 mg tablet, TAKE 2 TABLETS DAILY FOR ANXIETY, Disp: , Rfl: 2    ASMANEX 30 METERED DOSES 110 MCG/INH AEPB inhaler, Inhale 1 puff daily, Disp: , Rfl: 1    Brimonidine Tartrate (MIRVASO) 0 33 % GEL, Mirvaso 0 33 % topical gel, Disp: , Rfl:     Cholecalciferol (VITAMIN D-3) 5000 units TABS, , Disp: , Rfl:     diclofenac sodium (Voltaren) 1 %, Voltaren 1 % topical gel  APPLY 2 GRAM TO THE AFFECTED AREA(S) BY TOPICAL ROUTE 4 TIMES PER DAY, Disp: , Rfl:     EPINEPHrine (EPIPEN 2-PHOENIX) 0 3 mg/0 3 mL SOAJ, EpiPen 2-Phoenix 0 3 mg/0 3 mL injection, auto-injector, Disp: , Rfl:     estradiol (ESTRACE VAGINAL) 0 1 mg/g vaginal cream, INSERT ONE GRAM DAILY VAGINALLY AT NIGHT FOR 14 DAYS, THEN TWICE WEEKLY FOR MAINTENANCE , Disp: , Rfl:     Fexofenadine HCl (ALLEGRA PO), take 2 tablet (60MG)  by oral route 2 times every day, Disp: , Rfl:     levalbuterol (XOPENEX HFA) 45 mcg/act inhaler, Inhale 2 puffs every 4 (four) hours as needed, Disp: , Rfl:     levothyroxine (SYNTHROID) 137 mcg tablet, Synthroid 137 mcg tablet, Disp: , Rfl:     levothyroxine 125 mcg tablet, Take 125 mcg by mouth daily  125 mcg & 137 mcg alternate days  , Disp: , Rfl:     losartan (COZAAR) 50 mg tablet, TAKE 1 TABLET DAILY, Disp: 90 tablet, Rfl: 3    mometasone (NASONEX) 50 mcg/act nasal spray, mometasone 50 mcg/actuation nasal spray, Disp: , Rfl:     olopatadine (PATANOL) 0 1 % ophthalmic solution, olopatadine 0 1 % eye drops, Disp: , Rfl:     ORACEA 40 MG capsule, Take 40 mg by mouth daily, Disp: , Rfl: 3    Restasis 0 05 % ophthalmic emulsion, , Disp: , Rfl:     Salicylic Acid 6 % CREA, salicylic acid 6 % topical cream, Disp: , Rfl:     urea (CARMOL) 40 %, urea 40 % topical cream, Disp: , Rfl:     ascorbic acid (VITAMIN C) 500 mg tablet, Vitamin C 500 mg tablet  TAKE 1 TABLET DAILY FOR 50 DAYS (Patient not taking: Reported on 7/13/2021), Disp: , Rfl:     Azelastine HCl 0 15 % SOLN, INSERT 2 SPRAYS 2 TIMES DAILY  (Patient not taking: Reported on 7/13/2021), Disp: , Rfl: 0    Calcium Ascorbate 500 MG TABS, TAKE 1 TABLET DAILY FOR 50 DAYS (Patient not taking: Reported on 7/13/2021), Disp: , Rfl:     chlorhexidine (PERIDEX) 0 12 % solution, RINSE 1/2 OUNCE TWICE A DAY FOR 30 SECONDS EXPECTORATE   NO NOT RINSE (Patient not taking: Reported on 7/13/2021), Disp: , Rfl: 0    fluticasone (FLONASE) 50 mcg/act nasal spray, fluticasone propionate 50 mcg/actuation nasal spray,suspension (Patient not taking: Reported on 7/13/2021), Disp: , Rfl:     labetalol (NORMODYNE) 100 mg tablet, , Disp: , Rfl:     Lidocaine Viscous HCl (XYLOCAINE) 2 % mucosal solution, USE 5 ML BY MOUTH 4 TIMES DAILY (BEFORE MEALS AND AT BEDTIME) GARGLE FOR THROAT PAIN  (Patient not taking: Reported on 7/13/2021), Disp: , Rfl: 0    loratadine (CLARITIN) 10 mg tablet, TAKE 1T ABLET BY MOUTH DAILY   (Patient not taking: Reported on 7/13/2021), Disp: , Rfl: 0    SUMAtriptan (IMITREX) 50 mg tablet, Take 1 tablet (50 mg total) by mouth once as needed for migraine for up to 1 dose (Patient not taking: Reported on 7/21/2021), Disp: 9 tablet, Rfl: 0    Allergies   Allergen Reactions    Other Allergic Rhinitis    Pollen Extract-Tree Extract [Pollen Extract] Allergic Rhinitis            Vitals:    07/29/21 1442   BP: 150/92   Pulse: 91       Objective:  Physical exam  · General: Awake, Alert, Oriented  · Eyes: Pupils equal, round and reactive to light  · Heart: regular rate and rhythm  · Lungs: No audible wheezing  · Abdomen: soft                    Ortho Exam  Left knee:  Varus alignment  No erythema or ecchymosis  No effusion or swelling  Normal strength  Good ROM with crepitus   Calf compartments soft and supple  Sensation intact  Toes are warm sensate and mobile        Diagnostics, reviewed and taken today if performed as documented:    None performed     Procedures, if performed today:    Large joint arthrocentesis: L knee  Universal Protocol:  Consent: Verbal consent obtained  Risks and benefits: risks, benefits and alternatives were discussed  Consent given by: patient  Time out: Immediately prior to procedure a "time out" was called to verify the correct patient, procedure, equipment, support staff and site/side marked as required  Timeout called at: 7/29/2021 2:58 PM   Patient understanding: patient states understanding of the procedure being performed  Patient identity confirmed: verbally with patient    Supporting Documentation  Indications: pain   Procedure Details  Location: knee - L knee  Preparation: Patient was prepped and draped in the usual sterile fashion  Needle size: 22 G  Ultrasound guidance: no  Approach: anterolateral  Medications administered: 20 mg Sodium Hyaluronate 20 MG/2ML  Specialty Pharmacy Supplied: received medications from pharmacy  Patient tolerance: patient tolerated the procedure well with no immediate complications  Dressing:  Sterile dressing applied              Portions of the record may have been created with voice recognition software  Occasional wrong word or "sound a like" substitutions may have occurred due to the inherent limitations of voice recognition software  Read the chart carefully and recognize, using context, where substitutions have occurred

## 2021-08-16 ENCOUNTER — HOSPITAL ENCOUNTER (OUTPATIENT)
Dept: MRI IMAGING | Facility: HOSPITAL | Age: 61
Discharge: HOME | End: 2021-08-16
Attending: PSYCHIATRY & NEUROLOGY
Payer: COMMERCIAL

## 2021-08-16 DIAGNOSIS — R47.89 WORD FINDING DIFFICULTY: ICD-10-CM

## 2021-08-16 DIAGNOSIS — G43.109 MIGRAINE WITH AURA AND WITHOUT STATUS MIGRAINOSUS, NOT INTRACTABLE: ICD-10-CM

## 2021-08-16 PROCEDURE — 70553 MRI BRAIN STEM W/O & W/DYE: CPT

## 2021-08-16 PROCEDURE — A9585 GADOBUTROL INJECTION: HCPCS | Performed by: PSYCHIATRY & NEUROLOGY

## 2021-08-16 PROCEDURE — G1004 CDSM NDSC: HCPCS

## 2021-08-16 RX ADMIN — GADOBUTROL 7.5 ML: 604.72 INJECTION INTRAVENOUS at 07:18

## 2021-08-20 ENCOUNTER — TELEPHONE (OUTPATIENT)
Dept: NEUROLOGY | Facility: CLINIC | Age: 61
End: 2021-08-20

## 2021-08-20 NOTE — TELEPHONE ENCOUNTER
Called and spoke to patient - confirmed upcoming appointment with Dustin Haider 09/03/21 2:00 pm at the [address] [place] [address]  Provided patient with apt date, time and location  Informed patient that check in is at least 15 minutes prior to apt time  The patient is not  having any issues or concerns at this time

## 2021-09-02 NOTE — PROGRESS NOTES
[facility] Consult - Follow up   PATIENT:  Taylor Rivas  MRN:  [id number]  :  1960  DATE OF SERVICE:  9/3/2021  REFERRED BY: No ref  provider found  PMD: Ashley Alba MD    Assessment/Plan:     Taylor Rivas is a delightful 63 y o  female with a past medical history that includes Migraine with aura, Right carpal tunnel syndrome, Asthma, leiomyoma of uterus, chronic left knee pain, stress incontinence, neutropenia, peroneal tendinitis,  Hypothyroidism following hasiomotos, vitamin-D deficiency, anxiety, depression,  status post hysterectomy 2021, seasonal allergies  referred here for evaluation of headache  My initial evaluation 2021  Follow up 9/3/2021     Migraine with aura and without status migrainosus, not intractable  She reports a history of headaches and migraines dating back to her 35s that have been infrequent through the years and often hormonal related  She reports typical associated migrainous features without unilateral autonomic features  Often would have a visual aura with zigzag or blocking out of vision lasting up to an hour before headache in the past   Only once in the remote past about 18 years ago had trouble forming sentences and finding words,  Up until this happened again twice in the past month  She had significant improvement in migraines following menopause 5-6 years ago when she had her last migraine that she recalls  She had a hysterectomy and removal of ovaries 2021  -   As of 2021:  On 2021 had visual aura and speech symptoms with migraine  And 2021 migraine with visual aura and word-finding difficulty lasting approximately 30 minutes and resolving spontaneously with continued migraine  For this, she was evaluated in the emergency department where CTA head and neck were unremarkable for vascular abnormality or aneurysm or other pathology    She was referred to Neurology due to the recent increase with return of the language symptoms  We discussed this does sound likely migraine with aura, possibly related to stress or hormonal changes from recent surgery  - as of 9/3/2021:   She reports 1 migraine with visual aura with no language aura since last visit that resolved quickly with sumatriptan 50 mg  She is not interested in prescription preventative at this time  MRI Brain reviewed  Workup:  - MRI Brain with and without contrast 8/16/21: No acute intracranial abnormality  A few small white matter hyperintensities are seen on T2 and FLAIR imaging within the cerebral hemispheres suggestive of mild chronic microangiopathic change  -  CTA head and neck with without contrast 07/12/2021: CT brain: No acute intracranial abnormality  CT angiography: No stenosis, occlusion or thrombosis of the cervical or intracranial vasculature  Preventative:  - we discussed headache hygiene and lifestyle factors that may improve headaches  -  Prescription preventative not indicated at this time, discussed with patient if she would like to try something she could always try headache preventive supplements first for three-month trial  - Past/ failed/contraindicated:  Losartan, amitriptyline, fluoxetine, Wellbutrin side effects, propranolol would be contraindicated due to history of asthma  - future options:  Topiramate, CGRP med     Abortive:  - discussed not taking over-the-counter or prescription pain medications more than 3 days per week to prevent medication overuse/rebound headache  -  Currently on through other providers: Sumatriptan 50 mg  Discussed proper use, possible side effects and risks  - Past/ failed/contraindicated:  none  - future options:   Alternative Triptan, prochlorperazine, Toradol IM or p o , could consider trial for 5 days of Depakote or dexamethasone 4 prolonged migraine, ubrelvy, reyvow, nurtec    Patient instructions          Headache/migraine treatment:   Abortive medications (for immediate treatment of a headache): It is ok to take ibuprofen, acetaminophen or naproxen (Advil, Tylenol,  Aleve, Excedrin) if they help your headaches you should limit these to No more than 3 times a week to avoid medication overuse/rebound headaches  For your more moderate to severe migraines take this medication early   Sumatriptan/imitrex 50mg tabs - take one at the onset of headache  May repeat one time after 1-2 hours if pain has not resolved  (Max 200 mg a day, 3 days a week and 9 days a month)       Over the counter preventive supplements for headaches/migraines (if you try, try for 3 months straight)  (to take every day to help prevent headaches - not to take at the time of headache):  (there are combo pills online of these) - preventa migraine, migra leaf, mind ease   [] Magnesium 400mg daily (If any diarrhea or upset stomach, decrease dose  as tolerated)  [] Riboflavin (Vitamin B2) 400mg daily- try online   (FYI B2 may make your urine bright/neon yellow)  AND/OR  [] Herbal medication: Petasites/Butterbur 150 mg daily - try online  (When choosing your Butterbur online or in the store, beware that there are some poor preps containing pyrrolizidine alkaloids (PAs) that can be harmful to the liver  Therefore, do not use butterbur products that are not labeled as PA-free )    Prescription preventive medications for headaches/migraines   (to take every day to help prevent headaches - not to take at the time of headache):  [x] we have options if needed       Lifestyle Recommendations:  [x] SLEEP - Maintain a regular sleep schedule: Adults need at least 7-8 hours of uninterrupted a night  Maintain good sleep hygiene:  Going to bed and waking up at consistent times, avoiding excessive daytime naps, avoiding caffeinated beverages in the evening, avoid excessive stimulation in the evening and generally using bed primarily for sleeping    One hour before bedtime would recommend turning lights down lower, decreasing your activity (may read quietly, listen to music at a low volume)  When you get into bed, should eliminate all technology (no texting, emailing, playing with your phone, iPad or tablet in bed)  [x] HYDRATION - Maintain good hydration  Drink  2L of fluid a day (4 typical small water bottles)  [x] DIET - Maintain good nutrition  In particular don't skip meals and try and eat healthy balanced meals regularly  [x] TRIGGERS - Look for other triggers and avoid them: Limit caffeine to 1-2 cups a day or less  Avoid dietary triggers that you have noticed bring on your headaches (this could include aged cheese, peanuts, MSG, aspartame and nitrates)  [x] EXERCISE - physical exercise as we all know is good for you in many ways, and not only is good for your heart, but also is beneficial for your mental health, cognitive health and  chronic pain/headaches  I would encourage at the least 5 days of physical exercise weekly for at least 30 minutes  Education and Follow-up  [x] Please call with any questions or concerns  Of course if any new concerning symptoms go to the emergency department  [x] Follow up with me if needed in a year if your primary doctor is not comfortable refilling the sumatriptan then I will as there are no contraindications        CC: We had the pleasure of evaluating Elena Nayak in neurological consultation today  Elena Nayak is a   right handed female who presents today for evaluation of headaches  History obtained from patient as well as available medical record review    History of Present Illness:   Interval history as of 9/3/2021  - denies any new or concerning neurologic symptoms since last visit     Headaches and migraines   - Migraines well controlled - just had one migraine in past 6 weeks  -  just aura with visual disturbance of jagged vision of both eyes on last Friday last migriane that dissipated, no speech aura this time  Preventative: None   Abortive: sumatriptan 50mg - worked in 10 mins, no SE   Denies bothersome side effects      history as of initial visit 07/21/2021  Had one 7/8/21 with vision and speech symptoms     On 07/12/2021, seen in ED for  Migraine with visual aura and word-finding difficulty  -  Reporting a couple of episodes in the week prior of visual aura  Of sparkling lights followed by onset of headache and word-finding difficulty  Most recently happened after allergy shots 07/12/2021 were visual aura and word-finding difficulty last about 30 minutes before resolving spontaneously  Headache continued into ED visit  Reported hysterectomy 1 week prior   -   CTA head and neck with without contrast was unremarkable for acute pathology  -  Visual acuity  20-40 bilaterally neuro exam unremarkable  Migraine cocktail declined by patient has headache had resolved      Headaches started at what age? 38years old - first when pregnant with first child   How often do the headaches occur?   -  18 years ago - had word finding difficulty   - periodically mild headache and visual aura, 1-2 times a year   - 6 years ago had last migraine (menopause 5 years ago)  - as of 7/21/2021: 2 this month within a week of each other   What time of the day do the headaches start? No particular time of day   How long do the headaches last? Maybe an hour or more  Are you ever headache free? Yes    Aura? with aura   - more often visual aura - up to an hour, zig zag or blocking of vision, either side/eye  - speech 18 years ago and twice this time - trouble forming sentences and findings words      Last eye exam: last week 7/2021, everything normal except for rosacea and mole by ophthalmology    Where is your headache located and pain quality?   - apex or midfrontal sinus  - dull achy  What is the intensity of pain?  Average: 5/10, worst 8-9/10  Associated symptoms:   [x] Nausea       [] Vomiting        [x] Decreased appetite  [x] Problems with concentration   [x] Photophobia > [x]Phonophobia      [x] Osmophobia  [] Blurred vision   [x] Prefer quiet, dark room  [] Light-headed or dizzy     [] Tinnitus   [x] Hands or feet tingle or feel numb/paresthesias       [] Ptosis      [] Facial droop  [] Lacrimation  [x] Nasal congestion    Things that make the headache worse? No specific movements    Headache triggers:  Bright lights, barometric, stress, hormones     Have you seen someone else for headaches or pain? Yes, PCP, ophthalmology  Have you had trigger point injection performed and how often? No  Have you had Botox injection performed and how often? No   Have you had epidural injections or transforaminal injections performed? No  Are you current pregnant or planning on getting pregnant? Hysterectomy and oopherectomy   Have you ever had any Brain imaging? no    What medications do you take or have you taken for your headaches?    ABORTIVE:    OTC medications have been ineffective      sumatriptan 50 mg - would help in the past     Past  None     PREVENTIVE:        for BP: Losartan   for mood: Xanax    Past/ failed/contraindicated:  Propranolol would be contraindicated due to history of asthma  Amitriptyline  Fluoxetine for a long time  wellbutrin       LIFESTYLE  Sleep   - averages: 8-9 hours   Problems falling asleep?:   No  Problems staying asleep?:  No      Water: 6-8 glasses per day  Caffeine: 1 cup coffee per day    Mood:  anxiety and depression - much better     The following portions of the patient's history were reviewed and updated as appropriate: allergies, current medications, past family history, past medical history, past social history, past surgical history and problem list     Pertinent family history:  Family history of headaches:  no known family members with significant headaches  Any family history of aneurysms - No  Both parents strokes dad in [de-identified], mom in 80s (vascular dementia), lived to 80s   Dad had black mass on brain     Pertinent social history:  Work: teach elementary art - [employer]   Lives alone     Illicit Drugs: denies  Alcohol/tobacco: Denies alcohol use, Denies tobacco use    Past Medical History:     Past Medical History:   Diagnosis Date    Allergic rhinitis     Asthma     Disease of thyroid gland     Hashimoto's    Fibroid     Hypertension     Hypothyroidism     Primary osteoarthritis of knee     Left - last assessed: Aug 23, 2017    Seasonal allergies     Varicella        Patient Active Problem List   Diagnosis    Asthma    Leiomyoma of uterus    Chronic pain of left knee    Prolapse of anterior vaginal wall    Stress incontinence in female    Neutropenia, unspecified (HCC)       Medications:      Current Outpatient Medications   Medication Sig Dispense Refill    ALPRAZolam (XANAX) 0 25 mg tablet TAKE 2 TABLETS DAILY FOR ANXIETY  2    ASMANEX 30 METERED DOSES 110 MCG/INH AEPB inhaler Inhale 1 puff daily  1    Brimonidine Tartrate (MIRVASO) 0 33 % GEL Mirvaso 0 33 % topical gel      Cholecalciferol (VITAMIN D-3) 5000 units TABS       diclofenac sodium (Voltaren) 1 % Voltaren 1 % topical gel   APPLY 2 GRAM TO THE AFFECTED AREA(S) BY TOPICAL ROUTE 4 TIMES PER DAY      EPINEPHrine (EPIPEN 2-PHOENIX) 0 3 mg/0 3 mL SOAJ EpiPen 2-[place] 0 3 mg/0 3 mL injection, auto-injector      estradiol (ESTRACE VAGINAL) 0 1 mg/g vaginal cream INSERT ONE GRAM DAILY VAGINALLY AT NIGHT FOR 14 DAYS, THEN TWICE WEEKLY FOR MAINTENANCE   Fexofenadine HCl (ALLEGRA PO) take 2 tablet (60MG)  by oral route 2 times every day      fluticasone (FLONASE) 50 mcg/act nasal spray fluticasone propionate 50 mcg/actuation nasal spray,suspension      levalbuterol (XOPENEX HFA) 45 mcg/act inhaler Inhale 2 puffs every 4 (four) hours as needed      levothyroxine (SYNTHROID) 137 mcg tablet Synthroid 137 mcg tablet      levothyroxine 125 mcg tablet Take 125 mcg by mouth daily  125 mcg & 137 mcg alternate days        loratadine (CLARITIN) 10 mg tablet TAKE 1T ABLET BY MOUTH DAILY  0    losartan (COZAAR) 50 mg tablet TAKE 1 TABLET DAILY 90 tablet 3    mometasone (NASONEX) 50 mcg/act nasal spray mometasone 50 mcg/actuation nasal spray      olopatadine (PATANOL) 0 1 % ophthalmic solution olopatadine 0 1 % eye drops      Restasis 0 05 % ophthalmic emulsion       Salicylic Acid 6 % CREA salicylic acid 6 % topical cream      SUMAtriptan (IMITREX) 50 mg tablet Take 1 tablet (50 mg total) by mouth once as needed for migraine for up to 1 dose 9 tablet 0    urea (CARMOL) 40 % urea 40 % topical cream      acetaminophen (TYLENOL) 500 mg tablet Take 500 mg by mouth every 6 (six) hours as needed (Patient not taking: Reported on 9/3/2021)      ascorbic acid (VITAMIN C) 500 mg tablet Vitamin C 500 mg tablet   TAKE 1 TABLET DAILY FOR 50 DAYS (Patient not taking: Reported on 7/13/2021)      Azelastine HCl 0 15 % SOLN INSERT 2 SPRAYS 2 TIMES DAILY  (Patient not taking: Reported on 7/13/2021)  0    Calcium Ascorbate 500 MG TABS TAKE 1 TABLET DAILY FOR 50 DAYS (Patient not taking: Reported on 7/13/2021)      chlorhexidine (PERIDEX) 0 12 % solution RINSE 1/2 OUNCE TWICE A DAY FOR 30 SECONDS EXPECTORATE   NO NOT RINSE (Patient not taking: Reported on 7/13/2021)  0    labetalol (NORMODYNE) 100 mg tablet  (Patient not taking: Reported on 7/13/2021)      Lidocaine Viscous HCl (XYLOCAINE) 2 % mucosal solution USE 5 ML BY MOUTH 4 TIMES DAILY (BEFORE MEALS AND AT BEDTIME) GARGLE FOR THROAT PAIN  (Patient not taking: Reported on 7/13/2021)  0    ORACEA 40 MG capsule Take 40 mg by mouth daily (Patient not taking: Reported on 9/3/2021)  3     No current facility-administered medications for this visit  Allergies:       Allergies   Allergen Reactions    Other Allergic Rhinitis    Pollen Extract-Tree Extract [Pollen Extract] Allergic Rhinitis       Family History:     Family History   Problem Relation Age of Onset    Hypertension Mother     Hyperlipidemia Mother     Skin cancer Mother     Stroke Mother     Diabetes Father     Heart attack Father     Stroke Father     Skin cancer Father     Breast cancer Maternal Aunt         > 48    Colon cancer Maternal Grandmother         >50    No Known Problems Daughter     No Known Problems Maternal Aunt     No Known Problems Maternal Aunt     Hyperlipidemia Family     Hypertension Family     Heart disease Family        Social History:     Social History     Socioeconomic History    Marital status:      Spouse name: Not on file    Number of children: Not on file    Years of education: Not on file    Highest education level: Not on file   Occupational History    Not on file   Tobacco Use    Smoking status: Never Smoker    Smokeless tobacco: Never Used   Vaping Use    Vaping Use: Never used   Substance and Sexual Activity    Alcohol use: Yes    Drug use: No    Sexual activity: Not Currently     Birth control/protection: Post-menopausal   Other Topics Concern    Not on file   Social History Narrative    Marital status:  - As per [facility]     Social Determinants of Health     Financial Resource Strain:     Difficulty of Paying Living Expenses:    Food Insecurity:     Worried About Running Out of Food in the Last Year:     [address] in the Last Year:    Transportation Needs:     Lack of Transportation (Medical):      Lack of Transportation (Non-Medical):    Physical Activity:     Days of Exercise per Week:     Minutes of Exercise per Session:    Stress:     Feeling of Stress :    Social Connections:     Frequency of Communication with Friends and Family:     Frequency of Social Gatherings with Friends and Family:     Attends Church Services:     Active Member of Clubs or Organizations:     Attends Club or Organization Meetings:     Marital Status:    Intimate Partner Violence:     Fear of Current or Ex-Partner:     Emotionally Abused:     Physically Abused:     Sexually Abused: Objective:       Physical Exam:                                                                 Vitals:            Constitutional:    /82 (BP Location: Left arm, Patient Position: Sitting, Cuff Size: Large)   Pulse 77   Ht 5' 4" (1 626 m)   Wt 77 6 kg (171 lb 1 6 oz)   LMP  (LMP Unknown)   BMI 29 37 kg/m²   BP Readings from Last 3 Encounters:   09/03/21 133/82   07/29/21 150/92   07/22/21 126/87     Pulse Readings from Last 3 Encounters:   09/03/21 77   07/29/21 91   07/22/21 90         Well developed, well nourished, well groomed  No dysmorphic features  HEENT:  Normocephalic atraumatic  See neuro exam   Chest:  Respirations appear regular and unlabored  Cardiovascular:  no observed significant swelling  Musculoskeletal:  (see below under neurologic exam for evaluation of motor function and gait)   Skin:  warm and dry, not diaphoretic  Psychiatric:  Normal behavior and appropriate affect        Neurological Examination:     Mental status/cognitive function:   Recent and remote memory intact  Attention span and concentration as well as fund of knowledge are appropriate for age  Normal language and spontaneous speech  Cranial Nerves:  III, IV, VI-Pupils were equal, round  Extraocular movements were full and conjugate   VII-facial expression symmetric  VIII-hearing grossly intact bilaterally   Motor Exam: symmetric bulk throughout  no atrophy, fasciculations or abnormal movements noted  Coordination:  no apparent dysmetria, ataxia or tremor noted  Gait: steady casual gait      Pertinent lab results:   See EMR for recent labs  TSH checked outside of [facility] every 6 months   07/12/2021 BMP and CBC unremarkable  07/24/2020 LFTs normal     Copper 91  06/18/2015 vitamin-D 33 - takes 2000 units daily   EKG 07/12/2021 normal sinus rhythm, normal EKG, rate 72,      Imaging:    - MRI Brain with and without contrast 8/16/21: No acute intracranial abnormality    A few small white matter hyperintensities are seen on T2 and FLAIR imaging within the cerebral hemispheres suggestive of mild chronic microangiopathic change  -  CTA head and neck with without contrast 07/12/2021:   CT brain: No acute intracranial abnormality  CT angiography: No stenosis, occlusion or thrombosis of the cervical or intracranial vasculature  Review of Systems:   ROS obtained by medical assistant Personally reviewed and updated if indicated  I recommended PCP follow up for non neurologic problems  Review of Systems   Constitutional: Negative  Negative for appetite change and fever  HENT: Negative  Negative for hearing loss, tinnitus, trouble swallowing and voice change  Eyes: Negative  Negative for photophobia and pain  Respiratory: Negative  Negative for shortness of breath  Cardiovascular: Negative  Negative for palpitations  Gastrointestinal: Negative  Negative for nausea and vomiting  Endocrine: Negative  Negative for cold intolerance  Genitourinary: Negative  Negative for dysuria, frequency and urgency  Musculoskeletal: Negative  Negative for myalgias and neck pain  Skin: Negative  Negative for rash  Neurological: Negative  Negative for dizziness, tremors, seizures, syncope, facial asymmetry, speech difficulty, weakness, light-headedness, numbness and headaches  Hematological: Negative  Does not bruise/bleed easily  Psychiatric/Behavioral: Negative  Negative for confusion, hallucinations and sleep disturbance  I have spent 20 minutes with Patient  today in which greater than 50% of this time was spent in counseling/coordination of care regarding Diagnostic results, Prognosis, Risks and benefits of tx options, Intructions for management, Patient education, Risk factor reductions and Impressions  I also spent 25 minutes non face to face for this patient the same day         Author:  Colton Garcia MD 9/3/2021 5:27 PM

## 2021-09-03 ENCOUNTER — OFFICE VISIT (OUTPATIENT)
Dept: NEUROLOGY | Facility: CLINIC | Age: 61
End: 2021-09-03
Payer: COMMERCIAL

## 2021-09-03 VITALS
DIASTOLIC BLOOD PRESSURE: 82 MMHG | SYSTOLIC BLOOD PRESSURE: 133 MMHG | WEIGHT: 171.1 LBS | HEART RATE: 77 BPM | BODY MASS INDEX: 29.21 KG/M2 | HEIGHT: 64 IN

## 2021-09-03 DIAGNOSIS — G43.109 MIGRAINE WITH AURA AND WITHOUT STATUS MIGRAINOSUS, NOT INTRACTABLE: Primary | ICD-10-CM

## 2021-09-03 PROCEDURE — 3008F BODY MASS INDEX DOCD: CPT | Performed by: PSYCHIATRY & NEUROLOGY

## 2021-09-03 PROCEDURE — 99215 OFFICE O/P EST HI 40 MIN: CPT | Performed by: PSYCHIATRY & NEUROLOGY

## 2021-09-03 NOTE — PATIENT INSTRUCTIONS
before bedtime would recommend turning lights down lower, decreasing your activity (may read quietly, listen to music at a low volume)  When you get into bed, should eliminate all technology (no texting, emailing, playing with your phone, iPad or tablet in bed)  [x] HYDRATION - Maintain good hydration  Drink  2L of fluid a day (4 typical small water bottles)  [x] DIET - Maintain good nutrition  In particular don't skip meals and try and eat healthy balanced meals regularly  [x] TRIGGERS - Look for other triggers and avoid them: Limit caffeine to 1-2 cups a day or less  Avoid dietary triggers that you have noticed bring on your headaches (this could include aged cheese, peanuts, MSG, aspartame and nitrates)  [x] EXERCISE - physical exercise as we all know is good for you in many ways, and not only is good for your heart, but also is beneficial for your mental health, cognitive health and  chronic pain/headaches  I would encourage at the least 5 days of physical exercise weekly for at least 30 minutes  Education and Follow-up  [x] Please call with any questions or concerns  Of course if any new concerning symptoms go to the emergency department    [x] Follow up with me if needed in a year if your primary doctor is not comfortable refilling the sumatriptan then I will as there are no contraindications

## 2021-11-12 ENCOUNTER — IMMUNIZATIONS (OUTPATIENT)
Dept: FAMILY MEDICINE CLINIC | Facility: HOSPITAL | Age: 61
End: 2021-11-12

## 2021-11-12 DIAGNOSIS — Z23 ENCOUNTER FOR IMMUNIZATION: Primary | ICD-10-CM

## 2021-11-12 PROCEDURE — 91306 COVID-19 MODERNA VACC 0.25 ML BOOSTER: CPT

## 2021-11-12 PROCEDURE — 0013A COVID-19 MODERNA VACC 0.25 ML BOOSTER: CPT

## 2021-11-26 ENCOUNTER — HOSPITAL ENCOUNTER (OUTPATIENT)
Dept: RADIOLOGY | Age: 61
Discharge: HOME | End: 2021-11-26
Payer: COMMERCIAL

## 2021-11-26 VITALS — BODY MASS INDEX: 29.02 KG/M2 | WEIGHT: 170 LBS | HEIGHT: 64 IN

## 2021-11-26 DIAGNOSIS — Z12.31 ENCOUNTER FOR SCREENING MAMMOGRAM FOR MALIGNANT NEOPLASM OF BREAST: ICD-10-CM

## 2021-11-26 PROCEDURE — 77063 BREAST TOMOSYNTHESIS BI: CPT

## 2021-11-26 PROCEDURE — 77067 SCR MAMMO BI INCL CAD: CPT

## 2021-12-02 ENCOUNTER — VBI (OUTPATIENT)
Dept: ADMINISTRATIVE | Facility: OTHER | Age: 61
End: 2021-12-02

## 2021-12-07 ENCOUNTER — TELEPHONE (OUTPATIENT)
Dept: INTERNAL MEDICINE CLINIC | Facility: CLINIC | Age: 61
End: 2021-12-07

## 2022-02-15 ENCOUNTER — LAB (OUTPATIENT)
Dept: LAB | Age: 62
End: 2022-02-15
Payer: COMMERCIAL

## 2022-02-15 ENCOUNTER — OFFICE VISIT (OUTPATIENT)
Dept: INTERNAL MEDICINE CLINIC | Facility: CLINIC | Age: 62
End: 2022-02-15
Payer: COMMERCIAL

## 2022-02-15 VITALS
TEMPERATURE: 98.1 F | WEIGHT: 177 LBS | OXYGEN SATURATION: 97 % | SYSTOLIC BLOOD PRESSURE: 136 MMHG | BODY MASS INDEX: 30.22 KG/M2 | HEART RATE: 86 BPM | DIASTOLIC BLOOD PRESSURE: 90 MMHG | HEIGHT: 64 IN

## 2022-02-15 DIAGNOSIS — E03.8 HYPOTHYROIDISM DUE TO HASHIMOTO'S THYROIDITIS: ICD-10-CM

## 2022-02-15 DIAGNOSIS — E06.5 HYPOTHYROIDISM DUE TO FIBROUS INVASIVE THYROIDITIS: ICD-10-CM

## 2022-02-15 DIAGNOSIS — E06.3 HYPOTHYROIDISM DUE TO HASHIMOTO'S THYROIDITIS: ICD-10-CM

## 2022-02-15 DIAGNOSIS — I10 ESSENTIAL HYPERTENSION, MALIGNANT: ICD-10-CM

## 2022-02-15 DIAGNOSIS — G43.109 MIGRAINE WITH AURA AND WITHOUT STATUS MIGRAINOSUS, NOT INTRACTABLE: ICD-10-CM

## 2022-02-15 DIAGNOSIS — J45.909 INTRINSIC ASTHMA WITHOUT STATUS ASTHMATICUS WITHOUT COMPLICATION, UNSPECIFIED ASTHMA SEVERITY: ICD-10-CM

## 2022-02-15 DIAGNOSIS — E03.8 HYPOTHYROIDISM DUE TO FIBROUS INVASIVE THYROIDITIS: ICD-10-CM

## 2022-02-15 DIAGNOSIS — E66.9 OBESITY, UNSPECIFIED CLASSIFICATION, UNSPECIFIED OBESITY TYPE, UNSPECIFIED WHETHER SERIOUS COMORBIDITY PRESENT: ICD-10-CM

## 2022-02-15 DIAGNOSIS — K21.9 GASTROESOPHAGEAL REFLUX DISEASE, UNSPECIFIED WHETHER ESOPHAGITIS PRESENT: ICD-10-CM

## 2022-02-15 DIAGNOSIS — E66.9 OBESITY (BMI 30-39.9): ICD-10-CM

## 2022-02-15 DIAGNOSIS — E06.3 CHRONIC LYMPHOCYTIC THYROIDITIS: ICD-10-CM

## 2022-02-15 DIAGNOSIS — R10.13 EPIGASTRIC PAIN: Primary | ICD-10-CM

## 2022-02-15 DIAGNOSIS — J45.20 MILD INTERMITTENT ASTHMA, UNSPECIFIED WHETHER COMPLICATED: ICD-10-CM

## 2022-02-15 DIAGNOSIS — R10.13 ABDOMINAL PAIN, EPIGASTRIC: ICD-10-CM

## 2022-02-15 DIAGNOSIS — I10 PRIMARY HYPERTENSION: ICD-10-CM

## 2022-02-15 LAB
ALBUMIN SERPL BCP-MCNC: 4 G/DL (ref 3.5–5)
ALP SERPL-CCNC: 128 U/L (ref 46–116)
ALT SERPL W P-5'-P-CCNC: 26 U/L (ref 12–78)
ANION GAP SERPL CALCULATED.3IONS-SCNC: 6 MMOL/L (ref 4–13)
AST SERPL W P-5'-P-CCNC: 22 U/L (ref 5–45)
BASOPHILS # BLD AUTO: 0.04 THOUSANDS/ΜL (ref 0–0.1)
BASOPHILS NFR BLD AUTO: 1 % (ref 0–1)
BILIRUB SERPL-MCNC: 0.57 MG/DL (ref 0.2–1)
BUN SERPL-MCNC: 14 MG/DL (ref 5–25)
CALCIUM SERPL-MCNC: 9.8 MG/DL (ref 8.3–10.1)
CHLORIDE SERPL-SCNC: 107 MMOL/L (ref 100–108)
CHOLEST SERPL-MCNC: 184 MG/DL
CO2 SERPL-SCNC: 27 MMOL/L (ref 21–32)
CREAT SERPL-MCNC: 0.69 MG/DL (ref 0.6–1.3)
EOSINOPHIL # BLD AUTO: 0.06 THOUSAND/ΜL (ref 0–0.61)
EOSINOPHIL NFR BLD AUTO: 1 % (ref 0–6)
ERYTHROCYTE [DISTWIDTH] IN BLOOD BY AUTOMATED COUNT: 13.1 % (ref 11.6–15.1)
GFR SERPL CREATININE-BSD FRML MDRD: 93 ML/MIN/1.73SQ M
GLUCOSE P FAST SERPL-MCNC: 100 MG/DL (ref 65–99)
HCT VFR BLD AUTO: 42.4 % (ref 34.8–46.1)
HDLC SERPL-MCNC: 77 MG/DL
HGB BLD-MCNC: 13.7 G/DL (ref 11.5–15.4)
IMM GRANULOCYTES # BLD AUTO: 0.01 THOUSAND/UL (ref 0–0.2)
IMM GRANULOCYTES NFR BLD AUTO: 0 % (ref 0–2)
LDLC SERPL CALC-MCNC: 98 MG/DL (ref 0–100)
LIPASE SERPL-CCNC: 46 U/L (ref 73–393)
LYMPHOCYTES # BLD AUTO: 1.19 THOUSANDS/ΜL (ref 0.6–4.47)
LYMPHOCYTES NFR BLD AUTO: 19 % (ref 14–44)
MCH RBC QN AUTO: 29.3 PG (ref 26.8–34.3)
MCHC RBC AUTO-ENTMCNC: 32.3 G/DL (ref 31.4–37.4)
MCV RBC AUTO: 91 FL (ref 82–98)
MONOCYTES # BLD AUTO: 0.53 THOUSAND/ΜL (ref 0.17–1.22)
MONOCYTES NFR BLD AUTO: 9 % (ref 4–12)
NEUTROPHILS # BLD AUTO: 4.31 THOUSANDS/ΜL (ref 1.85–7.62)
NEUTS SEG NFR BLD AUTO: 70 % (ref 43–75)
NONHDLC SERPL-MCNC: 107 MG/DL
NRBC BLD AUTO-RTO: 0 /100 WBCS
PLATELET # BLD AUTO: 249 THOUSANDS/UL (ref 149–390)
PMV BLD AUTO: 11.1 FL (ref 8.9–12.7)
POTASSIUM SERPL-SCNC: 4 MMOL/L (ref 3.5–5.3)
PROT SERPL-MCNC: 7.3 G/DL (ref 6.4–8.2)
RBC # BLD AUTO: 4.67 MILLION/UL (ref 3.81–5.12)
SODIUM SERPL-SCNC: 140 MMOL/L (ref 136–145)
TRIGL SERPL-MCNC: 45 MG/DL
WBC # BLD AUTO: 6.14 THOUSAND/UL (ref 4.31–10.16)

## 2022-02-15 PROCEDURE — 1036F TOBACCO NON-USER: CPT | Performed by: INTERNAL MEDICINE

## 2022-02-15 PROCEDURE — 80061 LIPID PANEL: CPT

## 2022-02-15 PROCEDURE — 3008F BODY MASS INDEX DOCD: CPT | Performed by: INTERNAL MEDICINE

## 2022-02-15 PROCEDURE — 83690 ASSAY OF LIPASE: CPT

## 2022-02-15 PROCEDURE — 99214 OFFICE O/P EST MOD 30 MIN: CPT | Performed by: INTERNAL MEDICINE

## 2022-02-15 PROCEDURE — 85025 COMPLETE CBC W/AUTO DIFF WBC: CPT

## 2022-02-15 PROCEDURE — 36415 COLL VENOUS BLD VENIPUNCTURE: CPT

## 2022-02-15 PROCEDURE — 80053 COMPREHEN METABOLIC PANEL: CPT

## 2022-02-15 RX ORDER — PANTOPRAZOLE SODIUM 40 MG/1
TABLET, DELAYED RELEASE ORAL
COMMUNITY
Start: 2022-01-04

## 2022-02-15 RX ORDER — SIMETHICONE 80 MG
TABLET,CHEWABLE ORAL EVERY 8 HOURS
COMMUNITY
Start: 2021-12-06

## 2022-02-15 NOTE — PROGRESS NOTES
Assessment/Plan:    Diagnoses and all orders for this visit:    Epigastric pain  Comments:  Pain associated with flatulence, continue pantoprazole, follow-up with Dr Mary Duran as needed, advised to complete CT abdomen  Orders:  -     Cancel: CBC and differential; Future  -     Cancel: Comprehensive metabolic panel; Future  -     Cancel: Lipid panel; Future  -     Cancel: Lipase; Future  -     Cancel: CBC and differential  -     Cancel: Comprehensive metabolic panel  -     Cancel: Lipid panel  -     Cancel: Lipase  -     CBC and differential; Future  -     Comprehensive metabolic panel; Future  -     Lipase; Future  -     Lipid panel; Future  -     CBC and differential  -     Comprehensive metabolic panel  -     Lipase  -     Lipid panel    Mild intermittent asthma, unspecified whether complicated  Comments:  Stable, continue current medication  Orders:  -     CBC and differential; Future  -     Comprehensive metabolic panel; Future  -     Lipase; Future  -     Lipid panel; Future  -     CBC and differential  -     Comprehensive metabolic panel  -     Lipase  -     Lipid panel    Primary hypertension  Comments:  BP mildly elevated, likely secondary to epigastric pain and flatulence  Will continue to monitor continue current medication  Orders:  -     Cancel: CBC and differential; Future  -     Cancel: Comprehensive metabolic panel; Future  -     Cancel: Lipid panel; Future  -     Cancel: Lipase; Future  -     Cancel: CBC and differential  -     Cancel: Comprehensive metabolic panel  -     Cancel: Lipid panel  -     Cancel: Lipase  -     CBC and differential; Future  -     Comprehensive metabolic panel; Future  -     Lipase; Future  -     Lipid panel; Future  -     CBC and differential  -     Comprehensive metabolic panel  -     Lipase  -     Lipid panel    Obesity (BMI 30-39  9)  Comments:  Encourage lifestyle modification  Orders:  -     Cancel: CBC and differential; Future  -     Cancel: Comprehensive metabolic panel; Future  -     Cancel: Lipid panel; Future  -     Cancel: Lipase; Future  -     Cancel: CBC and differential  -     Cancel: Comprehensive metabolic panel  -     Cancel: Lipid panel  -     Cancel: Lipase  -     CBC and differential; Future  -     Comprehensive metabolic panel; Future  -     Lipase; Future  -     Lipid panel; Future  -     CBC and differential  -     Comprehensive metabolic panel  -     Lipase  -     Lipid panel    Migraine with aura and without status migrainosus, not intractable  Comments:  Stable, takes sumatriptan as needed, follows up with neurologist , last migraine headache 2 months back  Orders:  -     CBC and differential; Future  -     Comprehensive metabolic panel; Future  -     Lipase; Future  -     Lipid panel; Future  -     CBC and differential  -     Comprehensive metabolic panel  -     Lipase  -     Lipid panel    Hypothyroidism due to Hashimoto's thyroiditis  Comments:  Status post partial thyroidectomy, follows up with endocrinology  Orders:  -     CBC and differential; Future  -     Comprehensive metabolic panel; Future  -     Lipase; Future  -     Lipid panel; Future  -     CBC and differential  -     Comprehensive metabolic panel  -     Lipase  -     Lipid panel    Gastroesophageal reflux disease, unspecified whether esophagitis present  Comments: Follows up with Dr Roberto Winter, last EGD on March 2021 with evidence GERD, on 40 mg pantoprazole daily, advised to complete CT abdomen    Other orders  -     pantoprazole (PROTONIX) 40 mg tablet  -     simethicone (MYLICON) 80 mg chewable tablet; Chew every 8 (eight) hours     plan-    Discussed dietary modifications, weight loss, reduced caffeine intake avoidance of soda and consistent daily exercise for symptom relief  Also discussed maintaining a food diary to make appropriate dietary modifications  Continue pantoprazole and simethicone    Blood work ordered and patient advised to complete CT abdomen and pelvis already ordered by Dr dAe Walls  Advised to follow-up with Dr Ade Walls if symptoms are not improved in the next few days  BMI Counseling: Body mass index is 30 38 kg/m²  The BMI is above normal  Nutrition recommendations include decreasing portion sizes, encouraging healthy choices of fruits and vegetables, decreasing fast food intake, consuming healthier snacks, limiting drinks that contain sugar, moderation in carbohydrate intake, increasing intake of lean protein, reducing intake of saturated and trans fat and reducing intake of cholesterol  Exercise recommendations include moderate physical activity 150 minutes/week  No pharmacotherapy was ordered  Rationale for BMI follow-up plan is due to patient being overweight or obese  There are no Patient Instructions on file for this visit  Subjective:      Patient ID: Osorio Millan is a 57 y o  female    Patient comes for complaints of upper epigastric pain, back pain and flatulence for the past 1 week  Patient has a history of GERD and is on pantoprazole 40 milligrams daily, follows up with Dr Ade Walls, EGD in March 2021 with the reflex  Patient also has a history of asthma and migraine which are currently stable  Has been experiencing a lot of stress at work    Denies any fever, chills, urinary symptoms, nausea vomiting diarrhea constipation hematemesis or blood in the stools        Current Outpatient Medications:     ALPRAZolam (XANAX) 0 25 mg tablet, once as needed  , Disp: , Rfl: 2    ASMANEX 30 METERED DOSES 110 MCG/INH AEPB inhaler, Inhale 1 puff daily, Disp: , Rfl: 1    Brimonidine Tartrate (MIRVASO) 0 33 % GEL, if needed  , Disp: , Rfl:     Cholecalciferol (VITAMIN D-3) 5000 units TABS, , Disp: , Rfl:     diclofenac sodium (Voltaren) 1 %, Voltaren 1 % topical gel  APPLY 2 GRAM TO THE AFFECTED AREA(S) BY TOPICAL ROUTE 4 TIMES PER DAY, Disp: , Rfl:     EPINEPHrine (EPIPEN 2-[place]) 0 3 mg/0 3 mL SOAJ, EpiPen 2-[place] 0 3 mg/0 3 mL injection, auto-injector, Disp: , Rfl:     estradiol (ESTRACE VAGINAL) 0 1 mg/g vaginal cream, As needed , Disp: , Rfl:     Fexofenadine HCl (ALLEGRA PO), take 2 tablet (60MG)  by oral route 2 times every day, Disp: , Rfl:     levalbuterol (XOPENEX HFA) 45 mcg/act inhaler, Inhale 2 puffs every 4 (four) hours as needed, Disp: , Rfl:     levothyroxine (SYNTHROID) 137 mcg tablet, Synthroid 137 mcg tablet, Disp: , Rfl:     levothyroxine 125 mcg tablet, Take 125 mcg by mouth daily  125 mcg & 137 mcg alternate days  , Disp: , Rfl:     losartan (COZAAR) 50 mg tablet, TAKE 1 TABLET DAILY, Disp: 90 tablet, Rfl: 3    mometasone (NASONEX) 50 mcg/act nasal spray, mometasone 50 mcg/actuation nasal spray, Disp: , Rfl:     olopatadine (PATANOL) 0 1 % ophthalmic solution, olopatadine 0 1 % eye drops, Disp: , Rfl:     Restasis 0 05 % ophthalmic emulsion, , Disp: , Rfl:     Salicylic Acid 6 % CREA, As needed , Disp: , Rfl:     simethicone (MYLICON) 80 mg chewable tablet, Chew every 8 (eight) hours, Disp: , Rfl:     SUMAtriptan (IMITREX) 50 mg tablet, Take 1 tablet (50 mg total) by mouth once as needed for migraine for up to 1 dose, Disp: 9 tablet, Rfl: 0    urea (CARMOL) 40 %, urea 40 % topical cream, Disp: , Rfl:     acetaminophen (TYLENOL) 500 mg tablet, Take 500 mg by mouth every 6 (six) hours as needed (Patient not taking: Reported on 9/3/2021), Disp: , Rfl:     ascorbic acid (VITAMIN C) 500 mg tablet, Vitamin C 500 mg tablet  TAKE 1 TABLET DAILY FOR 50 DAYS (Patient not taking: Reported on 7/13/2021), Disp: , Rfl:     Azelastine HCl 0 15 % SOLN, INSERT 2 SPRAYS 2 TIMES DAILY  (Patient not taking: Reported on 7/13/2021), Disp: , Rfl: 0    Calcium Ascorbate 500 MG TABS, TAKE 1 TABLET DAILY FOR 50 DAYS (Patient not taking: Reported on 7/13/2021), Disp: , Rfl:     chlorhexidine (PERIDEX) 0 12 % solution, RINSE 1/2 OUNCE TWICE A DAY FOR 30 SECONDS EXPECTORATE    NO NOT RINSE (Patient not taking: Reported on 7/13/2021), Disp: , Rfl: 0    fluticasone (FLONASE) 50 mcg/act nasal spray, fluticasone propionate 50 mcg/actuation nasal spray,suspension (Patient not taking: Reported on 2/15/2022), Disp: , Rfl:     labetalol (NORMODYNE) 100 mg tablet, , Disp: , Rfl:     Lidocaine Viscous HCl (XYLOCAINE) 2 % mucosal solution, USE 5 ML BY MOUTH 4 TIMES DAILY (BEFORE MEALS AND AT BEDTIME) GARGLE FOR THROAT PAIN  (Patient not taking: Reported on 7/13/2021), Disp: , Rfl: 0    loratadine (CLARITIN) 10 mg tablet, TAKE 1T ABLET BY MOUTH DAILY  (Patient not taking: Reported on 2/15/2022), Disp: , Rfl: 0    ORACEA 40 MG capsule, Take 40 mg by mouth daily (Patient not taking: Reported on 9/3/2021), Disp: , Rfl: 3    pantoprazole (PROTONIX) 40 mg tablet, , Disp: , Rfl:     No results found for this or any previous visit (from the past 1008 hour(s))  The following portions of the patient's history were reviewed and updated as appropriate: allergies, current medications, past family history, past medical history, past social history, past surgical history and problem list      Review of Systems   Constitutional: Negative for appetite change, chills, diaphoresis, fatigue, fever and unexpected weight change  Respiratory: Negative for apnea, cough, choking, chest tightness, shortness of breath, wheezing and stridor  Cardiovascular: Negative for chest pain, palpitations and leg swelling  Gastrointestinal: Positive for abdominal pain  Negative for abdominal distention, anal bleeding, blood in stool, constipation, diarrhea, nausea and vomiting  Genitourinary: Negative for decreased urine volume, difficulty urinating, frequency and urgency  Musculoskeletal: Negative for arthralgias, back pain and myalgias  Neurological: Negative for dizziness, light-headedness, numbness and headaches  Objective:      Vitals:    02/15/22 1125   BP: 136/90   Pulse: 86   Temp: 98 1 °F (36 7 °C)   SpO2: 97%          Physical Exam  Vitals reviewed  Constitutional:       General: She is not in acute distress  Appearance: Normal appearance  She is not ill-appearing, toxic-appearing or diaphoretic  HENT:      Mouth/Throat:      Mouth: Mucous membranes are moist    Cardiovascular:      Rate and Rhythm: Normal rate and regular rhythm  Pulses: Normal pulses  Heart sounds: Normal heart sounds  No murmur heard  No friction rub  No gallop  Pulmonary:      Effort: Pulmonary effort is normal  No respiratory distress  Breath sounds: Normal breath sounds  No stridor  No wheezing, rhonchi or rales  Chest:      Chest wall: No tenderness  Abdominal:      General: There is no distension  Palpations: Abdomen is soft  There is no mass  Tenderness: There is no abdominal tenderness  There is no right CVA tenderness, left CVA tenderness, guarding or rebound  Hernia: No hernia is present  Musculoskeletal:         General: No swelling or tenderness  Right lower leg: No edema  Left lower leg: No edema  Skin:     General: Skin is warm and dry  Findings: No lesion or rash  Neurological:      Mental Status: She is alert and oriented to person, place, and time

## 2022-02-16 DIAGNOSIS — I10 ESSENTIAL HYPERTENSION: ICD-10-CM

## 2022-02-16 RX ORDER — LOSARTAN POTASSIUM 50 MG/1
TABLET ORAL
Qty: 90 TABLET | Refills: 3 | Status: SHIPPED | OUTPATIENT
Start: 2022-02-16

## 2022-03-15 ENCOUNTER — OFFICE VISIT (OUTPATIENT)
Dept: INTERNAL MEDICINE CLINIC | Facility: CLINIC | Age: 62
End: 2022-03-15
Payer: COMMERCIAL

## 2022-03-15 VITALS
SYSTOLIC BLOOD PRESSURE: 124 MMHG | HEART RATE: 67 BPM | HEIGHT: 64 IN | WEIGHT: 179 LBS | TEMPERATURE: 97.8 F | DIASTOLIC BLOOD PRESSURE: 80 MMHG | BODY MASS INDEX: 30.56 KG/M2 | OXYGEN SATURATION: 97 %

## 2022-03-15 DIAGNOSIS — R74.8 ELEVATED ALKALINE PHOSPHATASE LEVEL: ICD-10-CM

## 2022-03-15 DIAGNOSIS — K44.9 HIATAL HERNIA: ICD-10-CM

## 2022-03-15 DIAGNOSIS — K21.9 GASTROESOPHAGEAL REFLUX DISEASE, UNSPECIFIED WHETHER ESOPHAGITIS PRESENT: ICD-10-CM

## 2022-03-15 DIAGNOSIS — E66.9 OBESITY (BMI 30-39.9): Primary | ICD-10-CM

## 2022-03-15 DIAGNOSIS — I10 PRIMARY HYPERTENSION: ICD-10-CM

## 2022-03-15 DIAGNOSIS — E06.3 HYPOTHYROIDISM DUE TO HASHIMOTO'S THYROIDITIS: ICD-10-CM

## 2022-03-15 DIAGNOSIS — G43.109 MIGRAINE WITH AURA AND WITHOUT STATUS MIGRAINOSUS, NOT INTRACTABLE: ICD-10-CM

## 2022-03-15 DIAGNOSIS — E03.8 HYPOTHYROIDISM DUE TO HASHIMOTO'S THYROIDITIS: ICD-10-CM

## 2022-03-15 PROCEDURE — 1036F TOBACCO NON-USER: CPT | Performed by: INTERNAL MEDICINE

## 2022-03-15 PROCEDURE — 3008F BODY MASS INDEX DOCD: CPT | Performed by: INTERNAL MEDICINE

## 2022-03-15 PROCEDURE — 3725F SCREEN DEPRESSION PERFORMED: CPT | Performed by: INTERNAL MEDICINE

## 2022-03-15 PROCEDURE — 99214 OFFICE O/P EST MOD 30 MIN: CPT | Performed by: INTERNAL MEDICINE

## 2022-03-16 PROBLEM — K21.9 GASTROESOPHAGEAL REFLUX DISEASE: Status: ACTIVE | Noted: 2022-03-16

## 2022-03-16 PROBLEM — K44.9 HIATAL HERNIA: Status: ACTIVE | Noted: 2022-03-16

## 2022-03-16 PROBLEM — R74.8 ELEVATED ALKALINE PHOSPHATASE LEVEL: Status: ACTIVE | Noted: 2022-03-16

## 2022-03-16 NOTE — PROGRESS NOTES
Assessment/Plan:    Diagnoses and all orders for this visit:    Obesity (BMI 30-39  9)  Comments:  Encourage lifestyle modification and gym membership  Patient failed weight watchers  Orders:  -     Ambulatory Referral to Nutrition Services; Future    Primary hypertension  Comments:  Blood pressure stable, continue current medication    Elevated alkaline phosphatase level  Comments:  Denies right upper quadrant pain, ultrasound an [place] scan of last year reviewed, will continue to monitor with labs in 6 months    Hypothyroidism due to Hashimoto's thyroiditis  Comments:  Stable continue levothyroxine    Migraine with aura and without status migrainosus, not intractable  Comments:  Has been stable  Follows up with Neurology continue sumatriptan as needed    Gastroesophageal reflux disease, unspecified whether esophagitis present  Comments:  Symptoms are now improved, continue Protonix 40 mg daily    Hiatal hernia           Depression Screening and Follow-up Plan: Patient was screened for depression during today's encounter  They screened negative with a PHQ-2 score of 0  There are no Patient Instructions on file for this visit  Subjective:      Patient ID: Zainab Ge is a 57 y o  female    Patient comes for regular follow-up  Denies any subjective complaints  She wants to lose weight          Current Outpatient Medications:     ALPRAZolam (XANAX) 0 25 mg tablet, once as needed  , Disp: , Rfl: 2    ASMANEX 30 METERED DOSES 110 MCG/INH AEPB inhaler, Inhale 1 puff daily, Disp: , Rfl: 1    Cholecalciferol (VITAMIN D-3) 5000 units TABS, , Disp: , Rfl:     diclofenac sodium (Voltaren) 1 %, Voltaren 1 % topical gel  APPLY 2 GRAM TO THE AFFECTED AREA(S) BY TOPICAL ROUTE 4 TIMES PER DAY, Disp: , Rfl:     EPINEPHrine (EPIPEN 2-TENZIN) 0 3 mg/0 3 mL SOAJ, EpiPen 2-Tenz[place] 0 3 mg/0 3 mL injection, auto-injector, Disp: , Rfl:     estradiol (ESTRACE VAGINAL) 0 1 mg/g vaginal cream, As needed , Disp: , Rfl:    Fexofenadine HCl (ALLEGRA PO), take 2 tablet (60MG)  by oral route 2 times every day, Disp: , Rfl:     levalbuterol (XOPENEX HFA) 45 mcg/act inhaler, Inhale 2 puffs every 4 (four) hours as needed, Disp: , Rfl:     levothyroxine (SYNTHROID) 137 mcg tablet, Synthroid 137 mcg tablet, Disp: , Rfl:     levothyroxine 125 mcg tablet, Take 125 mcg by mouth daily  125 mcg & 137 mcg alternate days  , Disp: , Rfl:     losartan (COZAAR) 50 mg tablet, TAKE 1 TABLET DAILY, Disp: 90 tablet, Rfl: 3    mometasone (NASONEX) 50 mcg/act nasal spray, mometasone 50 mcg/actuation nasal spray, Disp: , Rfl:     olopatadine (PATANOL) 0 1 % ophthalmic solution, olopatadine 0 1 % eye drops, Disp: , Rfl:     pantoprazole (PROTONIX) 40 mg tablet, , Disp: , Rfl:     Salicylic Acid 6 % CREA, As needed , Disp: , Rfl:     SUMAtriptan (IMITREX) 50 mg tablet, Take 1 tablet (50 mg total) by mouth once as needed for migraine for up to 1 dose, Disp: 9 tablet, Rfl: 0    urea (CARMOL) 40 %, urea 40 % topical cream, Disp: , Rfl:     acetaminophen (TYLENOL) 500 mg tablet, Take 500 mg by mouth every 6 (six) hours as needed (Patient not taking: Reported on 9/3/2021), Disp: , Rfl:     ascorbic acid (VITAMIN C) 500 mg tablet, Vitamin C 500 mg tablet  TAKE 1 TABLET DAILY FOR 50 DAYS (Patient not taking: Reported on 7/13/2021), Disp: , Rfl:     Azelastine HCl 0 15 % SOLN, INSERT 2 SPRAYS 2 TIMES DAILY  (Patient not taking: Reported on 7/13/2021), Disp: , Rfl: 0    Brimonidine Tartrate (MIRVASO) 0 33 % GEL, if needed   (Patient not taking: Reported on 3/15/2022 ), Disp: , Rfl:     Calcium Ascorbate 500 MG TABS, TAKE 1 TABLET DAILY FOR 50 DAYS (Patient not taking: Reported on 7/13/2021), Disp: , Rfl:     chlorhexidine (PERIDEX) 0 12 % solution, RINSE 1/2 OUNCE TWICE A DAY FOR 30 SECONDS EXPECTORATE    NO NOT RINSE (Patient not taking: Reported on 7/13/2021), Disp: , Rfl: 0    fluticasone (FLONASE) 50 mcg/act nasal spray, fluticasone propionate 50 mcg/actuation nasal spray,suspension (Patient not taking: Reported on 2/15/2022), Disp: , Rfl:     labetalol (NORMODYNE) 100 mg tablet, , Disp: , Rfl:     Lidocaine Viscous HCl (XYLOCAINE) 2 % mucosal solution, USE 5 ML BY MOUTH 4 TIMES DAILY (BEFORE MEALS AND AT BEDTIME) GARGLE FOR THROAT PAIN  (Patient not taking: Reported on 7/13/2021), Disp: , Rfl: 0    loratadine (CLARITIN) 10 mg tablet, TAKE 1T ABLET BY MOUTH DAILY   (Patient not taking: Reported on 2/15/2022), Disp: , Rfl: 0    ORACEA 40 MG capsule, Take 40 mg by mouth daily (Patient not taking: Reported on 9/3/2021), Disp: , Rfl: 3    Restasis 0 05 % ophthalmic emulsion, , Disp: , Rfl:     simethicone (MYLICON) 80 mg chewable tablet, Chew every 8 (eight) hours (Patient not taking: Reported on 3/15/2022 ), Disp: , Rfl:     Recent Results (from the past 1008 hour(s))   CBC and differential    Collection Time: 02/15/22 12:56 PM   Result Value Ref Range    WBC 6 14 4 31 - 10 16 Thousand/uL    RBC 4 67 3 81 - 5 12 Million/uL    Hemoglobin 13 7 11 5 - 15 4 g/dL    Hematocrit 42 4 34 8 - 46 1 %    MCV 91 82 - 98 fL    MCH 29 3 26 8 - 34 3 pg    MCHC 32 3 31 4 - 37 4 g/dL    RDW 13 1 11 6 - 15 1 %    MPV 11 1 8 9 - 12 7 fL    Platelets 646 096 - 019 Thousands/uL    nRBC 0 /100 WBCs    Neutrophils Relative 70 43 - 75 %    Immat GRANS % 0 0 - 2 %    Lymphocytes Relative 19 14 - 44 %    Monocytes Relative 9 4 - 12 %    Eosinophils Relative 1 0 - 6 %    Basophils Relative 1 0 - 1 %    Neutrophils Absolute 4 31 1 85 - 7 62 Thousands/µL    Immature Grans Absolute 0 01 0 00 - 0 20 Thousand/uL    Lymphocytes Absolute 1 19 0 60 - 4 47 Thousands/µL    Monocytes Absolute 0 53 0 17 - 1 22 Thousand/µL    Eosinophils Absolute 0 06 0 00 - 0 61 Thousand/µL    Basophils Absolute 0 04 0 00 - 0 10 Thousands/µL   Comprehensive metabolic panel    Collection Time: 02/15/22 12:56 PM   Result Value Ref Range    Sodium 140 136 - 145 mmol/L    Potassium 4 0 3 5 - 5 3 mmol/L    Chloride 107 100 - 108 mmol/L    CO2 27 21 - 32 mmol/L    ANION GAP 6 4 - 13 mmol/L    BUN 14 5 - 25 mg/dL    Creatinine 0 69 0 60 - 1 30 mg/dL    Glucose, Fasting 100 (H) 65 - 99 mg/dL    Calcium 9 8 8 3 - 10 1 mg/dL    AST 22 5 - 45 U/L    ALT 26 12 - 78 U/L    Alkaline Phosphatase 128 (H) 46 - 116 U/L    Total Protein 7 3 6 4 - 8 2 g/dL    Albumin 4 0 3 5 - 5 0 g/dL    Total Bilirubin 0 57 0 20 - 1 00 mg/dL    eGFR 93 ml/min/1 73sq m   Lipase    Collection Time: 02/15/22 12:56 PM   Result Value Ref Range    Lipase 46 (L) 73 - 393 u/L   Lipid panel    Collection Time: 02/15/22 12:56 PM   Result Value Ref Range    Cholesterol 184 See Comment mg/dL    Triglycerides 45 See Comment mg/dL    HDL, Direct 77 >=50 mg/dL    LDL Calculated 98 0 - 100 mg/dL    Non-HDL-Chol (CHOL-HDL) 107 mg/dl       The following portions of the patient's history were reviewed and updated as appropriate: allergies, current medications, past family history, past medical history, past social history, past surgical history and problem list      Review of Systems   Constitutional: Negative for appetite change, chills, diaphoresis, fatigue, fever and unexpected weight change  Respiratory: Negative for apnea, cough, choking, chest tightness, shortness of breath, wheezing and stridor  Cardiovascular: Negative for chest pain, palpitations and leg swelling  Gastrointestinal: Negative for abdominal distention, abdominal pain, anal bleeding, blood in stool, constipation, diarrhea, nausea and vomiting  Genitourinary: Negative for decreased urine volume, difficulty urinating, frequency and urgency  Musculoskeletal: Negative for arthralgias, back pain and myalgias  Neurological: Negative for dizziness, light-headedness, numbness and headaches  Objective:      Vitals:    03/15/22 1641   BP: 124/80   Pulse: 67   Temp: 97 8 °F (36 6 °C)   SpO2: 97%          Physical Exam  Vitals reviewed     Constitutional:       General: She is not in acute distress  Appearance: Normal appearance  She is obese  She is not ill-appearing, toxic-appearing or diaphoretic  HENT:      Mouth/Throat:      Mouth: Mucous membranes are moist    Cardiovascular:      Rate and Rhythm: Normal rate and regular rhythm  Pulses: Normal pulses  Heart sounds: Normal heart sounds  No murmur heard  No friction rub  No gallop  Pulmonary:      Effort: Pulmonary effort is normal  No respiratory distress  Breath sounds: Normal breath sounds  No stridor  No wheezing, rhonchi or rales  Chest:      Chest wall: No tenderness  Abdominal:      General: There is no distension  Palpations: Abdomen is soft  There is no mass  Tenderness: There is no abdominal tenderness  There is no rebound  Musculoskeletal:         General: No swelling or tenderness  Right lower leg: No edema  Left lower leg: No edema  Skin:     General: Skin is warm and dry  Findings: No lesion or rash  Neurological:      Mental Status: She is alert and oriented to person, place, and time

## 2022-06-01 ENCOUNTER — RA CDI HCC (OUTPATIENT)
Dept: OTHER | Facility: HOSPITAL | Age: 62
End: 2022-06-01

## 2022-06-01 NOTE — PROGRESS NOTES
[facility]  coding opportunities       Chart reviewed, no opportunity found: CHART REVIEWED, NO OPPORTUNITY FOUND        Patients Insurance        Commercial Insurance: [address] [place] [address]

## 2022-06-16 ENCOUNTER — ANNUAL EXAM (OUTPATIENT)
Dept: OBGYN CLINIC | Facility: CLINIC | Age: 62
End: 2022-06-16
Payer: COMMERCIAL

## 2022-06-16 VITALS
BODY MASS INDEX: 32.28 KG/M2 | DIASTOLIC BLOOD PRESSURE: 80 MMHG | SYSTOLIC BLOOD PRESSURE: 128 MMHG | WEIGHT: 182.2 LBS | HEIGHT: 63 IN

## 2022-06-16 DIAGNOSIS — Z78.0 POSTMENOPAUSAL STATUS: ICD-10-CM

## 2022-06-16 DIAGNOSIS — Z01.419 ENCOUNTER FOR GYNECOLOGICAL EXAMINATION WITHOUT ABNORMAL FINDING: Primary | ICD-10-CM

## 2022-06-16 DIAGNOSIS — Z11.51 SCREENING FOR HPV (HUMAN PAPILLOMAVIRUS): ICD-10-CM

## 2022-06-16 DIAGNOSIS — Z12.4 ENCOUNTER FOR PAPANICOLAOU SMEAR FOR CERVICAL CANCER SCREENING: ICD-10-CM

## 2022-06-16 PROCEDURE — G0476 HPV COMBO ASSAY CA SCREEN: HCPCS | Performed by: NURSE PRACTITIONER

## 2022-06-16 PROCEDURE — G0145 SCR C/V CYTO,THINLAYER,RESCR: HCPCS | Performed by: NURSE PRACTITIONER

## 2022-06-16 PROCEDURE — S0612 ANNUAL GYNECOLOGICAL EXAMINA: HCPCS | Performed by: NURSE PRACTITIONER

## 2022-06-16 NOTE — PROGRESS NOTES
Assessment / Plan    1  Encounter for gynecological examination without abnormal finding  Normal well woman exam  Post ALANA/BSO, benign, supracervical  Pap with hpv updated    2  Encounter for Papanicolaou smear for cervical cancer screening    - Liquid-based pap, screening    3  Screening for HPV (human papillomavirus)    - Liquid-based pap, screening    4  Postmenopausal status  Order provided to check bone density since BSO  She will continue using vaginal estrogen cream    - DXA bone density spine hip and pelvis; Future        Subjective      Yadira Miller is a 57 y o  female who presents for her annual gynecologic exam     56 yo  PMF presents for routine GYN exam    2019 pap/hpv negative  Repeat 2021 mammo negative; scheduled 22  Up to date on colonoscopy- 2019    Had pelvic floor surgery for prolapse/ mixed UI on 21 w/ [facility], Dr Tatiana Cervantes: Stephanie Melchor, colpopexy, RA sacrocolpopexy, s/c hysterectomy, BSO, perineorrhaphy, mid-urethral sling  Doing well  Was using vaginal estrogen but stopped  Periods are absent  Current contraception: status post hysterectomy  History of abnormal Pap smear: no  Family history of breast,uterine, ovarian or colon cancer: yes - mat aunt breast ca, mat GM colon ca    Menstrual History:  OB History        2    Para   2    Term   2            AB        Living   2       SAB        IAB        Ectopic        Multiple        Live Births               Obstetric Comments   Menarche 14  Menopause- does not recall; ALANA/BSO age 57              No LMP recorded (lmp unknown)  Patient is postmenopausal        The following portions of the patient's history were reviewed and updated as appropriate: allergies, current medications, past family history, past medical history, past social history, past surgical history and problem list     Review of Systems      Review of Systems   Constitutional: Negative for chills and fever     Respiratory: Negative for cough and shortness of breath  Gastrointestinal: Negative for abdominal distention, abdominal pain, blood in stool, constipation, diarrhea, nausea and vomiting  Genitourinary: Negative for difficulty urinating, dysuria, frequency, genital sores, hematuria, menstrual problem, pelvic pain, urgency, vaginal bleeding and vaginal discharge  Musculoskeletal: Negative for arthralgias and myalgias  Breasts:  Negative for skin changes, dimpling, asymmetry, nipple discharge, redness, tenderness or palpable masses      Objective      /80 (BP Location: Right arm, Patient Position: Sitting, Cuff Size: Standard)   Ht 5' 3 25" (1 607 m)   Wt 82 6 kg (182 lb 3 2 oz)   LMP  (LMP Unknown)   BMI 32 02 kg/m²   Physical Exam  Constitutional:       General: She is not in acute distress  Appearance: Normal appearance  She is well-developed  She is not ill-appearing or diaphoretic  Comments: bmi 28   HENT:      Head: Normocephalic and atraumatic  Eyes:      Pupils: Pupils are equal, round, and reactive to light  Neck:      Thyroid: No thyromegaly  Pulmonary:      Effort: Pulmonary effort is normal    Chest:   Breasts: Breasts are symmetrical       Right: No inverted nipple, mass, nipple discharge, skin change, tenderness or supraclavicular adenopathy  Left: No inverted nipple, mass, nipple discharge, skin change, tenderness or supraclavicular adenopathy  Abdominal:      General: There is no distension  Palpations: Abdomen is soft  There is no mass  Tenderness: There is no abdominal tenderness  There is no guarding or rebound  Genitourinary:     General: Normal vulva  Exam position: Lithotomy position  Labia:         Right: No rash, tenderness, lesion or injury  Left: No rash, tenderness, lesion or injury  Vagina: No signs of injury and foreign body  No vaginal discharge, erythema, tenderness, bleeding or prolapsed vaginal walls        Cervix: No cervical motion tenderness, discharge or friability  Comments: Uterus absent, ovaries absent  Musculoskeletal:      Cervical back: Neck supple  Lymphadenopathy:      Cervical: No cervical adenopathy  Upper Body:      Right upper body: No supraclavicular adenopathy  Left upper body: No supraclavicular adenopathy  Skin:     General: Skin is warm and dry  Neurological:      General: No focal deficit present  Mental Status: She is alert and oriented to person, place, and time  Psychiatric:         Mood and Affect: Mood normal          Behavior: Behavior normal          Thought Content:  Thought content normal          Judgment: Judgment normal

## 2022-06-16 NOTE — PATIENT INSTRUCTIONS
Weight Management   WHAT YOU NEED TO KNOW:   Being overweight increases your risk of health conditions such as heart disease, high blood pressure, type 2 diabetes, and certain types of cancer  It can also increase your risk for osteoarthritis, sleep apnea, and other respiratory problems  Aim for a slow, steady weight loss  Even a small amount of weight loss can lower your risk of health problems  DISCHARGE INSTRUCTIONS:   How to lose weight safely:  A safe and healthy way to lose weight is to eat fewer calories and get regular exercise  You can lose up about 1 pound a week by decreasing the number of calories you eat by 500 calories each day  You can decrease calories by eating smaller portion sizes or by cutting out high-calorie foods  Read labels to find out how many calories are in the foods you eat  You can also burn calories with exercise such as walking, swimming, or biking  You will be more likely to keep weight off if you make these changes part of your lifestyle  Exercise at least 30 minutes per day on most days of the week  You can also fit in more physical activity by taking the stairs instead of the elevator or parking farther away from stores  Ask your healthcare provider about the best exercise plan for you  Healthy meal plan for weight management:  A healthy meal plan includes a variety of foods, contains fewer calories, and helps you stay healthy  A healthy meal plan includes the following:     Eat whole-grain foods more often  A healthy meal plan should contain fiber  Fiber is the part of grains, fruits, and vegetables that is not broken down by your body  Whole-grain foods are healthy and provide extra fiber in your diet  Some examples of whole-grain foods are whole-wheat breads and pastas, oatmeal, brown rice, and bulgur  Eat a variety of vegetables every day  Include dark, leafy greens such as spinach, kale, amanda greens, and mustard greens   Eat yellow and orange vegetables such as carrots, sweet potatoes, and winter squash  Eat a variety of fruits every day  Choose fresh or canned fruit (canned in its own juice or light syrup) instead of juice  Fruit juice has very little or no fiber  Eat low-fat dairy foods  Drink fat-free (skim) milk or 1% milk  Eat fat-free yogurt and low-fat cottage cheese  Try low-fat cheeses such as mozzarella and other reduced-fat cheeses  Choose meat and other protein foods that are low in fat  Choose beans or other legumes such as split peas or lentils  Choose fish, skinless poultry (chicken or turkey), or lean cuts of red meat (beef or pork)  Before you cook meat or poultry, cut off any visible fat  Use less fat and oil  Try baking foods instead of frying them  Add less fat, such as margarine, sour cream, regular salad dressing, and mayonnaise to foods  Eat fewer high-fat foods  Some examples of high-fat foods include french fries, doughnuts, ice cream, and cakes  Eat fewer sweets  Limit foods and drinks that are high in sugar  This includes candy, cookies, regular soda, and sweetened drinks  Ways to decrease calories:   Eat smaller portions  Use a small plate with smaller servings  Do not eat second helpings  When you eat at a restaurant, ask for a box and place half of your meal in the box before you eat  Share an entrée with someone else  Replace high-calorie snacks with healthy, low-calorie snacks  Choose fresh fruit, vegetables, fat-free rice cakes, or air-popped popcorn instead of potato chips, nuts, or chocolate  Choose water or calorie-free drinks instead of soda or sweetened drinks  Do not shop for groceries when you are hungry  You may be more likely to make unhealthy food choices  Take a grocery list of healthy foods and shop after you have eaten  Eat regular meals  Do not skip meals  Skipping meals can lead to overeating later in the day  This can make it harder for you to lose weight   Eat a healthy snack in place of a meal if you do not have time to eat a regular meal  Talk with a dietitian to help you create a meal plan and schedule that is right for you  Other things to consider as you try to lose weight:   Be aware of situations that may give you the urge to overeat, such as eating while watching television  Find ways to avoid these situations  For example, read a book, go for a walk, or do crafts  Meet with a weight loss support group or friends who are also trying to lose weight  This may help you stay motivated to continue working on your weight loss goals  © Copyright [employer] 2022 Information is for End User's use only and may not be sold, redistributed or otherwise used for commercial purposes  All illustrations and images included in CareNotes® are the copyrighted property of A D A M [employer] Inc  or Shamir Chairez  The above information is an  only  It is not intended as medical advice for individual conditions or treatments  Talk to your doctor, nurse or pharmacist before following any medical regimen to see if it is safe and effective for you

## 2022-06-21 ENCOUNTER — CLINICAL SUPPORT (OUTPATIENT)
Dept: NUTRITION | Facility: HOSPITAL | Age: 62
End: 2022-06-21
Payer: COMMERCIAL

## 2022-06-21 VITALS — WEIGHT: 180 LBS | BODY MASS INDEX: 31.63 KG/M2

## 2022-06-21 DIAGNOSIS — E66.9 OBESITY (BMI 30-39.9): ICD-10-CM

## 2022-06-21 PROCEDURE — 97802 MEDICAL NUTRITION INDIV IN: CPT

## 2022-06-21 NOTE — PROGRESS NOTES
Initial Nutrition Assessment Form    Patient Name: Ke Wilson    YOB: 1960    Sex: Female     Assessment Date: 6/21/2022  Start Time: 11 Stop Time: 12 Total Minutes: 61     Data:  Present at session: self   Parent/Patient Concerns: "I'd like to lose weight I am starting to feel uncomfortable and everything hurts  Medical Dx/Reason for Referral: obesity   Past Medical History:   Diagnosis Date    Allergic rhinitis     Asthma     Disease of thyroid gland     Hashimoto's    Fibroid     Hypertension     Hypothyroidism     Primary osteoarthritis of knee     Left - last assessed: Aug 23, 2017    Seasonal allergies     Varicella        Current Outpatient Medications   Medication Sig Dispense Refill    acetaminophen (TYLENOL) 500 mg tablet Take 500 mg by mouth every 6 (six) hours as needed (Patient not taking: No sig reported)      ALPRAZolam (XANAX) 0 25 mg tablet once as needed    2    ascorbic acid (VITAMIN C) 500 mg tablet Vitamin C 500 mg tablet   TAKE 1 TABLET DAILY FOR 50 DAYS (Patient not taking: No sig reported)      ASMANEX 30 METERED DOSES 110 MCG/INH AEPB inhaler Inhale 1 puff daily  1    Azelastine HCl 0 15 % SOLN INSERT 2 SPRAYS 2 TIMES DAILY  (Patient not taking: No sig reported)  0    Brimonidine Tartrate 0 33 % GEL if needed   (Patient not taking: No sig reported)      Calcium Ascorbate 500 MG TABS TAKE 1 TABLET DAILY FOR 50 DAYS (Patient not taking: No sig reported)      chlorhexidine (PERIDEX) 0 12 % solution RINSE 1/2 OUNCE TWICE A DAY FOR 30 SECONDS EXPECTORATE    NO NOT RINSE (Patient not taking: No sig reported)  0    Cholecalciferol (VITAMIN D-3) 5000 units TABS       diclofenac sodium (VOLTAREN) 1 % Voltaren 1 % topical gel   APPLY 2 GRAM TO THE AFFECTED AREA(S) BY TOPICAL ROUTE 4 TIMES PER DAY      EPINEPHrine (EPIPEN) 0 3 mg/0 3 mL SOAJ EpiPen 2-Tenzin 0 3 mg/0 3 mL injection, auto-injector      estradiol (ESTRACE) 0 1 mg/g vaginal cream As needed       Fexofenadine HCl (ALLEGRA PO) take 2 tablet (60MG)  by oral route 2 times every day      fluticasone (FLONASE) 50 mcg/act nasal spray fluticasone propionate 50 mcg/actuation nasal spray,suspension (Patient not taking: No sig reported)      labetalol (NORMODYNE) 100 mg tablet  (Patient not taking: No sig reported)      levalbuterol (XOPENEX HFA) 45 mcg/act inhaler Inhale 2 puffs every 4 (four) hours as needed      levothyroxine 125 mcg tablet Take 125 mcg by mouth daily 125 mcg & 137 mcg alternate days   levothyroxine 137 mcg tablet Synthroid 137 mcg tablet (Patient not taking: Reported on 6/16/2022)      Lidocaine Viscous HCl (XYLOCAINE) 2 % mucosal solution USE 5 ML BY MOUTH 4 TIMES DAILY (BEFORE MEALS AND AT BEDTIME) GARGLE FOR THROAT PAIN  (Patient not taking: No sig reported)  0    loratadine (CLARITIN) 10 mg tablet TAKE 1T ABLET BY MOUTH DAILY  (Patient not taking: No sig reported)  0    losartan (COZAAR) 50 mg tablet TAKE 1 TABLET DAILY 90 tablet 3    mometasone (NASONEX) 50 mcg/act nasal spray mometasone 50 mcg/actuation nasal spray      olopatadine (PATANOL) 0 1 % ophthalmic solution olopatadine 0 1 % eye drops      ORACEA 40 MG capsule Take 40 mg by mouth daily (Patient not taking: No sig reported)  3    pantoprazole (PROTONIX) 40 mg tablet       Restasis 0 05 % ophthalmic emulsion       Salicylic Acid 6 % CREA As needed       simethicone (MYLICON) 80 mg chewable tablet Chew every 8 (eight) hours (Patient not taking: No sig reported)      SUMAtriptan (IMITREX) 50 mg tablet Take 1 tablet (50 mg total) by mouth once as needed for migraine for up to 1 dose 9 tablet 0    urea (CARMOL) 40 % urea 40 % topical cream       No current facility-administered medications for this visit          Additional Meds/Supplements: n/a   Special Learning Needs: n/a   Height: HC Readings from Last 5 Encounters:   No data found for [facility]       Weight: Wt Readings from Last 10 Encounters:   06/16/22 82 6 kg (182 lb 3 2 oz)   03/15/22 81 2 kg (179 lb)   02/15/22 80 3 kg (177 lb)   11/26/21 77 1 kg (170 lb)   09/03/21 77 6 kg (171 lb 1 6 oz)   07/29/21 77 1 kg (170 lb)   07/22/21 77 1 kg (170 lb)   07/21/21 77 1 kg (170 lb)   07/16/21 77 1 kg (170 lb)   07/15/21 75 3 kg (166 lb)     Estimated body mass index is 32 02 kg/m² as calculated from the following:    Height as of 6/16/22: 5' 3 25" (1 607 m)  Weight as of 6/16/22: 82 6 kg (182 lb 3 2 oz)  Recent Weight Change: [x]Yes     []No  Amount: 16# x 1 year, not desired      Energy Needs: 2065 cals (25cals/kg) - 500cals = 1565cals for 1#/wk wt loss   Allergies   Allergen Reactions    Other Allergic Rhinitis    Pollen Extract-Tree Extract [Pollen Extract] Allergic Rhinitis    NKFA   Social History     Substance and Sexual Activity   Alcohol Use Yes    W/e only 1-2x/wk 1 glass 4oz wine   Social History     Tobacco Use   Smoking Status Never Smoker   Smokeless Tobacco Never Used       Who shops? patient   Who cooks? patient   Exercise: Walking 30-45mins maybe 2 miles-  Just about daily   Prior Counseling? []Yes     [x]No  When:      Why:         Diet Hx: water- 48 oz water daily  Breakfast: Diet:  9-10 Coffee x 10oz creamer and blueberry scone; yogurt x 75 blueberries x1c and high protein granola x 5c (180-200cals) or mueslix   Lunch: 12-1 Salad- dev- chickpeas x  25c 1/2 chicken breast goat cheese sometimes, 1tbsp dressing whole grain crackers (5 crackers x 100cals) lemon yogurt-oikos 120cals nectarine          Dinner: 5 turkey burger potato roll tomato lettuce tzaziki baby carrots cucumbers watermelon- 1 5c and/or yogurt         Snacks: AM -  Refresher drink  cals 1x/wk  Green tea with fruit  PM - 4 pistachios x12 and cheese- 70 jarlsberg or cheddar   HS - none; stops eating at 7pm        Nutrition Diagnosis:   Overweight/obesity  related to Excess energy intake as evidenced by  BMI more than normative standard for age and sex (obesity-grade I 26-30  9) Medical Nutrition Therapy Intervention:  [x]Individualized Meal Plan- discussed the importance of regular meals and snacks, not skipping meals, regular and adequate fluid intake during the day, appropriate meal and snack suggestions, portions per plat method the importance of a variety of foods  []Understanding Lab Values   [x]Basic Pathophysiology of Disease- discussed body composition muscle/fat and the aging process, estrogen also discussed  In terms of overall health stress, fluid, food, activity and sleep were discussed and how they interact and impact lifestyle  []Food/Medication Interactions   []Food Diary [x]Exercise-150mins moderate weekly per guidelines, discussed the importance of regular physical activity on overall health and wt   [x]Lifestyle/Behavior Modification Techniques-adequate sleep of 7-9 hours discussed importance of regular sleep routine and habits on overall health ability to handle stress and it's affect on wt []Medication, Mechanism of Action   []Label Reading []Self Blood Glucose Monitoring   [x]Weight/BMI Goals-short term loss of 2-4# by next visit [x]Other - she does live alone she is ; provided portion control booklet per pt request also provided wt loss guidelines and 1500 cals sample meal plans from AND manual for home use   Other Notes/Assessment:  Angie Diallo is here to lose weight but she just wants to feel better overall, she has done wt watchers previously but got tired of scanning and counting  She does not have a specific goal in mind and just wants to be healthier  She previously had success with controlling portions  Her current sleep habits include 8 hours of sleep nightly, she does not nap  Energy throughout the day is normal   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         Comprehension: []Excellent  []Very Good  [x]Good  []Fair   []Poor    Receptivity: []Excellent  []Very Good  [x]Good  []Fair   []Poor    Expected Compliance: []Excellent  []Very Good  [x]Good  []Fair   []Poor        Goals:  1   1500 cals daily 3 meals and no skipping   2   16 oz fluid with meals and additional 16-32 oz during the day   3  Portions per plate method as discussed with RD       No follow-ups on file    Labs:  CMP  Lab Results   Component Value Date     06/18/2015    K 4 0 02/15/2022     02/15/2022    CO2 27 02/15/2022    ANIONGAP 6 06/18/2015    BUN 14 02/15/2022    CREATININE 0 69 02/15/2022    GLUCOSE 86 06/18/2015    GLUF 100 (H) 02/15/2022    CALCIUM 9 8 02/15/2022    AST 22 02/15/2022    ALT 26 02/15/2022    ALKPHOS 128 (H) 02/15/2022    PROT 7 2 06/18/2015    BILITOT 0 38 06/18/2015    EGFR 93 02/15/2022       BMP  Lab Results   Component Value Date    GLUCOSE 86 06/18/2015    CALCIUM 9 8 02/15/2022     06/18/2015    K 4 0 02/15/2022    CO2 27 02/15/2022     02/15/2022    BUN 14 02/15/2022    CREATININE 0 69 02/15/2022       Lipids  Lab Results   Component Value Date    CHOL 170 06/18/2015     Lab Results   Component Value Date    HDL 77 02/15/2022    HDL 66 06/18/2015     Lab Results   Component Value Date    LDLCALC 98 02/15/2022    LDLCALC 93 06/18/2015     Lab Results   Component Value Date    TRIG 45 02/15/2022    TRIG 53 06/18/2015     No results found for: CHOLHDL    Hemoglobin A1C  Lab Results   Component Value Date    HGBA1C 5 3 07/09/2021       Fasting Glucose  Lab Results   Component Value Date    GLUF 100 (H) 02/15/2022       Insulin     Thyroid  No results found for: TSH, E3FUMKD, O3WRFDC, THYROIDAB    Hepatic Function Panel  Lab Results   Component Value Date    ALT 26 02/15/2022    AST 22 02/15/2022    ALKPHOS 128 (H) 02/15/2022    BILITOT 0 38 06/18/2015       Celiac Disease Antibody Panel  No results found for: ENDOMYSIAL IGA, GLIADIN IGA, GLIADIN IGG, IGA, TISSUE TRANSGLUT AB, TTG IGA   Iron  No results found for: IRON, TIBC, FERRITIN         DSalmon MS RD LDN  [address] [place] [address] [address] [place] [id number]

## 2022-06-22 LAB
LAB AP GYN PRIMARY INTERPRETATION: NORMAL
Lab: NORMAL

## 2022-06-23 ENCOUNTER — OFFICE VISIT (OUTPATIENT)
Dept: INTERNAL MEDICINE CLINIC | Facility: CLINIC | Age: 62
End: 2022-06-23
Payer: COMMERCIAL

## 2022-06-23 VITALS
BODY MASS INDEX: 32.43 KG/M2 | TEMPERATURE: 98.3 F | HEIGHT: 63 IN | WEIGHT: 183 LBS | DIASTOLIC BLOOD PRESSURE: 82 MMHG | HEART RATE: 75 BPM | SYSTOLIC BLOOD PRESSURE: 128 MMHG | OXYGEN SATURATION: 98 %

## 2022-06-23 DIAGNOSIS — Z00.00 ANNUAL PHYSICAL EXAM: Primary | ICD-10-CM

## 2022-06-23 DIAGNOSIS — I10 PRIMARY HYPERTENSION: ICD-10-CM

## 2022-06-23 DIAGNOSIS — E03.8 HYPOTHYROIDISM DUE TO HASHIMOTO'S THYROIDITIS: ICD-10-CM

## 2022-06-23 DIAGNOSIS — M54.6 CHRONIC RIGHT-SIDED THORACIC BACK PAIN: ICD-10-CM

## 2022-06-23 DIAGNOSIS — G89.29 CHRONIC RIGHT-SIDED THORACIC BACK PAIN: ICD-10-CM

## 2022-06-23 DIAGNOSIS — E06.3 HYPOTHYROIDISM DUE TO HASHIMOTO'S THYROIDITIS: ICD-10-CM

## 2022-06-23 PROCEDURE — 3008F BODY MASS INDEX DOCD: CPT | Performed by: INTERNAL MEDICINE

## 2022-06-23 PROCEDURE — 90471 IMMUNIZATION ADMIN: CPT

## 2022-06-23 PROCEDURE — 1036F TOBACCO NON-USER: CPT | Performed by: INTERNAL MEDICINE

## 2022-06-23 PROCEDURE — 90750 HZV VACC RECOMBINANT IM: CPT

## 2022-06-23 PROCEDURE — 99396 PREV VISIT EST AGE 40-64: CPT | Performed by: INTERNAL MEDICINE

## 2022-06-23 RX ORDER — LEVOTHYROXINE SODIUM 112 MCG
TABLET ORAL
COMMUNITY
Start: 2022-06-17

## 2022-06-23 NOTE — PROGRESS NOTES
ADULT ANNUAL [facility] INTERNAL MEDICINE    NAME: Kelsi Llamas  AGE: 57 y o  SEX: female  : 1960     DATE: 2022     Assessment and Plan:     Problem List Items Addressed This Visit        Endocrine    Hypothyroidism due to Hashimoto's thyroiditis    Relevant Medications    Synthroid 112 MCG tablet       Cardiovascular and Mediastinum    Primary hypertension      Other Visit Diagnoses     Annual physical exam    -  Primary    Relevant Orders    Zoster Vaccine Recombinant IM (Completed)    Chronic right-sided thoracic back pain        Previous US RUQ regviewed with the pt - unremarkable, recommend local lidocaine patches         Pt will be getting additional blood work from endocrinology this week     Immunizations and preventive care screenings were discussed with patient today  Appropriate education was printed on patient's after visit summary  Counseling:  · Exercise: the importance of regular exercise/physical activity was discussed  Recommend exercise 3-5 times per week for at least 30 minutes  Return in about 6 months (around 2022)  Chief Complaint:     Chief Complaint   Patient presents with    Physical Exam     Annual physical  Brought blood work results from march and   Shingrex #1      History of Present Illness:     Adult Annual Physical   Patient here for a comprehensive physical exam  The patient reports no problems  Diet and Physical Activity  · Diet/Nutrition: well balanced diet  · Exercise: no formal exercise  Depression Screening  PHQ-2/9 Depression Screening         General Health  · Sleep: sleeps well  · Hearing: normal - bilateral   · Vision: no vision problems  · Dental: regular dental visits  /GYN Health  · Patient is: postmenopausal  · Last menstrual period:   · Contraceptive method:   Audrea Ramming      Review of Systems:     Review of Systems   Constitutional: Negative for appetite change, chills, diaphoresis, fatigue, fever and unexpected weight change  Respiratory: Negative for apnea, cough, choking, chest tightness, shortness of breath, wheezing and stridor  Cardiovascular: Negative for chest pain, palpitations and leg swelling  Gastrointestinal: Negative for abdominal distention, abdominal pain, anal bleeding, blood in stool, constipation, diarrhea, nausea and vomiting  Genitourinary: Negative for decreased urine volume, difficulty urinating, frequency and urgency  Musculoskeletal: Positive for back pain  Negative for arthralgias and myalgias  Neurological: Negative for dizziness, light-headedness, numbness and headaches        Past Medical History:     Past Medical History:   Diagnosis Date    Allergic rhinitis     Asthma     Disease of thyroid gland     Hashimoto's    Fibroid     Hiatal hernia     Hypertension     Hypothyroidism     Primary osteoarthritis of knee     Left - last assessed: Aug 23, 2017    Seasonal allergies     Varicella       Past Surgical History:     Past Surgical History:   Procedure Laterality Date    APPENDECTOMY      COLONOSCOPY  06/30/2014    DIAGNOSTIC LAPAROSCOPY      HAND SURGERY Left     HYSTERECTOMY  06/28/2021    cervix intact    MAMMO (HISTORICAL)  11/26/2016    OOPHORECTOMY Bilateral 06/28/2021    THYROIDECTOMY  1998    partial      Social History:     Social History     Socioeconomic History    Marital status:      Spouse name: None    Number of children: None    Years of education: None    Highest education level: None   Occupational History    None   Tobacco Use    Smoking status: Never Smoker    Smokeless tobacco: Never Used   Vaping Use    Vaping Use: Never used   Substance and Sexual Activity    Alcohol use: Yes     Comment: Occasionally    Drug use: No    Sexual activity: Not Currently     Partners: Male     Birth control/protection: Post-menopausal, Surgical   Other Topics Concern    None Social History Narrative    [facility]:  [facility]     Social Determinants of Health     Financial Resource Strain: Not on file   Food Insecurity: Not on file   Transportation Needs: Not on file   Physical Activity: Not on file   Stress: Not on file   Social Connections: Not on file   Intimate Partner Violence: Not on file   Housing Stability: Not on file      Family History:     Family History   Problem Relation Age of Onset    Hypertension Mother     Hyperlipidemia Mother     Skin cancer Mother     Stroke Mother     Diabetes Father     Heart attack Father     Stroke Father     Skin cancer Father     No Known Problems Daughter     Colon cancer Maternal Grandmother         >50    Breast cancer Maternal Aunt         > 48    No Known Problems Maternal Aunt     No Known Problems Maternal Aunt     Hyperlipidemia Family     Hypertension Family     Heart disease Family     Ovarian cancer Neg Hx       Current Medications:     Current Outpatient Medications   Medication Sig Dispense Refill    ALPRAZolam (XANAX) 0 25 mg tablet once as needed    2    ASMANEX 30 METERED DOSES 110 MCG/INH AEPB inhaler Inhale 1 puff daily  1    Cholecalciferol (VITAMIN D-3) 5000 units TABS       diclofenac sodium (VOLTAREN) 1 % if needed      estradiol (ESTRACE) 0 1 mg/g vaginal cream As needed       Fexofenadine HCl (ALLEGRA PO) take 2 tablet (60MG)  by oral route 2 times every day      levalbuterol (XOPENEX HFA) 45 mcg/act inhaler Inhale 2 puffs every 4 (four) hours as needed      losartan (COZAAR) 50 mg tablet TAKE 1 TABLET DAILY 90 tablet 3    mometasone (NASONEX) 50 mcg/act nasal spray mometasone 50 mcg/actuation nasal spray      olopatadine (PATANOL) 0 1 % ophthalmic solution olopatadine 0 1 % eye drops      pantoprazole (PROTONIX) 40 mg tablet       Salicylic Acid 6 % CREA As needed       SUMAtriptan (IMITREX) 50 mg tablet Take 1 tablet (50 mg total) by mouth once as needed for migraine for up to 1 dose 9 tablet 0    Synthroid 112 MCG tablet       urea (CARMOL) 40 % if needed      acetaminophen (TYLENOL) 500 mg tablet Take 500 mg by mouth every 6 (six) hours as needed (Patient not taking: No sig reported)      ascorbic acid (VITAMIN C) 500 mg tablet Vitamin C 500 mg tablet   TAKE 1 TABLET DAILY FOR 50 DAYS (Patient not taking: No sig reported)      Azelastine HCl 0 15 % SOLN INSERT 2 SPRAYS 2 TIMES DAILY  (Patient not taking: No sig reported)  0    Brimonidine Tartrate 0 33 % GEL if needed   (Patient not taking: No sig reported)      Calcium Ascorbate 500 MG TABS TAKE 1 TABLET DAILY FOR 50 DAYS (Patient not taking: No sig reported)      chlorhexidine (PERIDEX) 0 12 % solution RINSE 1/2 OUNCE TWICE A DAY FOR 30 SECONDS EXPECTORATE   NO NOT RINSE (Patient not taking: No sig reported)  0    EPINEPHrine (EPIPEN) 0 3 mg/0 3 mL SOAJ EpiPen 2-Tenzin 0 3 mg/0 3 mL injection, auto-injector (Patient not taking: Reported on 6/23/2022)      fluticasone (FLONASE) 50 mcg/act nasal spray fluticasone propionate 50 mcg/actuation nasal spray,suspension (Patient not taking: No sig reported)      labetalol (NORMODYNE) 100 mg tablet  (Patient not taking: No sig reported)      levothyroxine 125 mcg tablet Take 125 mcg by mouth daily 125 mcg & 137 mcg alternate days  (Patient not taking: Reported on 6/23/2022)      levothyroxine 137 mcg tablet Synthroid 137 mcg tablet (Patient not taking: No sig reported)      Lidocaine Viscous HCl (XYLOCAINE) 2 % mucosal solution USE 5 ML BY MOUTH 4 TIMES DAILY (BEFORE MEALS AND AT BEDTIME) GARGLE FOR THROAT PAIN  (Patient not taking: No sig reported)  0    loratadine (CLARITIN) 10 mg tablet TAKE 1T ABLET BY MOUTH DAILY   (Patient not taking: No sig reported)  0    ORACEA 40 MG capsule Take 40 mg by mouth daily (Patient not taking: No sig reported)  3    Restasis 0 05 % ophthalmic emulsion  (Patient not taking: Reported on 6/23/2022)      simethicone (Filipe Senna) 80 mg chewable tablet Chew every 8 (eight) hours (Patient not taking: No sig reported)       No current facility-administered medications for this visit  Allergies: Allergies   Allergen Reactions    Other Allergic Rhinitis    Pollen Extract-Tree Extract [Pollen Extract] Allergic Rhinitis      Physical Exam:     /82 (BP Location: Left arm, Patient Position: Sitting, Cuff Size: Standard)   Pulse 75   Temp 98 3 °F (36 8 °C) (Temporal)   Ht 5' 3 25" (1 607 m)   Wt 83 kg (183 lb)   LMP  (LMP Unknown)   SpO2 98% Comment: RA  BMI 32 16 kg/m²     Physical Exam  Constitutional:       General: She is not in acute distress  Appearance: Normal appearance  She is normal weight  She is not ill-appearing, toxic-appearing or diaphoretic  Cardiovascular:      Rate and Rhythm: Normal rate and regular rhythm  Pulses: Normal pulses  Heart sounds: Normal heart sounds  No murmur heard  No gallop  Pulmonary:      Effort: Pulmonary effort is normal  No respiratory distress  Breath sounds: Normal breath sounds  No stridor  No wheezing, rhonchi or rales  Chest:      Chest wall: No tenderness  Abdominal:      General: Abdomen is flat  Bowel sounds are normal  There is no distension  Palpations: Abdomen is soft  There is no mass  Tenderness: There is no abdominal tenderness  There is no guarding  Hernia: No hernia is present  Musculoskeletal:         General: Normal range of motion  Thoracic back: Normal       Lumbar back: Normal    Skin:     General: Skin is warm and dry  Capillary Refill: Capillary refill takes less than 2 seconds  Neurological:      General: No focal deficit present  Mental Status: She is alert            Izabella Lin MD  [facility] INTERNAL MEDICINE

## 2022-06-23 NOTE — PATIENT INSTRUCTIONS
Wellness Visit for Adults   AMBULATORY CARE:   A wellness visit  is when you see your healthcare provider to get screened for health problems  Your healthcare provider will also give you advice on how to stay healthy  Write down your questions so you remember to ask them  Ask your healthcare provider how often you should have a wellness visit  What happens at a wellness visit:  Your healthcare provider will ask about your health, and your family history of health problems  This includes high blood pressure, heart disease, and cancer  He or she will ask if you have symptoms that concern you, if you smoke, and about your mood  You may also be asked about your intake of medicines, supplements, food, and alcohol  Any of the following may be done:  · Your weight  will be checked  Your height may also be checked so your body mass index (BMI) can be calculated  Your BMI shows if you are at a healthy weight  · Your blood pressure  and heart rate will be checked  Your temperature may also be checked  · Blood and urine tests  may be done  Blood tests may be done to check your cholesterol levels  Abnormal cholesterol levels increase your risk for heart disease and stroke  You may also need a blood or urine test to check for diabetes if you are at increased risk  Urine tests may be done to look for signs of an infection or kidney disease  · A physical exam  includes checking your heartbeat and lungs with a stethoscope  Your healthcare provider may also check your skin to look for sun damage  · Screening tests  may be recommended  A screening test is done to check for diseases that may not cause symptoms  The screening tests you may need depend on your age, gender, family history, and lifestyle habits  For example, colorectal screening may be recommended if you are 48years old or older  Screening tests you need if you are a woman:   · A Pap smear  is used to screen for cervical cancer   Pap smears are usually done every 3 to 5 years depending on your age  You may need them more often if you have had abnormal Pap smear test results in the past  Ask your healthcare provider how often you should have a Pap smear  · A mammogram  is an x-ray of your breasts to screen for breast cancer  Experts recommend mammograms every 2 years starting at age 48 years  You may need a mammogram at age 52 years or younger if you have an increased risk for breast cancer  Talk to your healthcare provider about when you should start having mammograms and how often you need them  Vaccines you may need:   · Get an influenza vaccine  every year  The influenza vaccine protects you from the flu  Several types of viruses cause the flu  The viruses change over time, so new vaccines are made each year  · Get a tetanus-diphtheria (Td) booster vaccine  every 10 years  This vaccine protects you against tetanus and diphtheria  Tetanus is a severe infection that may cause painful muscle spasms and lockjaw  Diphtheria is a severe bacterial infection that causes a thick covering in the back of your mouth and throat  · Get a human papillomavirus (HPV) vaccine  if you are female and aged 23 to 32 or male 23 to 24 and never received it  This vaccine protects you from HPV infection  HPV is the most common infection spread by sexual contact  HPV may also cause vaginal, penile, and anal cancers  · Get a pneumococcal vaccine  if you are aged 72 years or older  The pneumococcal vaccine is an injection given to protect you from pneumococcal disease  Pneumococcal disease is an infection caused by pneumococcal bacteria  The infection may cause pneumonia, meningitis, or an ear infection  · Get a shingles vaccine  if you are 60 or older, even if you have had shingles before  The shingles vaccine is an injection to protect you from the varicella-zoster virus  This is the same virus that causes chickenpox   Shingles is a painful rash that develops in people who had chickenpox or have been exposed to the virus  How to eat healthy:  My Plate is a model for planning healthy meals  It shows the types and amounts of foods that should go on your plate  Fruits and vegetables make up about half of your plate, and grains and protein make up the other half  A serving of dairy is included on the side of your plate  The amount of calories and serving sizes you need depends on your age, gender, weight, and height  Examples of healthy foods are listed below:  · Eat a variety of vegetables  such as dark green, red, and orange vegetables  You can also include canned vegetables low in sodium (salt) and frozen vegetables without added butter or sauces  · Eat a variety of fresh fruits , canned fruit in 100% juice, frozen fruit, and dried fruit  · Include whole grains  At least half of the grains you eat should be whole grains  Examples include whole-wheat bread, wheat pasta, brown rice, and whole-grain cereals such as oatmeal     · Eat a variety of protein foods such as seafood (fish and shellfish), lean meat, and poultry without skin (turkey and chicken)  Examples of lean meats include pork leg, shoulder, or tenderloin, and beef round, sirloin, tenderloin, and extra lean ground beef  Other protein foods include eggs and egg substitutes, beans, peas, soy products, nuts, and seeds  · Choose low-fat dairy products such as skim or 1% milk or low-fat yogurt, cheese, and cottage cheese  · Limit unhealthy fats  such as butter, hard margarine, and shortening  Exercise:  Exercise at least 30 minutes per day on most days of the week  Some examples of exercise include walking, biking, dancing, and swimming  You can also fit in more physical activity by taking the stairs instead of the elevator or parking farther away from stores  Include muscle strengthening activities 2 days each week  Regular exercise provides many health benefits   It helps you manage your weight, and decreases your risk for type 2 diabetes, heart disease, stroke, and high blood pressure  Exercise can also help improve your mood  Ask your healthcare provider about the best exercise plan for you  General health and safety guidelines:   · Do not smoke  Nicotine and other chemicals in cigarettes and cigars can cause lung damage  Ask your healthcare provider for information if you currently smoke and need help to quit  E-cigarettes or smokeless tobacco still contain nicotine  Talk to your healthcare provider before you use these products  · Limit alcohol  A drink of alcohol is 12 ounces of beer, 5 ounces of wine, or 1½ ounces of liquor  · Lose weight, if needed  Being overweight increases your risk of certain health conditions  These include heart disease, high blood pressure, type 2 diabetes, and certain types of cancer  · Protect your skin  Do not sunbathe or use tanning beds  Use sunscreen with a SPF 15 or higher  Apply sunscreen at least 15 minutes before you go outside  Reapply sunscreen every 2 hours  Wear protective clothing, hats, and sunglasses when you are outside  · Drive safely  Always wear your seatbelt  Make sure everyone in your car wears a seatbelt  A seatbelt can save your life if you are in an accident  Do not use your cell phone when you are driving  This could distract you and cause an accident  Pull over if you need to make a call or send a text message  · Practice safe sex  Use latex condoms if are sexually active and have more than one partner  Your healthcare provider may recommend screening tests for sexually transmitted infections (STIs)  · Wear helmets, lifejackets, and protective gear  Always wear a helmet when you ride a bike or motorcycle, go skiing, or play sports that could cause a head injury  Wear protective equipment when you play sports  Wear a lifejacket when you are on a boat or doing water sports      © Copyright [employer] 2022 Information is for End User's use only and may not be sold, redistributed or otherwise used for commercial purposes  All illustrations and images included in CareNotes® are the copyrighted property of A D A M , Inc  or Shamir Chairez  The above information is an  only  It is not intended as medical advice for individual conditions or treatments  Talk to your doctor, nurse or pharmacist before following any medical regimen to see if it is safe and effective for you  Obesity   AMBULATORY CARE:   Obesity  means your body mass index (BMI) is greater than 30  Your healthcare provider will use your height and weight to measure your BMI  The risks of obesity include  many health problems, including injuries or physical disability  · Diabetes (high blood sugar level)    · High blood pressure or high cholesterol    · Heart disease    · Stroke    · Gallbladder or liver disease    · Cancer of the colon, breast, prostate, liver, or kidney    · Sleep apnea    · Arthritis or gout    Screening  is done to check for health conditions before you have signs or symptoms  If you are 28to 79years old, your blood sugar level may be checked every 3 years for signs of prediabetes or diabetes  Your healthcare provider will check your blood pressure at each visit  High blood pressure can lead to a stroke or other problems  Your provider may check for signs of heart disease, cancer, or other health problems  Seek care immediately if:   · You have a severe headache, confusion, or difficulty speaking  · You have weakness on one side of your body  · You have chest pain, sweating, or shortness of breath  Call your doctor if:   · You have symptoms of gallbladder or liver disease, such as pain in your upper abdomen  · You have knee or hip pain and discomfort while walking  · You have symptoms of diabetes, such as intense hunger and thirst, and frequent urination      · You have symptoms of sleep apnea, such as snoring or daytime sleepiness  · You have questions or concerns about your condition or care  Treatment for obesity  focuses on helping you lose weight to improve your health  Even a small decrease in BMI can reduce the risk for many health problems  Your healthcare provider will help you set a weight-loss goal   · Lifestyle changes  are the first step in treating obesity  These include making healthy food choices and getting regular physical activity  Your healthcare provider may suggest a weight-loss program that involves coaching, education, and therapy  · Medicine  may help you lose weight when it is used with a healthy foods and physical activity  · Surgery  can help you lose weight if you are very obese and have other health problems  There are several types of weight-loss surgery  Ask your healthcare provider for more information  Tips for safe weight loss:   · Set small, realistic goals  An example of a small goal is to walk for 20 minutes 5 days a week  Anther goal is to lose 5% of your body weight  · Tell friends, family members, and coworkers about your goals  and ask for their support  Ask a friend to lose weight with you, or join a weight-loss support group  · Identify foods or triggers that may cause you to overeat , and find ways to avoid them  Remove tempting high-calorie foods from your home and workplace  Place a bowl of fresh fruit on your kitchen counter  If stress causes you to eat, then find other ways to cope with stress  A counselor or therapist may be able to help you  · Keep a diary to track what you eat and drink  Also write down how many minutes of physical activity you do each day  Weigh yourself once a week and record it in your diary  Eating changes: You will need to eat 500 to 1,000 fewer calories each day than you currently eat to lose 1 to 2 pounds a week  The following changes will help you cut calories:  · Eat smaller portions    Use small plates, no larger than 9 inches in diameter  Fill your plate half full of fruits and vegetables  Measure your food using measuring cups until you know what a serving size looks like  · Eat 3 meals and 1 or 2 snacks each day  Plan your meals in advance  Jo-Ann Jay and eat at home most of the time  Eat slowly  Do not skip meals  Skipping meals can lead to overeating later in the day  This can make it harder for you to lose weight  Talk with a dietitian to help you make a meal plan and schedule that is right for you  · Eat fruits and vegetables at every meal   They are low in calories and high in fiber, which makes you feel full  Do not add butter, margarine, or cream sauce to vegetables  Use herbs to season steamed vegetables  · Eat less fat and fewer fried foods  Eat more baked or grilled chicken and fish  These protein sources are lower in calories and fat than red meat  Limit fast food  Dress your salads with olive oil and vinegar instead of bottled dressing  · Limit the amount of sugar you eat  Do not drink sugary beverages  Limit alcohol  Activity changes:  Physical activity is good for your body in many ways  It helps you burn calories and build strong muscles  It decreases stress and depression, and improves your mood  It can also help you sleep better  Talk to your healthcare provider before you begin an exercise program   · Exercise for at least 30 minutes 5 days a week  Start slowly  Set aside time each day for physical activity that you enjoy and that is convenient for you  It is best to do both weight training and an activity that increases your heart rate, such as walking, bicycling, or swimming  · Find ways to be more active  Do yard work and housecleaning  Walk up the stairs instead of using elevators  Spend your leisure time going to events that require walking, such as outdoor festivals or fairs  This extra physical activity can help you lose weight and keep it off         Follow up with your doctor as directed: You may need to meet with a dietitian  Write down your questions so you remember to ask them during your visits  © Copyright [employer] 2022 Information is for End User's use only and may not be sold, redistributed or otherwise used for commercial purposes  All illustrations and images included in CareNotes® are the copyrighted property of A D A M , Inc  or [facility] J Luis Kline   The above information is an  only  It is not intended as medical advice for individual conditions or treatments  Talk to your doctor, nurse or pharmacist before following any medical regimen to see if it is safe and effective for you

## 2022-06-28 ENCOUNTER — APPOINTMENT (OUTPATIENT)
Dept: LAB | Facility: AMBULARY SURGERY CENTER | Age: 62
End: 2022-06-28
Payer: COMMERCIAL

## 2022-06-28 DIAGNOSIS — E34.9 ENDOCRINE DISORDER RELATED TO PUBERTY: ICD-10-CM

## 2022-06-28 DIAGNOSIS — E89.0 POSTSURGICAL HYPOTHYROIDISM: ICD-10-CM

## 2022-06-28 DIAGNOSIS — I10 ESSENTIAL HYPERTENSION, MALIGNANT: ICD-10-CM

## 2022-06-28 LAB — CORTIS AM PEAK SERPL-MCNC: 16.2 UG/DL (ref 4.2–22.4)

## 2022-06-28 PROCEDURE — 82088 ASSAY OF ALDOSTERONE: CPT

## 2022-06-28 PROCEDURE — 36415 COLL VENOUS BLD VENIPUNCTURE: CPT

## 2022-06-28 PROCEDURE — 84244 ASSAY OF RENIN: CPT

## 2022-06-28 PROCEDURE — 82533 TOTAL CORTISOL: CPT

## 2022-06-28 PROCEDURE — 82024 ASSAY OF ACTH: CPT

## 2022-06-29 LAB — ACTH PLAS-MCNC: 54.7 PG/ML (ref 7.2–63.3)

## 2022-07-03 LAB — RENIN PLAS-CCNC: 0.28 NG/ML/HR (ref 0.17–5.38)

## 2022-07-04 LAB — ALDOST SERPL-MCNC: 13.6 NG/DL (ref 0–30)

## 2022-07-06 ENCOUNTER — HOSPITAL ENCOUNTER (OUTPATIENT)
Dept: BONE DENSITY | Facility: CLINIC | Age: 62
Discharge: HOME | End: 2022-07-06
Payer: COMMERCIAL

## 2022-07-06 DIAGNOSIS — Z13.820 ENCOUNTER FOR SCREENING FOR OSTEOPOROSIS: ICD-10-CM

## 2022-07-06 DIAGNOSIS — Z78.0 POSTMENOPAUSAL STATUS: ICD-10-CM

## 2022-07-06 LAB
ALDOST SERPL-MCNC: 12.4 NG/DL (ref 0–30)
ALDOST/RENIN PLAS-RTO: 33.8 {RATIO} (ref 0–30)
RENIN PLAS-CCNC: 0.37 NG/ML/HR (ref 0.17–5.38)

## 2022-07-06 PROCEDURE — 77080 DXA BONE DENSITY AXIAL: CPT

## 2022-07-21 ENCOUNTER — TELEPHONE (OUTPATIENT)
Dept: INTERNAL MEDICINE CLINIC | Facility: CLINIC | Age: 62
End: 2022-07-21

## 2022-07-30 ENCOUNTER — HOSPITAL ENCOUNTER (OUTPATIENT)
Dept: ULTRASOUND IMAGING | Facility: HOSPITAL | Age: 62
Discharge: HOME | End: 2022-07-30
Attending: SPECIALIST
Payer: COMMERCIAL

## 2022-07-30 DIAGNOSIS — E34.9 ENDOCRINE DISEASE: ICD-10-CM

## 2022-07-30 DIAGNOSIS — I10 ESSENTIAL HYPERTENSION, MALIGNANT: ICD-10-CM

## 2022-07-30 PROCEDURE — 76770 US EXAM ABDO BACK WALL COMP: CPT

## 2022-08-05 ENCOUNTER — HOSPITAL ENCOUNTER (OUTPATIENT)
Dept: RADIOLOGY | Age: 62
Discharge: HOME | End: 2022-08-05
Payer: COMMERCIAL

## 2022-08-05 DIAGNOSIS — E03.9 ACQUIRED HYPOTHYROIDISM: ICD-10-CM

## 2022-08-05 DIAGNOSIS — I10 ESSENTIAL HYPERTENSION, MALIGNANT: ICD-10-CM

## 2022-08-05 PROCEDURE — 74150 CT ABDOMEN W/O CONTRAST: CPT

## 2022-08-05 PROCEDURE — G1004 CDSM NDSC: HCPCS

## 2022-08-08 ENCOUNTER — HOSPITAL ENCOUNTER (OUTPATIENT)
Dept: ULTRASOUND IMAGING | Facility: HOSPITAL | Age: 62
Discharge: HOME | End: 2022-08-08
Attending: SPECIALIST
Payer: COMMERCIAL

## 2022-08-08 DIAGNOSIS — E03.9 ACQUIRED HYPOTHYROIDISM: ICD-10-CM

## 2022-08-08 DIAGNOSIS — I10 ESSENTIAL HYPERTENSION, MALIGNANT: ICD-10-CM

## 2022-08-08 PROCEDURE — 76700 US EXAM ABDOM COMPLETE: CPT

## 2022-09-02 ENCOUNTER — CLINICAL SUPPORT (OUTPATIENT)
Dept: INTERNAL MEDICINE CLINIC | Facility: CLINIC | Age: 62
End: 2022-09-02
Payer: COMMERCIAL

## 2022-09-02 DIAGNOSIS — Z23 ENCOUNTER FOR IMMUNIZATION: Primary | ICD-10-CM

## 2022-09-02 PROCEDURE — 90471 IMMUNIZATION ADMIN: CPT

## 2022-09-02 PROCEDURE — 90750 HZV VACC RECOMBINANT IM: CPT

## 2022-09-12 ENCOUNTER — APPOINTMENT (OUTPATIENT)
Dept: LAB | Facility: AMBULARY SURGERY CENTER | Age: 62
End: 2022-09-12
Payer: COMMERCIAL

## 2022-09-12 DIAGNOSIS — E89.0 POSTSURGICAL HYPOTHYROIDISM: ICD-10-CM

## 2022-09-12 DIAGNOSIS — E34.9 ENDOCRINE DISORDER RELATED TO PUBERTY: ICD-10-CM

## 2022-09-12 LAB
T4 FREE SERPL-MCNC: 1.19 NG/DL (ref 0.76–1.46)
TSH SERPL DL<=0.05 MIU/L-ACNC: 0.2 UIU/ML (ref 0.45–4.5)

## 2022-09-12 PROCEDURE — 84443 ASSAY THYROID STIM HORMONE: CPT

## 2022-09-12 PROCEDURE — 84439 ASSAY OF FREE THYROXINE: CPT

## 2022-09-12 PROCEDURE — 36415 COLL VENOUS BLD VENIPUNCTURE: CPT

## 2022-10-14 ENCOUNTER — VBI (OUTPATIENT)
Dept: ADMINISTRATIVE | Facility: OTHER | Age: 62
End: 2022-10-14

## 2022-11-28 ENCOUNTER — HOSPITAL ENCOUNTER (OUTPATIENT)
Dept: RADIOLOGY | Age: 62
Discharge: HOME | End: 2022-11-28

## 2022-11-28 VITALS — WEIGHT: 175 LBS | BODY MASS INDEX: 31.01 KG/M2 | HEIGHT: 63 IN

## 2022-11-28 DIAGNOSIS — Z12.31 ENCOUNTER FOR SCREENING MAMMOGRAM FOR MALIGNANT NEOPLASM OF BREAST: Primary | ICD-10-CM

## 2022-11-28 DIAGNOSIS — Z12.31 ENCOUNTER FOR SCREENING MAMMOGRAM FOR MALIGNANT NEOPLASM OF BREAST: ICD-10-CM

## 2022-12-22 ENCOUNTER — TELEMEDICINE (OUTPATIENT)
Dept: INTERNAL MEDICINE CLINIC | Facility: CLINIC | Age: 62
End: 2022-12-22

## 2022-12-22 VITALS — BODY MASS INDEX: 30.12 KG/M2 | TEMPERATURE: 98.9 F | HEIGHT: 63 IN | WEIGHT: 170 LBS

## 2022-12-22 DIAGNOSIS — R11.0 NAUSEA: ICD-10-CM

## 2022-12-22 DIAGNOSIS — J06.9 UPPER RESPIRATORY TRACT INFECTION, UNSPECIFIED TYPE: Primary | ICD-10-CM

## 2022-12-22 RX ORDER — ONDANSETRON 4 MG/1
4 TABLET, FILM COATED ORAL EVERY 8 HOURS PRN
Qty: 20 TABLET | Refills: 0 | Status: SHIPPED | OUTPATIENT
Start: 2022-12-22

## 2022-12-22 RX ORDER — ALBUTEROL SULFATE 90 UG/1
AEROSOL, METERED RESPIRATORY (INHALATION)
COMMUNITY
Start: 2022-09-29

## 2022-12-22 RX ORDER — AZITHROMYCIN 250 MG/1
TABLET, FILM COATED ORAL
Qty: 6 TABLET | Refills: 0 | Status: SHIPPED | OUTPATIENT
Start: 2022-12-22 | End: 2022-12-27

## 2022-12-22 RX ORDER — LEVOTHYROXINE SODIUM 100 MCG
TABLET ORAL
COMMUNITY
Start: 2022-12-02

## 2022-12-22 RX ORDER — SODIUM FLUORIDE 6 MG/ML
PASTE, DENTIFRICE DENTAL
COMMUNITY
Start: 2022-11-22

## 2022-12-22 NOTE — PROGRESS NOTES
Virtual Regular Visit    Verification of patient location:    Patient is located in the following state in which I hold an active license [place]      Assessment/Plan:    Problem List Items Addressed This Visit    None  Visit Diagnoses     Upper respiratory tract infection, unspecified type    -  Primary    Relevant Medications    azithromycin (Zithromax) 250 mg tablet    Other Relevant Orders    Covid/Flu- Office Collect    Nausea        Relevant Medications    ondansetron (ZOFRAN) 4 mg tablet               Reason for visit is   Chief Complaint   Patient presents with   • Cold Like Symptoms     my chart  aches , fever chest congestion , a little coughing, nausea,  negative covid test on tues ,   • Virtual Regular Visit        Encounter provider Cam Liao MD    Provider located at [address]  [facility] [address] [phone]      Recent Visits  No visits were found meeting these conditions  Showing recent visits within past 7 days and meeting all other requirements  Today's Visits  Date Type Provider Dept   12/22/22 Telemedicine Cam Liao MD [facility]   Showing today's visits and meeting all other requirements  Future Appointments  No visits were found meeting these conditions  Showing future appointments within next 150 days and meeting all other requirements       The patient was identified by name and date of birth  Roby Washington was informed that this is a telemedicine visit and that the visit is being conducted through the [facility]  She agrees to proceed     My office door was closed  No one else was in the room  She acknowledged consent and understanding of privacy and security of the video platform  The patient has agreed to participate and understands they can discontinue the visit at any time  Patient is aware this is a billable service  Merari Donato is a 60 y o  female    Complains of low-grade fevers, chest congestion coughing and body aches and nausea for the last few days  Home COVID test was negative on Tuesday and today  Will check for flu PCR       Past Medical History:   Diagnosis Date   • Allergic rhinitis    • Asthma    • Disease of thyroid gland     Hashimoto's   • Fibroid    • Hiatal hernia    • Hypertension    • Hypothyroidism    • Primary osteoarthritis of knee     Left - last assessed: Aug 23, 2017   • Seasonal allergies    • Varicella        Past Surgical History:   Procedure Laterality Date   • APPENDECTOMY     • COLONOSCOPY  06/30/2014   • DIAGNOSTIC LAPAROSCOPY     • HAND SURGERY Left    • HYSTERECTOMY  06/28/2021    cervix intact   • MAMMO (HISTORICAL)  11/26/2016   • OOPHORECTOMY Bilateral 06/28/2021   • THYROIDECTOMY  1998    partial       Current Outpatient Medications   Medication Sig Dispense Refill   • acetaminophen (TYLENOL) 500 mg tablet Take 500 mg by mouth every 6 (six) hours as needed     • albuterol (PROVENTIL HFA,VENTOLIN HFA) 90 mcg/act inhaler      • ASMANEX 30 METERED DOSES 110 MCG/INH AEPB inhaler Inhale 1 puff daily  1   • azithromycin (Zithromax) 250 mg tablet Take 2 tablets (500 mg total) by mouth daily for 1 day, THEN 1 tablet (250 mg total) daily for 4 days   6 tablet 0   • Cholecalciferol (VITAMIN D-3) 5000 units TABS      • EPINEPHrine (EPIPEN) 0 3 mg/0 3 mL SOAJ      • Fexofenadine HCl (ALLEGRA PO) take 2 tablet (60MG)  by oral route 2 times every day     • fluticasone (FLONASE) 50 mcg/act nasal spray fluticasone propionate 50 mcg/actuation nasal spray,suspension     • levalbuterol (XOPENEX HFA) 45 mcg/act inhaler Inhale 2 puffs every 4 (four) hours as needed     • losartan (COZAAR) 50 mg tablet TAKE 1 TABLET DAILY 90 tablet 3   • mometasone (NASONEX) 50 mcg/act nasal spray mometasone 50 mcg/actuation nasal spray     • olopatadine (PATANOL) 0 1 % ophthalmic solution olopatadine 0 1 % eye drops     • ondansetron (ZOFRAN) 4 mg tablet Take 1 tablet (4 mg total) by mouth every 8 (eight) hours as needed for nausea or vomiting 20 tablet 0   • pantoprazole (PROTONIX) 40 mg tablet      • PreviDent 5000 Booster Plus 1 1 % PSTE      • Restasis 0 05 % ophthalmic emulsion      • Synthroid 100 MCG tablet      • Synthroid 112 MCG tablet      • ALPRAZolam (XANAX) 0 25 mg tablet once as needed   (Patient not taking: Reported on 12/22/2022)  2   • ascorbic acid (VITAMIN C) 500 mg tablet Vitamin C 500 mg tablet   TAKE 1 TABLET DAILY FOR 50 DAYS (Patient not taking: No sig reported)     • Azelastine HCl 0 15 % SOLN INSERT 2 SPRAYS 2 TIMES DAILY  (Patient not taking: No sig reported)  0   • Brimonidine Tartrate 0 33 % GEL if needed   (Patient not taking: Reported on 3/15/2022)     • Calcium Ascorbate 500 MG TABS TAKE 1 TABLET DAILY FOR 50 DAYS (Patient not taking: No sig reported)     • chlorhexidine (PERIDEX) 0 12 % solution RINSE 1/2 OUNCE TWICE A DAY FOR 30 SECONDS EXPECTORATE   NO NOT RINSE (Patient not taking: No sig reported)  0   • diclofenac sodium (VOLTAREN) 1 % if needed (Patient not taking: Reported on 12/22/2022)     • estradiol (ESTRACE) 0 1 mg/g vaginal cream As needed  (Patient not taking: Reported on 12/22/2022)     • labetalol (NORMODYNE) 100 mg tablet  (Patient not taking: Reported on 7/13/2021)     • levothyroxine 125 mcg tablet Take 125 mcg by mouth daily 125 mcg & 137 mcg alternate days  (Patient not taking: Reported on 6/23/2022)     • levothyroxine 137 mcg tablet Synthroid 137 mcg tablet (Patient not taking: Reported on 6/16/2022)     • Lidocaine Viscous HCl (XYLOCAINE) 2 % mucosal solution USE 5 ML BY MOUTH 4 TIMES DAILY (BEFORE MEALS AND AT BEDTIME) GARGLE FOR THROAT PAIN  (Patient not taking: No sig reported)  0   • loratadine (CLARITIN) 10 mg tablet TAKE 1T ABLET BY MOUTH DAILY   (Patient not taking: No sig reported)  0   • ORACEA 40 MG capsule Take 40 mg by mouth daily (Patient not taking: Reported on 5/5/0296)  3   • Salicylic Acid 6 % CREA As needed  (Patient not taking: Reported on 12/22/2022)     • simethicone (MYLICON) 80 mg chewable tablet Chew every 8 (eight) hours (Patient not taking: Reported on 3/15/2022)     • SUMAtriptan (IMITREX) 50 mg tablet Take 1 tablet (50 mg total) by mouth once as needed for migraine for up to 1 dose (Patient not taking: Reported on 12/22/2022) 9 tablet 0   • urea (CARMOL) 40 % if needed (Patient not taking: Reported on 12/22/2022)       No current facility-administered medications for this visit  Allergies   Allergen Reactions   • Other Allergic Rhinitis   • Pollen Extract-Tree Extract [Pollen Extract] Allergic Rhinitis       Review of Systems   Constitutional: Positive for fever  Negative for activity change, appetite change, chills, diaphoresis, fatigue and unexpected weight change  HENT: Positive for congestion, sinus pressure and sinus pain  Negative for drooling, ear discharge, ear pain, facial swelling, hearing loss, postnasal drip, rhinorrhea, sneezing, sore throat, tinnitus, trouble swallowing and voice change  Eyes: Negative for discharge  Respiratory: Positive for cough  Negative for apnea, choking, chest tightness, shortness of breath, wheezing and stridor  Cardiovascular: Negative for chest pain, palpitations and leg swelling  Gastrointestinal: Negative for abdominal distention, abdominal pain, anal bleeding, blood in stool, constipation, diarrhea, nausea and vomiting  Genitourinary: Negative for decreased urine volume, difficulty urinating, dysuria, frequency and urgency  Musculoskeletal: Negative for arthralgias, back pain and myalgias  Skin: Negative for color change  Neurological: Negative for dizziness, light-headedness, numbness and headaches         Video Exam    Vitals:    12/22/22 1139   Temp: 98 9 °F (37 2 °C)   TempSrc: Oral   Weight: 77 1 kg (170 lb)   Height: 5' 3 25" (1 607 m)       Physical Exam  Constitutional:       General: She is not in acute distress  Appearance: Normal appearance  She is not ill-appearing, toxic-appearing or diaphoretic  Neurological:      Mental Status: She is alert            I spent 20 minutes directly with the patient during this visit

## 2022-12-23 LAB
FLUAV RNA RESP QL NAA+PROBE: POSITIVE
FLUBV RNA RESP QL NAA+PROBE: NEGATIVE
SARS-COV-2 RNA RESP QL NAA+PROBE: NEGATIVE

## 2022-12-27 ENCOUNTER — TELEPHONE (OUTPATIENT)
Dept: INTERNAL MEDICINE CLINIC | Facility: CLINIC | Age: 62
End: 2022-12-27

## 2022-12-27 NOTE — TELEPHONE ENCOUNTER
patient called she is having problems with her blood pressure medicine and would like to go over them she is not sure if she should stop taking or keep taking them  her pressure was 99/60 could call her

## 2022-12-28 ENCOUNTER — OFFICE VISIT (OUTPATIENT)
Dept: INTERNAL MEDICINE CLINIC | Facility: CLINIC | Age: 62
End: 2022-12-28

## 2022-12-28 VITALS
SYSTOLIC BLOOD PRESSURE: 142 MMHG | TEMPERATURE: 99.1 F | HEART RATE: 76 BPM | BODY MASS INDEX: 29.77 KG/M2 | WEIGHT: 168 LBS | OXYGEN SATURATION: 97 % | DIASTOLIC BLOOD PRESSURE: 88 MMHG | HEIGHT: 63 IN

## 2022-12-28 DIAGNOSIS — I10 PRIMARY HYPERTENSION: ICD-10-CM

## 2022-12-28 DIAGNOSIS — J11.1 FLU: Primary | ICD-10-CM

## 2022-12-29 NOTE — PROGRESS NOTES
Assessment/Plan:    Diagnoses and all orders for this visit:    Flu    Primary hypertension       Patient brings home BP monitoring in the last few days which are above normal in the range of 140/86  Patient had noticed it during her flu illness last week her blood pressures were less than 100 and was holding off on taking losartan  Patient reassured that she needs to resume taking losartan because now blood pressures have increased  Continue home BP monitoring, follow-up in 3 to 4 months         There are no Patient Instructions on file for this visit      Subjective:      Patient ID: Skye Witt is a 60 y o  female    Patient comes for follow-up of her blood pressure and recent flu        Current Outpatient Medications:   •  ASMANEX 30 METERED DOSES 110 MCG/INH AEPB inhaler, Inhale 1 puff daily, Disp: , Rfl: 1  •  Cholecalciferol (VITAMIN D-3) 5000 units TABS, , Disp: , Rfl:   •  Fexofenadine HCl (ALLEGRA PO), take 2 tablet (60MG)  by oral route 2 times every day, Disp: , Rfl:   •  fluticasone (FLONASE) 50 mcg/act nasal spray, fluticasone propionate 50 mcg/actuation nasal spray,suspension, Disp: , Rfl:   •  losartan (COZAAR) 50 mg tablet, TAKE 1 TABLET DAILY, Disp: 90 tablet, Rfl: 3  •  mometasone (NASONEX) 50 mcg/act nasal spray, mometasone 50 mcg/actuation nasal spray, Disp: , Rfl:   •  olopatadine (PATANOL) 0 1 % ophthalmic solution, olopatadine 0 1 % eye drops, Disp: , Rfl:   •  ondansetron (ZOFRAN) 4 mg tablet, Take 1 tablet (4 mg total) by mouth every 8 (eight) hours as needed for nausea or vomiting, Disp: 20 tablet, Rfl: 0  •  pantoprazole (PROTONIX) 40 mg tablet, , Disp: , Rfl:   •  PreviDent 5000 Booster Plus 1 1 % PSTE, , Disp: , Rfl:   •  Restasis 0 05 % ophthalmic emulsion, , Disp: , Rfl:   •  SUMAtriptan (IMITREX) 50 mg tablet, Take 1 tablet (50 mg total) by mouth once as needed for migraine for up to 1 dose, Disp: 9 tablet, Rfl: 0  •  Synthroid 100 MCG tablet, , Disp: , Rfl:   •  Synthroid 112 MCG tablet, , Disp: , Rfl:   •  acetaminophen (TYLENOL) 500 mg tablet, Take 500 mg by mouth every 6 (six) hours as needed, Disp: , Rfl:   •  albuterol (PROVENTIL HFA,VENTOLIN HFA) 90 mcg/act inhaler, , Disp: , Rfl:   •  ALPRAZolam (XANAX) 0 25 mg tablet, once as needed, Disp: , Rfl: 2  •  ascorbic acid (VITAMIN C) 500 mg tablet, Vitamin C 500 mg tablet  TAKE 1 TABLET DAILY FOR 50 DAYS, Disp: , Rfl:   •  Azelastine HCl 0 15 % SOLN, INSERT 2 SPRAYS 2 TIMES DAILY  , Disp: , Rfl: 0  •  Brimonidine Tartrate 0 33 % GEL, if needed, Disp: , Rfl:   •  Calcium Ascorbate 500 MG TABS, TAKE 1 TABLET DAILY FOR 50 DAYS, Disp: , Rfl:   •  chlorhexidine (PERIDEX) 0 12 % solution, RINSE 1/2 OUNCE TWICE A DAY FOR 30 SECONDS EXPECTORATE   NO NOT RINSE, Disp: , Rfl: 0  •  diclofenac sodium (VOLTAREN) 1 %, if needed, Disp: , Rfl:   •  EPINEPHrine (EPIPEN) 0 3 mg/0 3 mL SOAJ, , Disp: , Rfl:   •  estradiol (ESTRACE) 0 1 mg/g vaginal cream, As needed, Disp: , Rfl:   •  labetalol (NORMODYNE) 100 mg tablet, , Disp: , Rfl:   •  levalbuterol (XOPENEX HFA) 45 mcg/act inhaler, Inhale 2 puffs every 4 (four) hours as needed, Disp: , Rfl:   •  levothyroxine 125 mcg tablet, Take 125 mcg by mouth daily 125 mcg & 137 mcg alternate days  , Disp: , Rfl:   •  levothyroxine 137 mcg tablet, , Disp: , Rfl:   •  Lidocaine Viscous HCl (XYLOCAINE) 2 % mucosal solution, USE 5 ML BY MOUTH 4 TIMES DAILY (BEFORE MEALS AND AT BEDTIME) GARGLE FOR THROAT PAIN , Disp: , Rfl: 0  •  loratadine (CLARITIN) 10 mg tablet, TAKE 1T ABLET BY MOUTH DAILY  , Disp: , Rfl: 0  •  ORACEA 40 MG capsule, Take 40 mg by mouth daily, Disp: , Rfl: 3  •  Salicylic Acid 6 % CREA, As needed, Disp: , Rfl:   •  simethicone (MYLICON) 80 mg chewable tablet, Chew every 8 (eight) hours, Disp: , Rfl:   •  urea (CARMOL) 40 %, if needed, Disp: , Rfl:      Past Medical History:   Diagnosis Date   • Allergic rhinitis    • Asthma    • Disease of thyroid gland     Hashimoto's   • Fibroid    • Hiatal hernia    • Hypertension    • Hypothyroidism    • Primary osteoarthritis of knee     Left - last assessed: Aug 23, 2017   • Seasonal allergies    • Varicella          Past Surgical History:   Procedure Laterality Date   • APPENDECTOMY     • COLONOSCOPY  06/30/2014   • DIAGNOSTIC LAPAROSCOPY     • HAND SURGERY Left    • HYSTERECTOMY  06/28/2021    cervix intact   • MAMMO (HISTORICAL)  11/26/2016   • OOPHORECTOMY Bilateral 06/28/2021   • THYROIDECTOMY  1998    partial         Allergies   Allergen Reactions   • Other Allergic Rhinitis   • Pollen Extract-Tree Extract [Pollen Extract] Allergic Rhinitis       Recent Results (from the past 1008 hour(s))   Covid/Flu- Office Collect    Collection Time: 12/22/22  3:56 PM    Specimen: Nose; Nares   Result Value Ref Range    SARS-CoV-2 Negative Negative    INFLUENZA A PCR Positive (A) Negative    INFLUENZA B PCR Negative Negative       The following portions of the patient's history were reviewed and updated as appropriate: allergies, current medications, past family history, past medical history, past social history, past surgical history and problem list      Review of Systems   Constitutional: Negative for activity change, appetite change, chills, diaphoresis, fatigue, fever and unexpected weight change  HENT: Positive for congestion  Negative for drooling, ear discharge, ear pain, facial swelling, hearing loss, postnasal drip, rhinorrhea, sinus pressure, sinus pain, sneezing, sore throat, tinnitus, trouble swallowing and voice change  Eyes: Negative for discharge  Respiratory: Positive for cough  Negative for apnea, choking, chest tightness, shortness of breath, wheezing and stridor  Cardiovascular: Negative for chest pain, palpitations and leg swelling  Gastrointestinal: Negative for abdominal distention, abdominal pain, anal bleeding, blood in stool, constipation, diarrhea, nausea and vomiting     Genitourinary: Negative for decreased urine volume, difficulty urinating, frequency and urgency  Musculoskeletal: Negative for arthralgias, back pain and myalgias  Skin: Negative for color change  Neurological: Negative for dizziness, light-headedness, numbness and headaches  Objective:      Vitals:    12/28/22 1620   BP: 142/88   Pulse: 76   Temp: 99 1 °F (37 3 °C)   SpO2: 97%          Physical Exam  Vitals reviewed  Constitutional:       General: She is not in acute distress  Appearance: Normal appearance  She is not ill-appearing, toxic-appearing or diaphoretic  HENT:      Mouth/Throat:      Mouth: Mucous membranes are moist    Cardiovascular:      Rate and Rhythm: Normal rate and regular rhythm  Pulses: Normal pulses  Heart sounds: Normal heart sounds  No murmur heard  No friction rub  No gallop  Pulmonary:      Effort: Pulmonary effort is normal  No respiratory distress  Breath sounds: Normal breath sounds  No stridor  No wheezing, rhonchi or rales  Chest:      Chest wall: No tenderness  Musculoskeletal:      Right lower leg: No edema  Left lower leg: No edema  Skin:     General: Skin is warm and dry  Findings: No lesion or rash  Neurological:      Mental Status: She is alert

## 2023-01-16 ENCOUNTER — APPOINTMENT (OUTPATIENT)
Dept: LAB | Facility: AMBULARY SURGERY CENTER | Age: 63
End: 2023-01-16

## 2023-01-16 ENCOUNTER — TRANSCRIBE ORDERS (OUTPATIENT)
Dept: LAB | Facility: AMBULARY SURGERY CENTER | Age: 63
End: 2023-01-16

## 2023-01-16 DIAGNOSIS — I10 ESSENTIAL HYPERTENSION, MALIGNANT: ICD-10-CM

## 2023-01-16 DIAGNOSIS — E89.0 POSTSURGICAL HYPOTHYROIDISM: ICD-10-CM

## 2023-01-16 DIAGNOSIS — E34.9 MULTIPLE ENDOCRINE DEFICIENCY SYNDROME: ICD-10-CM

## 2023-01-16 DIAGNOSIS — E34.9 MULTIPLE ENDOCRINE DEFICIENCY SYNDROME: Primary | ICD-10-CM

## 2023-01-16 LAB
ALBUMIN SERPL BCP-MCNC: 3.6 G/DL (ref 3.5–5)
ALP SERPL-CCNC: 117 U/L (ref 46–116)
ALT SERPL W P-5'-P-CCNC: 20 U/L (ref 12–78)
ANION GAP SERPL CALCULATED.3IONS-SCNC: 4 MMOL/L (ref 4–13)
AST SERPL W P-5'-P-CCNC: 14 U/L (ref 5–45)
BASOPHILS # BLD AUTO: 0.03 THOUSANDS/ÂΜL (ref 0–0.1)
BASOPHILS NFR BLD AUTO: 1 % (ref 0–1)
BILIRUB SERPL-MCNC: 0.65 MG/DL (ref 0.2–1)
BILIRUB UR QL STRIP: NEGATIVE
BUN SERPL-MCNC: 17 MG/DL (ref 5–25)
CALCIUM SERPL-MCNC: 9.3 MG/DL (ref 8.3–10.1)
CHLORIDE SERPL-SCNC: 107 MMOL/L (ref 96–108)
CLARITY UR: CLEAR
CO2 SERPL-SCNC: 28 MMOL/L (ref 21–32)
COLOR UR: COLORLESS
CREAT SERPL-MCNC: 0.66 MG/DL (ref 0.6–1.3)
EOSINOPHIL # BLD AUTO: 0.14 THOUSAND/ÂΜL (ref 0–0.61)
EOSINOPHIL NFR BLD AUTO: 4 % (ref 0–6)
ERYTHROCYTE [DISTWIDTH] IN BLOOD BY AUTOMATED COUNT: 13.3 % (ref 11.6–15.1)
EST. AVERAGE GLUCOSE BLD GHB EST-MCNC: 114 MG/DL
GFR SERPL CREATININE-BSD FRML MDRD: 94 ML/MIN/1.73SQ M
GLUCOSE P FAST SERPL-MCNC: 91 MG/DL (ref 65–99)
GLUCOSE UR STRIP-MCNC: NEGATIVE MG/DL
HBA1C MFR BLD: 5.6 %
HCT VFR BLD AUTO: 41 % (ref 34.8–46.1)
HGB BLD-MCNC: 13.6 G/DL (ref 11.5–15.4)
HGB UR QL STRIP.AUTO: NEGATIVE
IMM GRANULOCYTES # BLD AUTO: 0 THOUSAND/UL (ref 0–0.2)
IMM GRANULOCYTES NFR BLD AUTO: 0 % (ref 0–2)
KETONES UR STRIP-MCNC: NEGATIVE MG/DL
LEUKOCYTE ESTERASE UR QL STRIP: NEGATIVE
LYMPHOCYTES # BLD AUTO: 1.39 THOUSANDS/ÂΜL (ref 0.6–4.47)
LYMPHOCYTES NFR BLD AUTO: 38 % (ref 14–44)
MAGNESIUM SERPL-MCNC: 2.6 MG/DL (ref 1.6–2.6)
MCH RBC QN AUTO: 30.1 PG (ref 26.8–34.3)
MCHC RBC AUTO-ENTMCNC: 33.2 G/DL (ref 31.4–37.4)
MCV RBC AUTO: 91 FL (ref 82–98)
MONOCYTES # BLD AUTO: 0.5 THOUSAND/ÂΜL (ref 0.17–1.22)
MONOCYTES NFR BLD AUTO: 14 % (ref 4–12)
NEUTROPHILS # BLD AUTO: 1.59 THOUSANDS/ÂΜL (ref 1.85–7.62)
NEUTS SEG NFR BLD AUTO: 43 % (ref 43–75)
NITRITE UR QL STRIP: NEGATIVE
NRBC BLD AUTO-RTO: 0 /100 WBCS
PH UR STRIP.AUTO: 6.5 [PH]
PHOSPHATE SERPL-MCNC: 4.1 MG/DL (ref 2.3–4.1)
PLATELET # BLD AUTO: 302 THOUSANDS/UL (ref 149–390)
PMV BLD AUTO: 10.7 FL (ref 8.9–12.7)
POTASSIUM SERPL-SCNC: 4.2 MMOL/L (ref 3.5–5.3)
PROT SERPL-MCNC: 7.3 G/DL (ref 6.4–8.4)
PROT UR STRIP-MCNC: NEGATIVE MG/DL
RBC # BLD AUTO: 4.52 MILLION/UL (ref 3.81–5.12)
SODIUM SERPL-SCNC: 139 MMOL/L (ref 135–147)
SP GR UR STRIP.AUTO: 1.01 (ref 1–1.03)
TSH SERPL DL<=0.05 MIU/L-ACNC: 0.52 UIU/ML (ref 0.45–4.5)
UROBILINOGEN UR STRIP-ACNC: <2 MG/DL
WBC # BLD AUTO: 3.65 THOUSAND/UL (ref 4.31–10.16)

## 2023-02-15 DIAGNOSIS — I10 ESSENTIAL HYPERTENSION: ICD-10-CM

## 2023-02-15 RX ORDER — LOSARTAN POTASSIUM 50 MG/1
50 TABLET ORAL DAILY
Qty: 90 TABLET | Refills: 3 | Status: SHIPPED | OUTPATIENT
Start: 2023-02-15

## 2023-03-03 ENCOUNTER — TELEMEDICINE (OUTPATIENT)
Dept: INTERNAL MEDICINE CLINIC | Facility: CLINIC | Age: 63
End: 2023-03-03

## 2023-03-03 DIAGNOSIS — R11.10 INTRACTABLE VOMITING: Primary | ICD-10-CM

## 2023-03-03 NOTE — PROGRESS NOTES
Virtual Regular Visit    Verification of patient location:    Patient is located in the following state in which I hold an active license [place]      Assessment/Plan:    Problem List Items Addressed This Visit    None  Visit Diagnoses     Intractable vomiting    -  Primary        Continue Zofran as needed, encouraged hydration  Continue to monitor symptoms and blood pressure, hold off on antihypertensives, if symptoms persist or worsen, patient understands that she has to go to the ER       Reason for visit is   Chief Complaint   Patient presents with   • Nausea     vomiting since last night, ear and sinus pain  my chart active   • HM     Depression sc, BMI         Encounter provider Celestino Bean MD    Provider located at [address] [address] [phone]      Recent Visits  No visits were found meeting these conditions  Showing recent visits within past 7 days and meeting all other requirements  Today's Visits  Date Type Provider Dept   03/03/23 Telemedicine Celestino Bean MD [facility]  [address] [facility] [address] [place] [address] today's visits and meeting all other requirements  Future Appointments  No visits were found meeting these conditions  Showing future appointments within next 150 days and meeting all other requirements       The patient was identified by name and date of birth  Poly Sandy was informed that this is a telemedicine visit and that the visit is being conducted through the [facility]  She agrees to proceed     My office door was closed  No one else was in the room  She acknowledged consent and understanding of privacy and security of the video platform  The patient has agreed to participate and understands they can discontinue the visit at any time  Patient is aware this is a billable service       Reagan Santiago is a 60 y o  female        Complains of multiple episodes of voluminous vomiting since last night  Vomiting has stopped since 9 PM after she took Zofran from previous prescriptions  Complains of nausea  Able to take few sips of fluids since this morning  No complaints of fever, chest pain, shortness of breath, abdominal pain, constipation, diarrhea, blood in the vomitus, urinary symptoms  Multiple work colleagues sick with similar complaints, ate at [employer] on Wednesday night       Past Medical History:   Diagnosis Date   • Allergic rhinitis    • Asthma    • Disease of thyroid gland     Hashimoto's   • Fibroid    • Hiatal hernia    • Hypertension    • Hypothyroidism    • Primary osteoarthritis of knee     Left - last assessed: Aug 23, 2017   • Seasonal allergies    • Varicella        Past Surgical History:   Procedure Laterality Date   • APPENDECTOMY     • COLONOSCOPY  06/30/2014   • DIAGNOSTIC LAPAROSCOPY     • HAND SURGERY Left    • HYSTERECTOMY  06/28/2021    cervix intact   • MAMMO (HISTORICAL)  11/26/2016   • OOPHORECTOMY Bilateral 06/28/2021   • THYROIDECTOMY  1998    partial       Current Outpatient Medications   Medication Sig Dispense Refill   • acetaminophen (TYLENOL) 500 mg tablet Take 500 mg by mouth every 6 (six) hours as needed     • albuterol (PROVENTIL HFA,VENTOLIN HFA) 90 mcg/act inhaler      • ALPRAZolam (XANAX) 0 25 mg tablet once as needed  2   • ascorbic acid (VITAMIN C) 500 mg tablet Vitamin C 500 mg tablet   TAKE 1 TABLET DAILY FOR 50 DAYS     • ASMANEX 30 METERED DOSES 110 MCG/INH AEPB inhaler Inhale 1 puff daily  1   • Azelastine HCl 0 15 % SOLN INSERT 2 SPRAYS 2 TIMES DAILY  0   • Brimonidine Tartrate 0 33 % GEL if needed     • Calcium Ascorbate 500 MG TABS TAKE 1 TABLET DAILY FOR 50 DAYS     • chlorhexidine (PERIDEX) 0 12 % solution RINSE 1/2 OUNCE TWICE A DAY FOR 30 SECONDS EXPECTORATE    NO NOT RINSE  0   • Cholecalciferol (VITAMIN D-3) 5000 units TABS      • diclofenac sodium (VOLTAREN) 1 % if needed     • EPINEPHrine (EPIPEN) 0 3 mg/0 3 mL SOAJ      • estradiol (ESTRACE) 0 1 mg/g vaginal cream As needed     • Fexofenadine HCl (ALLEGRA PO) take 2 tablet (60MG)  by oral route 2 times every day     • fluticasone (FLONASE) 50 mcg/act nasal spray fluticasone propionate 50 mcg/actuation nasal spray,suspension     • labetalol (NORMODYNE) 100 mg tablet      • levalbuterol (XOPENEX HFA) 45 mcg/act inhaler Inhale 2 puffs every 4 (four) hours as needed     • levothyroxine 125 mcg tablet Take 125 mcg by mouth daily 125 mcg & 137 mcg alternate days  • levothyroxine 137 mcg tablet      • Lidocaine Viscous HCl (XYLOCAINE) 2 % mucosal solution USE 5 ML BY MOUTH 4 TIMES DAILY (BEFORE MEALS AND AT BEDTIME) GARGLE FOR THROAT PAIN   0   • loratadine (CLARITIN) 10 mg tablet TAKE 1T ABLET BY MOUTH DAILY  0   • losartan (COZAAR) 50 mg tablet Take 1 tablet (50 mg total) by mouth daily 90 tablet 3   • mometasone (NASONEX) 50 mcg/act nasal spray mometasone 50 mcg/actuation nasal spray     • olopatadine (PATANOL) 0 1 % ophthalmic solution olopatadine 0 1 % eye drops     • ondansetron (ZOFRAN) 4 mg tablet Take 1 tablet (4 mg total) by mouth every 8 (eight) hours as needed for nausea or vomiting 20 tablet 0   • ORACEA 40 MG capsule Take 40 mg by mouth daily  3   • pantoprazole (PROTONIX) 40 mg tablet      • PreviDent 5000 Booster Plus 1 1 % PSTE      • Restasis 0 05 % ophthalmic emulsion      • Salicylic Acid 6 % CREA As needed     • simethicone (MYLICON) 80 mg chewable tablet Chew every 8 (eight) hours     • SUMAtriptan (IMITREX) 50 mg tablet Take 1 tablet (50 mg total) by mouth once as needed for migraine for up to 1 dose 9 tablet 0   • Synthroid 100 MCG tablet      • Synthroid 112 MCG tablet      • urea (CARMOL) 40 % if needed       No current facility-administered medications for this visit          Allergies   Allergen Reactions   • Other Allergic Rhinitis   • Pollen Extract-Tree Extract [Pollen Extract] Allergic Rhinitis Review of Systems   Constitutional: Negative for appetite change, chills, diaphoresis, fatigue, fever and unexpected weight change  HENT: Negative for congestion  Respiratory: Negative for apnea, cough, choking, chest tightness, shortness of breath, wheezing and stridor  Cardiovascular: Negative for chest pain, palpitations and leg swelling  Gastrointestinal: Positive for nausea and vomiting  Negative for abdominal distention, abdominal pain, anal bleeding, blood in stool, constipation and diarrhea  Genitourinary: Negative for decreased urine volume, difficulty urinating, frequency and urgency  Musculoskeletal: Negative for arthralgias, back pain and myalgias  Neurological: Negative for dizziness, light-headedness, numbness and headaches  Video Exam    There were no vitals filed for this visit  Physical Exam  Constitutional:       General: She is not in acute distress  Appearance: Normal appearance  She is normal weight  She is not toxic-appearing or diaphoretic  Neurological:      Mental Status: She is alert and oriented to person, place, and time            I spent 20 minutes directly with the patient during this visit

## 2023-03-06 ENCOUNTER — OFFICE VISIT (OUTPATIENT)
Dept: INTERNAL MEDICINE CLINIC | Facility: CLINIC | Age: 63
End: 2023-03-06

## 2023-03-06 VITALS
HEIGHT: 63 IN | TEMPERATURE: 98.3 F | BODY MASS INDEX: 29.77 KG/M2 | SYSTOLIC BLOOD PRESSURE: 152 MMHG | HEART RATE: 78 BPM | DIASTOLIC BLOOD PRESSURE: 83 MMHG | OXYGEN SATURATION: 98 % | WEIGHT: 168 LBS

## 2023-03-06 DIAGNOSIS — R11.10 INTRACTABLE VOMITING: Primary | ICD-10-CM

## 2023-03-06 NOTE — PROGRESS NOTES
Assessment/Plan:    Diagnoses and all orders for this visit:    Intractable vomiting         Symptoms likely due to viral etiology  Symptoms are now improved, feeling little weak and tired  Addressed several questions regarding return to normal diet  Follow-up as needed       There are no Patient Instructions on file for this visit  Subjective:      Patient ID: Barbara Figueroa is a 60 y o  female    Patient comes for follow-up of intractable vomiting, symptoms have since resolved        Current Outpatient Medications:   •  albuterol (PROVENTIL HFA,VENTOLIN HFA) 90 mcg/act inhaler, , Disp: , Rfl:   •  ALPRAZolam (XANAX) 0 25 mg tablet, once as needed, Disp: , Rfl: 2  •  ASMANEX 30 METERED DOSES 110 MCG/INH AEPB inhaler, Inhale 1 puff daily, Disp: , Rfl: 1  •  Brimonidine Tartrate 0 33 % GEL, if needed, Disp: , Rfl:   •  Calcium Ascorbate 500 MG TABS, TAKE 1 TABLET DAILY FOR 50 DAYS, Disp: , Rfl:   •  chlorhexidine (PERIDEX) 0 12 % solution, RINSE 1/2 OUNCE TWICE A DAY FOR 30 SECONDS EXPECTORATE    NO NOT RINSE, Disp: , Rfl: 0  •  Cholecalciferol (VITAMIN D-3) 5000 units TABS, , Disp: , Rfl:   •  diclofenac sodium (VOLTAREN) 1 %, if needed, Disp: , Rfl:   •  EPINEPHrine (EPIPEN) 0 3 mg/0 3 mL SOAJ, , Disp: , Rfl:   •  estradiol (ESTRACE) 0 1 mg/g vaginal cream, As needed, Disp: , Rfl:   •  Fexofenadine HCl (ALLEGRA PO), take 2 tablet (60MG)  by oral route 2 times every day, Disp: , Rfl:   •  fluticasone (FLONASE) 50 mcg/act nasal spray, fluticasone propionate 50 mcg/actuation nasal spray,suspension, Disp: , Rfl:   •  labetalol (NORMODYNE) 100 mg tablet, , Disp: , Rfl:   •  levalbuterol (XOPENEX HFA) 45 mcg/act inhaler, Inhale 2 puffs every 4 (four) hours as needed, Disp: , Rfl:   •  losartan (COZAAR) 50 mg tablet, Take 1 tablet (50 mg total) by mouth daily, Disp: 90 tablet, Rfl: 3  •  mometasone (NASONEX) 50 mcg/act nasal spray, mometasone 50 mcg/actuation nasal spray, Disp: , Rfl:   •  olopatadine (PATANOL) 0 1 % ophthalmic solution, olopatadine 0 1 % eye drops, Disp: , Rfl:   •  ondansetron (ZOFRAN) 4 mg tablet, Take 1 tablet (4 mg total) by mouth every 8 (eight) hours as needed for nausea or vomiting, Disp: 20 tablet, Rfl: 0  •  ORACEA 40 MG capsule, Take 40 mg by mouth daily, Disp: , Rfl: 3  •  pantoprazole (PROTONIX) 40 mg tablet, , Disp: , Rfl:   •  PreviDent 5000 Booster Plus 1 1 % PSTE, , Disp: , Rfl:   •  Restasis 0 05 % ophthalmic emulsion, , Disp: , Rfl:   •  Salicylic Acid 6 % CREA, As needed, Disp: , Rfl:   •  simethicone (MYLICON) 80 mg chewable tablet, Chew every 8 (eight) hours, Disp: , Rfl:   •  SUMAtriptan (IMITREX) 50 mg tablet, Take 1 tablet (50 mg total) by mouth once as needed for migraine for up to 1 dose, Disp: 9 tablet, Rfl: 0  •  Synthroid 100 MCG tablet, , Disp: , Rfl:   •  Synthroid 112 MCG tablet, , Disp: , Rfl:   •  urea (CARMOL) 40 %, if needed, Disp: , Rfl:   •  acetaminophen (TYLENOL) 500 mg tablet, Take 500 mg by mouth every 6 (six) hours as needed, Disp: , Rfl:   •  ascorbic acid (VITAMIN C) 500 mg tablet, Vitamin C 500 mg tablet  TAKE 1 TABLET DAILY FOR 50 DAYS, Disp: , Rfl:   •  Azelastine HCl 0 15 % SOLN, INSERT 2 SPRAYS 2 TIMES DAILY  , Disp: , Rfl: 0  •  levothyroxine 125 mcg tablet, Take 125 mcg by mouth daily 125 mcg & 137 mcg alternate days  , Disp: , Rfl:   •  levothyroxine 137 mcg tablet, , Disp: , Rfl:   •  Lidocaine Viscous HCl (XYLOCAINE) 2 % mucosal solution, USE 5 ML BY MOUTH 4 TIMES DAILY (BEFORE MEALS AND AT BEDTIME) GARGLE FOR THROAT PAIN , Disp: , Rfl: 0  •  loratadine (CLARITIN) 10 mg tablet, TAKE 1T ABLET BY MOUTH DAILY  , Disp: , Rfl: 0     Past Medical History:   Diagnosis Date   • Allergic rhinitis    • Asthma    • Disease of thyroid gland     Hashimoto's   • Fibroid    • Hiatal hernia    • Hypertension    • Hypothyroidism    • Primary osteoarthritis of knee     Left - last assessed: Aug 23, 2017   • Seasonal allergies    • Varicella          Past Surgical History: Procedure Laterality Date   • APPENDECTOMY     • COLONOSCOPY  06/30/2014   • DIAGNOSTIC LAPAROSCOPY     • HAND SURGERY Left    • HYSTERECTOMY  06/28/2021    cervix intact   • MAMMO (HISTORICAL)  11/26/2016   • OOPHORECTOMY Bilateral 06/28/2021   • THYROIDECTOMY  1998    partial         Allergies   Allergen Reactions   • Other Allergic Rhinitis   • Pollen Extract-Tree Extract [Pollen Extract] Allergic Rhinitis       No results found for this or any previous visit (from the past 1008 hour(s))  The following portions of the patient's history were reviewed and updated as appropriate: allergies, current medications, past family history, past medical history, past social history, past surgical history and problem list      Review of Systems   Constitutional: Negative for appetite change, chills, diaphoresis, fatigue, fever and unexpected weight change  Respiratory: Negative for apnea, cough, choking, chest tightness, shortness of breath, wheezing and stridor  Cardiovascular: Negative for chest pain, palpitations and leg swelling  Gastrointestinal: Negative for abdominal distention, abdominal pain, anal bleeding, blood in stool, constipation, diarrhea, nausea and vomiting  Genitourinary: Negative for decreased urine volume, difficulty urinating, frequency and urgency  Musculoskeletal: Negative for arthralgias, back pain and myalgias  Neurological: Negative for dizziness, light-headedness, numbness and headaches  Objective:      Vitals:    03/06/23 1507   BP: 152/83   Pulse: 78   Temp: 98 3 °F (36 8 °C)   SpO2: 98%          Physical Exam  Vitals reviewed  Constitutional:       General: She is not in acute distress  Appearance: Normal appearance  She is not ill-appearing, toxic-appearing or diaphoretic  HENT:      Mouth/Throat:      Mouth: Mucous membranes are moist    Cardiovascular:      Rate and Rhythm: Normal rate and regular rhythm  Pulses: Normal pulses        Heart sounds: Normal heart sounds  No murmur heard  No friction rub  No gallop  Pulmonary:      Effort: Pulmonary effort is normal  No respiratory distress  Breath sounds: Normal breath sounds  No stridor  No wheezing, rhonchi or rales  Chest:      Chest wall: No tenderness  Musculoskeletal:      Right lower leg: No edema  Left lower leg: No edema  Skin:     General: Skin is warm and dry  Findings: No lesion or rash  Neurological:      Mental Status: She is alert

## 2023-05-23 ENCOUNTER — APPOINTMENT (OUTPATIENT)
Dept: LAB | Facility: AMBULARY SURGERY CENTER | Age: 63
End: 2023-05-23

## 2023-05-23 DIAGNOSIS — E78.5 HYPERLIPOPROTEINEMIA: ICD-10-CM

## 2023-05-23 DIAGNOSIS — E06.3 CHRONIC LYMPHOCYTIC THYROIDITIS: ICD-10-CM

## 2023-05-23 DIAGNOSIS — I51.9 MYXEDEMA HEART DISEASE: ICD-10-CM

## 2023-05-23 DIAGNOSIS — E03.9 MYXEDEMA HEART DISEASE: ICD-10-CM

## 2023-05-23 DIAGNOSIS — E66.9 LIFELONG OBESITY: ICD-10-CM

## 2023-05-23 DIAGNOSIS — I10 ESSENTIAL HYPERTENSION, MALIGNANT: ICD-10-CM

## 2023-05-23 LAB
ALBUMIN SERPL BCP-MCNC: 3.3 G/DL (ref 3.5–5)
ALP SERPL-CCNC: 99 U/L (ref 46–116)
ALT SERPL W P-5'-P-CCNC: 20 U/L (ref 12–78)
ANION GAP SERPL CALCULATED.3IONS-SCNC: 0 MMOL/L (ref 4–13)
AST SERPL W P-5'-P-CCNC: 17 U/L (ref 5–45)
BASOPHILS # BLD AUTO: 0.05 THOUSANDS/ÂΜL (ref 0–0.1)
BASOPHILS NFR BLD AUTO: 1 % (ref 0–1)
BILIRUB SERPL-MCNC: 0.34 MG/DL (ref 0.2–1)
BUN SERPL-MCNC: 25 MG/DL (ref 5–25)
CALCIUM ALBUM COR SERPL-MCNC: 9.2 MG/DL (ref 8.3–10.1)
CALCIUM SERPL-MCNC: 8.6 MG/DL (ref 8.3–10.1)
CHLORIDE SERPL-SCNC: 108 MMOL/L (ref 96–108)
CHOLEST SERPL-MCNC: 175 MG/DL
CO2 SERPL-SCNC: 27 MMOL/L (ref 21–32)
CREAT SERPL-MCNC: 0.69 MG/DL (ref 0.6–1.3)
EOSINOPHIL # BLD AUTO: 0.12 THOUSAND/ÂΜL (ref 0–0.61)
EOSINOPHIL NFR BLD AUTO: 3 % (ref 0–6)
ERYTHROCYTE [DISTWIDTH] IN BLOOD BY AUTOMATED COUNT: 13.1 % (ref 11.6–15.1)
EST. AVERAGE GLUCOSE BLD GHB EST-MCNC: 108 MG/DL
GFR SERPL CREATININE-BSD FRML MDRD: 92 ML/MIN/1.73SQ M
GLUCOSE P FAST SERPL-MCNC: 91 MG/DL (ref 65–99)
HBA1C MFR BLD: 5.4 %
HCT VFR BLD AUTO: 39.6 % (ref 34.8–46.1)
HDLC SERPL-MCNC: 65 MG/DL
HGB BLD-MCNC: 12.7 G/DL (ref 11.5–15.4)
IMM GRANULOCYTES # BLD AUTO: 0 THOUSAND/UL (ref 0–0.2)
IMM GRANULOCYTES NFR BLD AUTO: 0 % (ref 0–2)
LDLC SERPL CALC-MCNC: 97 MG/DL (ref 0–100)
LYMPHOCYTES # BLD AUTO: 1.58 THOUSANDS/ÂΜL (ref 0.6–4.47)
LYMPHOCYTES NFR BLD AUTO: 43 % (ref 14–44)
MAGNESIUM SERPL-MCNC: 2.3 MG/DL (ref 1.6–2.6)
MCH RBC QN AUTO: 29.2 PG (ref 26.8–34.3)
MCHC RBC AUTO-ENTMCNC: 32.1 G/DL (ref 31.4–37.4)
MCV RBC AUTO: 91 FL (ref 82–98)
MONOCYTES # BLD AUTO: 0.46 THOUSAND/ÂΜL (ref 0.17–1.22)
MONOCYTES NFR BLD AUTO: 13 % (ref 4–12)
NEUTROPHILS # BLD AUTO: 1.45 THOUSANDS/ÂΜL (ref 1.85–7.62)
NEUTS SEG NFR BLD AUTO: 40 % (ref 43–75)
NONHDLC SERPL-MCNC: 110 MG/DL
NRBC BLD AUTO-RTO: 0 /100 WBCS
PHOSPHATE SERPL-MCNC: 4.2 MG/DL (ref 2.3–4.1)
PLATELET # BLD AUTO: 266 THOUSANDS/UL (ref 149–390)
PMV BLD AUTO: 11.1 FL (ref 8.9–12.7)
POTASSIUM SERPL-SCNC: 3.9 MMOL/L (ref 3.5–5.3)
PROT SERPL-MCNC: 6.6 G/DL (ref 6.4–8.4)
RBC # BLD AUTO: 4.35 MILLION/UL (ref 3.81–5.12)
SODIUM SERPL-SCNC: 135 MMOL/L (ref 135–147)
T4 FREE SERPL-MCNC: 0.93 NG/DL (ref 0.61–1.12)
TRIGL SERPL-MCNC: 64 MG/DL
TSH SERPL DL<=0.05 MIU/L-ACNC: 0.89 UIU/ML (ref 0.45–4.5)
WBC # BLD AUTO: 3.66 THOUSAND/UL (ref 4.31–10.16)

## 2023-05-31 LAB
ALDOST SERPL-MCNC: 7.4 NG/DL (ref 0–30)
ALDOST/RENIN PLAS-RTO: 16.6 {RATIO} (ref 0–30)
RENIN PLAS-CCNC: 0.45 NG/ML/HR (ref 0.17–5.38)

## 2023-06-09 ENCOUNTER — RA CDI HCC (OUTPATIENT)
Dept: OTHER | Facility: HOSPITAL | Age: 63
End: 2023-06-09

## 2023-06-09 NOTE — PROGRESS NOTES
[facility]  coding opportunities       Chart reviewed, no opportunity found: CHART REVIEWED, NO OPPORTUNITY FOUND        Patients Insurance        Commercial Insurance: [address] [place] [address]

## 2023-06-13 ENCOUNTER — OFFICE VISIT (OUTPATIENT)
Dept: INTERNAL MEDICINE CLINIC | Facility: CLINIC | Age: 63
End: 2023-06-13
Payer: COMMERCIAL

## 2023-06-13 ENCOUNTER — TELEPHONE (OUTPATIENT)
Dept: INTERNAL MEDICINE CLINIC | Facility: CLINIC | Age: 63
End: 2023-06-13

## 2023-06-13 VITALS
HEART RATE: 104 BPM | BODY MASS INDEX: 30.48 KG/M2 | HEIGHT: 63 IN | TEMPERATURE: 99.9 F | OXYGEN SATURATION: 98 % | SYSTOLIC BLOOD PRESSURE: 138 MMHG | WEIGHT: 172 LBS | DIASTOLIC BLOOD PRESSURE: 90 MMHG

## 2023-06-13 DIAGNOSIS — U07.1 COVID-19: Primary | ICD-10-CM

## 2023-06-13 DIAGNOSIS — J02.0 STREP THROAT: ICD-10-CM

## 2023-06-13 DIAGNOSIS — J02.9 SORE THROAT: ICD-10-CM

## 2023-06-13 LAB
S PYO AG THROAT QL: POSITIVE
SARS-COV-2 AG UPPER RESP QL IA: POSITIVE
VALID CONTROL: ABNORMAL

## 2023-06-13 PROCEDURE — 87880 STREP A ASSAY W/OPTIC: CPT | Performed by: INTERNAL MEDICINE

## 2023-06-13 PROCEDURE — 87811 SARS-COV-2 COVID19 W/OPTIC: CPT | Performed by: INTERNAL MEDICINE

## 2023-06-13 PROCEDURE — 99213 OFFICE O/P EST LOW 20 MIN: CPT | Performed by: INTERNAL MEDICINE

## 2023-06-13 RX ORDER — AZITHROMYCIN 250 MG/1
TABLET, FILM COATED ORAL
Qty: 6 TABLET | Refills: 0 | Status: SHIPPED | OUTPATIENT
Start: 2023-06-13 | End: 2023-06-18

## 2023-06-13 RX ORDER — NIRMATRELVIR AND RITONAVIR 300-100 MG
3 KIT ORAL 2 TIMES DAILY
Qty: 30 TABLET | Refills: 0 | Status: SHIPPED | OUTPATIENT
Start: 2023-06-13 | End: 2023-06-18

## 2023-06-13 NOTE — TELEPHONE ENCOUNTER
Z-Tenzin needs to be taken once daily    Paxlovid comes in a pack and instructions will be on the reverse

## 2023-06-13 NOTE — TELEPHONE ENCOUNTER
patient called and would like you to call her she got the medications from pharmacy but is not sure how to take them the directions are unclear

## 2023-06-13 NOTE — PROGRESS NOTES
Assessment/Plan:    There are no diagnoses linked to this encounter  There are no Patient Instructions on file for this visit  Subjective:      Patient ID: Steve Miller is a 60 y o  female    HPI      Current Outpatient Medications:   •  albuterol (PROVENTIL HFA,VENTOLIN HFA) 90 mcg/act inhaler, , Disp: , Rfl:   •  ASMANEX 30 METERED DOSES 110 MCG/INH AEPB inhaler, Inhale 1 puff daily, Disp: , Rfl: 1  •  Cholecalciferol (VITAMIN D-3) 5000 units TABS, , Disp: , Rfl:   •  EPINEPHrine (EPIPEN) 0 3 mg/0 3 mL SOAJ, , Disp: , Rfl:   •  Fexofenadine HCl (ALLEGRA PO), take 2 tablet (60MG)  by oral route 2 times every day, Disp: , Rfl:   •  fluticasone (FLONASE) 50 mcg/act nasal spray, fluticasone propionate 50 mcg/actuation nasal spray,suspension, Disp: , Rfl:   •  levalbuterol (XOPENEX HFA) 45 mcg/act inhaler, Inhale 2 puffs every 4 (four) hours as needed, Disp: , Rfl:   •  levothyroxine 125 mcg tablet, Take 125 mcg by mouth daily 125 mcg & 137 mcg alternate days  , Disp: , Rfl:   •  levothyroxine 137 mcg tablet, , Disp: , Rfl:   •  losartan (COZAAR) 50 mg tablet, Take 1 tablet (50 mg total) by mouth daily, Disp: 90 tablet, Rfl: 3  •  mometasone (NASONEX) 50 mcg/act nasal spray, mometasone 50 mcg/actuation nasal spray, Disp: , Rfl:   •  ORACEA 40 MG capsule, Take 40 mg by mouth daily, Disp: , Rfl: 3  •  pantoprazole (PROTONIX) 40 mg tablet, , Disp: , Rfl:   •  PreviDent 5000 Booster Plus 1 1 % PSTE, , Disp: , Rfl:   •  Restasis 0 05 % ophthalmic emulsion, , Disp: , Rfl:   •  Salicylic Acid 6 % CREA, As needed, Disp: , Rfl:   •  SUMAtriptan (IMITREX) 50 mg tablet, Take 1 tablet (50 mg total) by mouth once as needed for migraine for up to 1 dose, Disp: 9 tablet, Rfl: 0  •  Synthroid 100 MCG tablet, , Disp: , Rfl:   •  Synthroid 112 MCG tablet, , Disp: , Rfl:   •  acetaminophen (TYLENOL) 500 mg tablet, Take 500 mg by mouth every 6 (six) hours as needed, Disp: , Rfl:   •  ALPRAZolam (XANAX) 0 25 mg tablet, once as needed, Disp: , Rfl: 2  •  ascorbic acid (VITAMIN C) 500 mg tablet, Vitamin C 500 mg tablet  TAKE 1 TABLET DAILY FOR 50 DAYS, Disp: , Rfl:   •  Azelastine HCl 0 15 % SOLN, INSERT 2 SPRAYS 2 TIMES DAILY  , Disp: , Rfl: 0  •  Brimonidine Tartrate 0 33 % GEL, if needed, Disp: , Rfl:   •  Calcium Ascorbate 500 MG TABS, TAKE 1 TABLET DAILY FOR 50 DAYS, Disp: , Rfl:   •  chlorhexidine (PERIDEX) 0 12 % solution, RINSE 1/2 OUNCE TWICE A DAY FOR 30 SECONDS EXPECTORATE   NO NOT RINSE, Disp: , Rfl: 0  •  diclofenac sodium (VOLTAREN) 1 %, if needed, Disp: , Rfl:   •  estradiol (ESTRACE) 0 1 mg/g vaginal cream, As needed, Disp: , Rfl:   •  labetalol (NORMODYNE) 100 mg tablet, , Disp: , Rfl:   •  Lidocaine Viscous HCl (XYLOCAINE) 2 % mucosal solution, USE 5 ML BY MOUTH 4 TIMES DAILY (BEFORE MEALS AND AT BEDTIME) GARGLE FOR THROAT PAIN , Disp: , Rfl: 0  •  loratadine (CLARITIN) 10 mg tablet, TAKE 1T ABLET BY MOUTH DAILY  , Disp: , Rfl: 0  •  olopatadine (PATANOL) 0 1 % ophthalmic solution, olopatadine 0 1 % eye drops, Disp: , Rfl:   •  ondansetron (ZOFRAN) 4 mg tablet, Take 1 tablet (4 mg total) by mouth every 8 (eight) hours as needed for nausea or vomiting, Disp: 20 tablet, Rfl: 0  •  simethicone (MYLICON) 80 mg chewable tablet, Chew every 8 (eight) hours, Disp: , Rfl:   •  urea (CARMOL) 40 %, if needed, Disp: , Rfl:      Past Medical History:   Diagnosis Date   • Allergic rhinitis    • Asthma    • Disease of thyroid gland     Hashimoto's   • Fibroid    • Hiatal hernia    • Hypertension    • Hypothyroidism    • Primary osteoarthritis of knee     Left - last assessed: Aug 23, 2017   • Seasonal allergies    • Varicella          Past Surgical History:   Procedure Laterality Date   • APPENDECTOMY     • COLONOSCOPY  06/30/2014   • DIAGNOSTIC LAPAROSCOPY     • HAND SURGERY Left    • HYSTERECTOMY  06/28/2021    cervix intact   • MAMMO (HISTORICAL)  11/26/2016   • OOPHORECTOMY Bilateral 06/28/2021   • THYROIDECTOMY  1998    partial Allergies   Allergen Reactions   • Other Allergic Rhinitis   • Pollen Extract-Tree Extract [Pollen Extract] Allergic Rhinitis       Recent Results (from the past 1008 hour(s))   Urinalysis with microscopic    Collection Time: 05/23/23  7:11 AM   Result Value Ref Range    Color, UA Colorless     Clarity, UA Clear     Specific [place], UA 1 010 1 003 - 1 030    pH, UA 6 5 4 5, 5 0, 5 5, 6 0, 6 5, 7 0, 7 5, 8 0    Leukocytes, UA Negative Negative    Nitrite, UA Negative Negative    Protein, UA Negative Negative mg/dl    Glucose, UA Negative Negative mg/dl    Ketones, UA Negative Negative mg/dl    Urobilinogen, UA <2 0 <2 0 mg/dl mg/dl    Bilirubin, UA Negative Negative    Occult Blood, UA Negative Negative    RBC, UA None Seen None Seen, 1-2 /hpf    WBC, UA None Seen None Seen, 1-2 /hpf    Epithelial Cells Occasional None Seen, Occasional /hpf    Bacteria, UA None Seen None Seen, Occasional /hpf   Comprehensive metabolic panel    Collection Time: 05/23/23  7:11 AM   Result Value Ref Range    Sodium 135 135 - 147 mmol/L    Potassium 3 9 3 5 - 5 3 mmol/L    Chloride 108 96 - 108 mmol/L    CO2 27 21 - 32 mmol/L    ANION GAP 0 (L) 4 - 13 mmol/L    BUN 25 5 - 25 mg/dL    Creatinine 0 69 0 60 - 1 30 mg/dL    Glucose, Fasting 91 65 - 99 mg/dL    Calcium 8 6 8 3 - 10 1 mg/dL    Corrected Calcium 9 2 8 3 - 10 1 mg/dL    AST 17 5 - 45 U/L    ALT 20 12 - 78 U/L    Alkaline Phosphatase 99 46 - 116 U/L    Total Protein 6 6 6 4 - 8 4 g/dL    Albumin 3 3 (L) 3 5 - 5 0 g/dL    Total Bilirubin 0 34 0 20 - 1 00 mg/dL    eGFR 92 ml/min/1 73sq m   CBC and differential    Collection Time: 05/23/23  7:11 AM   Result Value Ref Range    WBC 3 66 (L) 4 31 - 10 16 Thousand/uL    RBC 4 35 3 81 - 5 12 Million/uL    Hemoglobin 12 7 11 5 - 15 4 g/dL    Hematocrit 39 6 34 8 - 46 1 %    MCV 91 82 - 98 fL    MCH 29 2 26 8 - 34 3 pg    MCHC 32 1 31 4 - 37 4 g/dL    RDW 13 1 11 6 - 15 1 %    MPV 11 1 8 9 - 12 7 fL    Platelets 951 843 - 838 Thousands/uL    nRBC 0 /100 WBCs    Neutrophils Relative 40 (L) 43 - 75 %    Immat GRANS % 0 0 - 2 %    Lymphocytes Relative 43 14 - 44 %    Monocytes Relative 13 (H) 4 - 12 %    Eosinophils Relative 3 0 - 6 %    Basophils Relative 1 0 - 1 %    Neutrophils Absolute 1 45 (L) 1 85 - 7 62 Thousands/µL    Immature Grans Absolute 0 00 0 00 - 0 20 Thousand/uL    Lymphocytes Absolute 1 58 0 60 - 4 47 Thousands/µL    Monocytes Absolute 0 46 0 17 - 1 22 Thousand/µL    Eosinophils Absolute 0 12 0 00 - 0 61 Thousand/µL    Basophils Absolute 0 05 0 00 - 0 10 Thousands/µL   TSH, 3rd generation    Collection Time: 05/23/23  7:11 AM   Result Value Ref Range    TSH 3RD GENERATON 0 886 0 450 - 4 500 uIU/mL   T4, free    Collection Time: 05/23/23  7:11 AM   Result Value Ref Range    Free T4 0 93 0 61 - 1 12 ng/dL   Hemoglobin A1C    Collection Time: 05/23/23  7:11 AM   Result Value Ref Range    Hemoglobin A1C 5 4 Normal 3 8-5 6%; PreDiabetic 5 7-6 4%;  Diabetic >=6 5%; Glycemic control for adults with diabetes <7 0% %     mg/dl   Magnesium    Collection Time: 05/23/23  7:11 AM   Result Value Ref Range    Magnesium 2 3 1 6 - 2 6 mg/dL   Phosphorus    Collection Time: 05/23/23  7:11 AM   Result Value Ref Range    Phosphorus 4 2 (H) 2 3 - 4 1 mg/dL   Lipid panel    Collection Time: 05/23/23  7:11 AM   Result Value Ref Range    Cholesterol 175 See Comment mg/dL    Triglycerides 64 See Comment mg/dL    HDL, Direct 65 >=50 mg/dL    LDL Calculated 97 0 - 100 mg/dL    Non-HDL-Chol (CHOL-HDL) 110 mg/dl   Aldosterone/Renin Ratio    Collection Time: 05/23/23  7:11 AM   Result Value Ref Range    Renin 0 447 0 167 - 5 380 ng/mL/hr    Aldosterone 7 4 0 0 - 30 0 ng/dL    Aldos/Renin Ratio 16 6 0 0 - 30 0       The following portions of the patient's history were reviewed and updated as appropriate: allergies, current medications, past family history, past medical history, past social history, past surgical history and problem list      Review of Systems   Constitutional: Negative for activity change, appetite change, chills, diaphoresis, fatigue, fever and unexpected weight change  HENT: Positive for sore throat  Negative for congestion, drooling, ear discharge, ear pain, facial swelling, hearing loss, postnasal drip, rhinorrhea, sinus pressure, sinus pain, sneezing, tinnitus, trouble swallowing and voice change  Eyes: Negative for discharge  Respiratory: Negative for apnea, cough, choking, chest tightness, shortness of breath, wheezing and stridor  Cardiovascular: Negative for chest pain, palpitations and leg swelling  Gastrointestinal: Negative for abdominal distention, abdominal pain, anal bleeding, blood in stool, constipation, diarrhea, nausea and vomiting  Genitourinary: Negative for decreased urine volume, difficulty urinating, frequency and urgency  Musculoskeletal: Negative for arthralgias, back pain and myalgias  Skin: Negative for color change  Neurological: Negative for dizziness, light-headedness, numbness and headaches  Objective:      Vitals:    06/13/23 1132   BP: 138/90   Pulse: 104   Temp: 99 9 °F (37 7 °C)   SpO2: 98%          Physical Exam  Vitals reviewed  Constitutional:       General: She is not in acute distress  Appearance: Normal appearance  She is not ill-appearing, toxic-appearing or diaphoretic  HENT:      Mouth/Throat:      Mouth: Mucous membranes are moist    Cardiovascular:      Rate and Rhythm: Normal rate and regular rhythm  Pulses: Normal pulses  Heart sounds: Normal heart sounds  No murmur heard  No friction rub  No gallop  Pulmonary:      Effort: Pulmonary effort is normal  No respiratory distress  Breath sounds: Normal breath sounds  No stridor  No wheezing, rhonchi or rales  Chest:      Chest wall: No tenderness  Musculoskeletal:      Right lower leg: No edema  Left lower leg: No edema  Skin:     General: Skin is warm and dry        Findings: No lesion or rash  Neurological:      Mental Status: She is alert

## 2023-06-13 NOTE — PROGRESS NOTES
COVID-19 Outpatient Progress Note    Assessment/Plan:    Problem List Items Addressed This Visit    None  Visit Diagnoses     COVID-19    -  Primary    Strep throat        Relevant Medications    azithromycin (Zithromax) 250 mg tablet    Other Relevant Orders    POCT rapid strepA (Completed)    Sore throat        Relevant Orders    POCT Rapid Covid Ag (Completed)         Disposition:     Rapid antigen testing was performed and the result is POSITIVE for COVID-19  Patient has asymptomatic or mild COVID-19 infection  Based off CDC guidelines, they were recommended to isolate for 5 days  If they are asymptomatic or symptoms are improving with no fevers in the past 24 hours, isolation may be ended followed by 5 days of wearing a mask when around othes to minimize risk of infecting others  If still have a fever or other symptoms have not improved, continue to isolate until they improve  Regardless of when they end isolation, avoid being around people who are more likely to get very sick from COVID-19 until at least day 11  Discussed symptom directed medication options with patient  Discussed vitamin D, vitamin C, and/or zinc supplementation with patient  Patient meets criteria for PAXLOVID and they have been counseled appropriately according to EUA documentation released by the FDA  After discussion, patient agrees to treatment  Aiden Flores is an investigational medicine used to treat mild-to-moderate COVID-19 in adults and children (15years of age and older weighing at least 80 pounds (40 kg)) with positive results of direct SARS-CoV-2 viral testing, and who are at high risk for progression to severe COVID-19, including hospitalization or death  PAXLOVID is investigational because it is still being studied  There is limited information about the safety and effectiveness of using PAXLOVID to treat people with mild-to-moderate COVID-19      The FDA has authorized the emergency use of PAXLOVID for the treatment of mild-tomoderate COVID-19 in adults and children (15years of age and older weighing at least 80 pounds (40 kg)) with a positive test for the virus that causes COVID-19, and who are at high risk for progression to severe COVID-19, including hospitalization or death, under an EUA  What should I tell my healthcare provider before I take PAXLOVID? Tell your healthcare provider if you:  - Have any allergies  - Have liver or kidney disease  - Are pregnant or plan to become pregnant  - Are breastfeeding a child  - Have any serious illnesses    Tell your healthcare provider about all the medicines you take, including prescription and over-the-counter medicines, vitamins, and herbal supplements  Some medicines may interact with PAXLOVID and may cause serious side effects  Keep a list of your medicines to show your healthcare provider and pharmacist when you get a new medicine  You can ask your healthcare provider or pharmacist for a list of medicines that interact with PAXLOVID  Do not start taking a new medicine without telling your healthcare provider  Your healthcare provider can tell you if it is safe to take PAXLOVID with other medicines  Tell your healthcare provider if you are taking combined hormonal contraceptive  PAXLOVID may affect how your birth control pills work  Females who are able to become pregnant should use another effective alternative form of contraception or an additional barrier method of contraception  Talk to your healthcare provider if you have any questions about contraceptive methods that might be right for you  How do I take PAXLOVID? PAXLOVID consists of 2 medicines: nirmatrelvir and ritonavir  - Take 2 pink tablets of nirmatrelvir with 1 white tablet of ritonavir by mouth 2 times each day (in the morning and in the evening) for 5 days  For each dose, take all 3 tablets at the same time  - If you have kidney disease, talk to your healthcare provider   You may need a different dose  - Swallow the tablets whole  Do not chew, break, or crush the tablets  - Take PAXLOVID with or without food  - Do not stop taking PAXLOVID without talking to your healthcare provider, even if you feel better  - If you miss a dose of PAXLOVID within 8 hours of the time it is usually taken, take it as soon as you remember  If you miss a dose by more than 8 hours, skip the missed dose and take the next dose at your regular time  Do not take 2 doses of PAXLOVID at the same time  - If you take too much PAXLOVID, call your healthcare provider or go to the nearest [facility] emergency room right away  - If you are taking a ritonavir- or cobicistat-containing medicine to treat hepatitis C or Human Immunodeficiency Virus (HIV), you should continue to take your medicine as prescribed by your healthcare provider   - Talk to your healthcare provider if you do not feel better or if you feel worse after 5 days  Who should generally not take PAXLOVID? Do not take PAXLOVID if:  You are allergic to nirmatrelvir, ritonavir, or any of the ingredients in PAXLOVID  You are taking any of the following medicines:  - Alfuzosin  - Pethidine, piroxicam, propoxyphene  - Ranolazine  - Amiodarone, dronedarone, flecainide, propafenone, quinidine  - Colchicine  - Lurasidone, pimozide, clozapine  - Dihydroergotamine, ergotamine, methylergonovine  - Lovastatin, simvastatin  - Sildenafil (Revatio®) for pulmonary arterial hypertension (PAH)  - Triazolam, oral midazolam  - Apalutamide  - Carbamazepine, phenobarbital, phenytoin  - Rifampin  - St  Pedro’s Wort (hypericum perforatum)    What are the important possible side effects of PAXLOVID? Possible side effects of PAXLOVID are:  - Liver Problems   Tell your healthcare provider right away if you have any of these signs and symptoms of liver problems: loss of appetite, yellowing of your skin and the whites of eyes (jaundice), dark-colored urine, pale colored stools and itchy skin, stomach area (abdominal) pain  - Resistance to HIV Medicines  If you have untreated HIV infection, PAXLOVID may lead to some HIV medicines not working as well in the future  - Other possible side effects include: altered sense of taste, diarrhea, high blood pressure, or muscle aches    These are not all the possible side effects of PAXLOVID  Not many people have taken PAXLOVID  Serious and unexpected side effects may happen  [facility] is still being studied, so it is possible that all of the risks are not known at this time  What other treatment choices are there? Like Fortunato Sorensen may allow for the emergency use of other medicines to treat people with COVID-19  Go to https://Visual[employer]/ for information on the emergency use of other medicines that are authorized by FDA to treat people with COVID-19  Your healthcare provider may talk with you about clinical trials for which you may be eligible  It is your choice to be treated or not to be treated with PAXLOVID  Should you decide not to receive it or for your child not to receive it, it will not change your standard medical care  What if I am pregnant or breastfeeding? There is no experience treating pregnant women or breastfeeding mothers with PAXLOVID  For a mother and unborn baby, the benefit of taking PAXLOVID may be greater than the risk from the treatment  If you are pregnant, discuss your options and specific situation with your healthcare provider  It is recommended that you use effective barrier contraception or do not have sexual activity while taking PAXLOVID  If you are breastfeeding, discuss your options and specific situation with your healthcare provider  How do I report side effects with PAXLOVID? Contact your healthcare provider if you have any side effects that bother you or do not go away      Report side effects to FDA MedWatch at www fda gov/medwatch or call 2-695-XYB7689 or you can report side effects to [facility]  at the contact information provided below  Website Fax number Telephone number   [employer] [phone]     How should I store [address]? Store PAXLOVID tablets at room temperature between 68°F to 77°F (20°C to 25°C)  Full fact sheet for patients, parents, and caregivers can be found at: [email or web address]    I have spent a total time of 20 minutes on the day of the encounter for this patient including discussing diagnostic results, risks and benefits of treatment options, instructions for management, patient and family education, importance of treatment compliance, risk factor reductions, impressions and counseling/coordination of care  Encounter provider: Sreekanth Bright MD     Provider located at: [address] [place] [address] Rd  [address] [facility] [address]  [address] [facility] [id number] [phone]     Recent Visits  No visits were found meeting these conditions  Showing recent visits within past 7 days and meeting all other requirements  Today's Visits  Date Type Provider Dept   06/13/23 Office Visit Sreekanth Bright MD [facility]  [facility]   Showing today's visits and meeting all other requirements  Future Appointments  No visits were found meeting these conditions  Showing future appointments within next 150 days and meeting all other requirements     Subjective:   Netta Krueger is a 60 y o  female who is concerned about COVID-19  Patient's symptoms include fever and sore throat  Patient denies chills, fatigue, congestion, rhinorrhea, cough, shortness of breath, chest tightness, abdominal pain, nausea, vomiting, diarrhea, myalgias and headaches       - Date of symptom onset: 6/10/2023      COVID-19 vaccination status: Fully vaccinated with booster    Exposure:   Contact with a person who is under investigation (PUI) for or who is positive for COVID-19 within the last 14 days?: No    Hospitalized recently for fever and/or lower respiratory symptoms?: No      Currently a healthcare worker that is involved in direct patient care?: No      Works in a special setting where the risk of COVID-19 transmission may be high? (this may include long-term care, correctional and [facility] facilities; homeless shelters; assisted-living facilities and group homes ): No      Resident in a special setting where the risk of COVID-19 transmission may be high? (this may include long-term care, correctional and [facility] facilities; homeless shelters; assisted-living facilities and group homes ): No      Lab Results   Component Value Date    [facility] Not Detected 06/23/2021    SARSCOV2 Negative 12/22/2022    SARSCOVAG Positive (A) 06/13/2023       Review of Systems   Constitutional: Positive for fever  Negative for activity change, appetite change, chills, diaphoresis, fatigue and unexpected weight change  HENT: Positive for sore throat  Negative for congestion, drooling, ear discharge, ear pain, facial swelling, hearing loss, postnasal drip, rhinorrhea, sinus pressure, sinus pain, sneezing, tinnitus, trouble swallowing and voice change  Eyes: Negative for discharge  Respiratory: Negative for apnea, cough, choking, chest tightness, shortness of breath, wheezing and stridor  Cardiovascular: Negative for chest pain, palpitations and leg swelling  Gastrointestinal: Negative for abdominal distention, abdominal pain, anal bleeding, blood in stool, constipation, diarrhea, nausea and vomiting  Genitourinary: Negative for decreased urine volume, difficulty urinating, frequency and urgency  Musculoskeletal: Negative for arthralgias, back pain and myalgias  Skin: Negative for color change  Neurological: Negative for dizziness, light-headedness, numbness and headaches       Current Outpatient Medications on File Prior to Visit   Medication Sig   • albuterol (PROVENTIL HFA,VENTOLIN HFA) 90 mcg/act inhaler    • ASMANEX 30 METERED DOSES 110 MCG/INH AEPB inhaler Inhale 1 puff daily   • Cholecalciferol (VITAMIN D-3) 5000 units TABS    • EPINEPHrine (EPIPEN) 0 3 mg/0 3 mL SOAJ    • Fexofenadine HCl (ALLEGRA PO) take 2 tablet (60MG)  by oral route 2 times every day   • fluticasone (FLONASE) 50 mcg/act nasal spray fluticasone propionate 50 mcg/actuation nasal spray,suspension   • levalbuterol (XOPENEX HFA) 45 mcg/act inhaler Inhale 2 puffs every 4 (four) hours as needed   • levothyroxine 125 mcg tablet Take 125 mcg by mouth daily 125 mcg & 137 mcg alternate days  • levothyroxine 137 mcg tablet    • losartan (COZAAR) 50 mg tablet Take 1 tablet (50 mg total) by mouth daily   • mometasone (NASONEX) 50 mcg/act nasal spray mometasone 50 mcg/actuation nasal spray   • ORACEA 40 MG capsule Take 40 mg by mouth daily   • pantoprazole (PROTONIX) 40 mg tablet    • PreviDent 5000 Booster Plus 1 1 % PSTE    • Restasis 0 05 % ophthalmic emulsion    • Salicylic Acid 6 % CREA As needed   • SUMAtriptan (IMITREX) 50 mg tablet Take 1 tablet (50 mg total) by mouth once as needed for migraine for up to 1 dose   • Synthroid 100 MCG tablet    • Synthroid 112 MCG tablet    • acetaminophen (TYLENOL) 500 mg tablet Take 500 mg by mouth every 6 (six) hours as needed   • ALPRAZolam (XANAX) 0 25 mg tablet once as needed   • ascorbic acid (VITAMIN C) 500 mg tablet Vitamin C 500 mg tablet   TAKE 1 TABLET DAILY FOR 50 DAYS   • Azelastine HCl 0 15 % SOLN INSERT 2 SPRAYS 2 TIMES DAILY  • Brimonidine Tartrate 0 33 % GEL if needed   • Calcium Ascorbate 500 MG TABS TAKE 1 TABLET DAILY FOR 50 DAYS   • chlorhexidine (PERIDEX) 0 12 % solution RINSE 1/2 OUNCE TWICE A DAY FOR 30 SECONDS EXPECTORATE    NO NOT RINSE   • diclofenac sodium (VOLTAREN) 1 % if needed   • estradiol (ESTRACE) 0 1 mg/g vaginal cream As needed   • labetalol (NORMODYNE) 100 mg tablet    • "Lidocaine Viscous HCl (XYLOCAINE) 2 % mucosal solution USE 5 ML BY MOUTH 4 TIMES DAILY (BEFORE MEALS AND AT BEDTIME) GARGLE FOR THROAT PAIN  • loratadine (CLARITIN) 10 mg tablet TAKE 1T ABLET BY MOUTH DAILY  • olopatadine (PATANOL) 0 1 % ophthalmic solution olopatadine 0 1 % eye drops   • ondansetron (ZOFRAN) 4 mg tablet Take 1 tablet (4 mg total) by mouth every 8 (eight) hours as needed for nausea or vomiting   • simethicone (MYLICON) 80 mg chewable tablet Chew every 8 (eight) hours   • urea (CARMOL) 40 % if needed       Objective:    /90 (BP Location: Left arm, Patient Position: Sitting, Cuff Size: Standard)   Pulse 104   Temp 99 9 °F (37 7 °C) (Temporal)   Ht 5' 3\" (1 6 m)   Wt 78 kg (172 lb)   LMP  (LMP Unknown)   SpO2 98%   BMI 30 47 kg/m²        Physical Exam  Constitutional:       General: She is not in acute distress  Appearance: Normal appearance  She is normal weight  She is not ill-appearing, toxic-appearing or diaphoretic  HENT:      Mouth/Throat:      Pharynx: Posterior oropharyngeal erythema present  No oropharyngeal exudate  Cardiovascular:      Rate and Rhythm: Normal rate and regular rhythm  Pulses: Normal pulses  Heart sounds: Normal heart sounds  No murmur heard  No gallop  Pulmonary:      Effort: Pulmonary effort is normal  No respiratory distress  Breath sounds: Normal breath sounds  No stridor  No wheezing, rhonchi or rales  Chest:      Chest wall: No tenderness  Abdominal:      General: Abdomen is flat  Bowel sounds are normal  There is no distension  Palpations: Abdomen is soft  There is no mass  Tenderness: There is no abdominal tenderness  There is no guarding  Hernia: No hernia is present  Musculoskeletal:         General: Normal range of motion  Skin:     General: Skin is warm and dry  Capillary Refill: Capillary refill takes less than 2 seconds  Neurological:      General: No focal deficit present        Mental " Status: She is alert         Wilber Vega MD

## 2023-06-15 ENCOUNTER — TELEPHONE (OUTPATIENT)
Dept: INTERNAL MEDICINE CLINIC | Facility: CLINIC | Age: 63
End: 2023-06-15

## 2023-06-15 NOTE — TELEPHONE ENCOUNTER
Patient was seen on Tuesday  She had tested positive Covid and strep  She was ordered Paxlovid and Zpak  She does not feel any symptoms of covid - She is not having a cough; congestion; no fever  She only had a sore throat and feels that the zpack is taking care of that  Since starting the medication, she has been experiencing diarrhea and a metallic taste in her mouth  She is wondering if she is able to stop the Paxlovid and just continue with the zpak       Please advise     CB# [phone]

## 2023-06-26 ENCOUNTER — OFFICE VISIT (OUTPATIENT)
Dept: INTERNAL MEDICINE CLINIC | Facility: CLINIC | Age: 63
End: 2023-06-26
Payer: COMMERCIAL

## 2023-06-26 VITALS
HEART RATE: 80 BPM | TEMPERATURE: 97 F | SYSTOLIC BLOOD PRESSURE: 128 MMHG | OXYGEN SATURATION: 98 % | DIASTOLIC BLOOD PRESSURE: 80 MMHG | BODY MASS INDEX: 31.18 KG/M2 | WEIGHT: 176 LBS | HEIGHT: 63 IN

## 2023-06-26 DIAGNOSIS — M17.12 ARTHRITIS OF LEFT KNEE: ICD-10-CM

## 2023-06-26 DIAGNOSIS — Z00.00 ANNUAL PHYSICAL EXAM: Primary | ICD-10-CM

## 2023-06-26 DIAGNOSIS — E66.9 OBESITY (BMI 30-39.9): ICD-10-CM

## 2023-06-26 PROCEDURE — 99396 PREV VISIT EST AGE 40-64: CPT | Performed by: INTERNAL MEDICINE

## 2023-06-26 RX ORDER — MOMETASONE FUROATE AND FORMOTEROL FUMARATE DIHYDRATE 100; 5 UG/1; UG/1
2 AEROSOL RESPIRATORY (INHALATION) 2 TIMES DAILY
COMMUNITY

## 2023-06-26 NOTE — PROGRESS NOTES
ADULT ANNUAL [address] [facility] INTERNAL MEDICINE    NAME: Larry Robison  AGE: 60 y o  SEX: female  : 1960     DATE: 2023     Assessment and Plan:     Problem List Items Addressed This Visit        Other    Obesity (BMI 30-39  9)   Other Visit Diagnoses     Annual physical exam    -  Primary    Arthritis of left knee        Relevant Orders    Ambulatory Referral to Orthopedic Surgery        Up-to-date on CRC, mammogram, DEXA scan and Pap smears  Immunizations and preventive care screenings were discussed with patient today  Appropriate education was printed on patient's after visit summary  Counseling:  · Exercise: the importance of regular exercise/physical activity was discussed  Recommend exercise 3-5 times per week for at least 30 minutes  BMI Counseling: Body mass index is 31 18 kg/m²  The BMI is above normal  Nutrition recommendations include decreasing portion sizes, encouraging healthy choices of fruits and vegetables, decreasing fast food intake, consuming healthier snacks, limiting drinks that contain sugar, moderation in carbohydrate intake, increasing intake of lean protein, reducing intake of saturated and trans fat and reducing intake of cholesterol  Exercise recommendations include moderate physical activity 150 minutes/week  Rationale for BMI follow-up plan is due to patient being overweight or obese  Return in about 6 months (around 2023) for Next scheduled follow up  Chief Complaint:     Chief Complaint   Patient presents with   • Annual Exam     Yearly physical       History of Present Illness:     Adult Annual Physical   Patient here for a comprehensive physical exam  The patient reports no problems  Diet and Physical Activity  · Diet/Nutrition: well balanced diet  · Exercise: moderate cardiovascular exercise        Depression Screening  PHQ-2/9 Depression Screening         General Health  · Sleep: sleeps well  · Hearing: normal - bilateral   · Vision: no vision problems and wears glasses  · Dental: regular dental visits  /GYN Health  · Patient is: postmenopausal  · Last menstrual period:   · Contraceptive method: hysterectomy  Review of Systems:     Review of Systems   Constitutional: Negative for appetite change, chills, diaphoresis, fatigue, fever and unexpected weight change  Respiratory: Negative for apnea, cough, choking, chest tightness, shortness of breath, wheezing and stridor  Cardiovascular: Negative for chest pain, palpitations and leg swelling  Gastrointestinal: Negative for abdominal distention, abdominal pain, anal bleeding, blood in stool, constipation, diarrhea, nausea and vomiting  Genitourinary: Negative for decreased urine volume, difficulty urinating, frequency and urgency  Musculoskeletal: Negative for arthralgias, back pain and myalgias  Neurological: Negative for dizziness, light-headedness, numbness and headaches        Past Medical History:     Past Medical History:   Diagnosis Date   • Allergic rhinitis    • Asthma    • Disease of thyroid gland     Hashimoto's   • Fibroid    • Hiatal hernia    • Hypertension    • Hypothyroidism    • Primary osteoarthritis of knee     Left - last assessed: Aug 23, 2017   • Seasonal allergies    • Varicella       Past Surgical History:     Past Surgical History:   Procedure Laterality Date   • APPENDECTOMY     • COLONOSCOPY  06/30/2014   • DIAGNOSTIC LAPAROSCOPY     • HAND SURGERY Left    • HYSTERECTOMY  06/28/2021    cervix intact   • MAMMO (HISTORICAL)  11/26/2016   • OOPHORECTOMY Bilateral 06/28/2021   • THYROIDECTOMY  1998    partial      Social History:     Social History     Socioeconomic History   • Marital status:      Spouse name: None   • Number of children: None   • Years of education: None   • Highest education level: None   Occupational History   • None   Tobacco Use   • Smoking status: Never   • Smokeless tobacco: Never   Vaping Use   • Vaping Use: Never used   Substance and Sexual Activity   • Alcohol use:  Yes     Alcohol/week: 1 0 standard drink of alcohol     Types: 1 Glasses of wine per week     Comment: Occasionally   • Drug use: No   • Sexual activity: Not Currently     Partners: Male     Birth control/protection: Post-menopausal, Surgical   Other Topics Concern   • None   Social History Narrative    Marital status:  - As per [facility]     Social Determinants of Health     Financial Resource Strain: Not on file   Food Insecurity: Not on file   Transportation Needs: Not on file   Physical Activity: Not on file   Stress: Not on file   Social Connections: Not on file   Intimate Partner Violence: Not on file   Housing Stability: Not on file      Family History:     Family History   Problem Relation Age of Onset   • Hypertension Mother    • Hyperlipidemia Mother    • Skin cancer Mother    • Stroke Mother    • Arthritis Mother    • Cancer Mother         Skin   • Diabetes Father    • Heart attack Father    • Stroke Father    • Skin cancer Father    • Autoimmune disease Father    • Cancer Father         Skin   • Hearing loss Father    • No Known Problems Daughter    • Colon cancer Maternal Grandmother         >50   • Breast cancer Maternal Aunt         > 48   • No Known Problems Maternal Aunt    • No Known Problems Maternal Aunt    • Hyperlipidemia Family    • Hypertension Family    • Heart disease Family    • Ovarian cancer Neg Hx       Current Medications:     Current Outpatient Medications   Medication Sig Dispense Refill   • acetaminophen (TYLENOL) 500 mg tablet Take 500 mg by mouth every 6 (six) hours as needed     • albuterol (PROVENTIL HFA,VENTOLIN HFA) 90 mcg/act inhaler Inhale 1 puff every 4 (four) hours as needed     • ALPRAZolam (XANAX) 0 25 mg tablet once as needed  2   • Cholecalciferol (VITAMIN D-3) 5000 units TABS      • diclofenac sodium (VOLTAREN) 1 % if needed     • EPINEPHrine (EPIPEN) 0 3 mg/0 3 mL SOAJ      • estradiol (ESTRACE) 0 1 mg/g vaginal cream As needed     • Fexofenadine HCl (ALLEGRA PO) Take 1 tablet by mouth in the morning     • levalbuterol (XOPENEX HFA) 45 mcg/act inhaler Inhale 2 puffs every 4 (four) hours as needed     • losartan (COZAAR) 50 mg tablet Take 1 tablet (50 mg total) by mouth daily 90 tablet 3   • mometasone (NASONEX) 50 mcg/act nasal spray mometasone 50 mcg/actuation nasal spray     • mometasone-formoterol (Dulera) 100-5 MCG/ACT inhaler Inhale 2 puffs 2 (two) times a day Rinse mouth after use  • olopatadine (PATANOL) 0 1 % ophthalmic solution olopatadine 0 1 % eye drops     • ondansetron (ZOFRAN) 4 mg tablet Take 1 tablet (4 mg total) by mouth every 8 (eight) hours as needed for nausea or vomiting 20 tablet 0   • ORACEA 40 MG capsule Take 40 mg by mouth daily  3   • pantoprazole (PROTONIX) 40 mg tablet Take 40 mg by mouth daily     • PreviDent 5000 Booster Plus 1 1 % PSTE      • Restasis 0 05 % ophthalmic emulsion Administer 1 drop to both eyes every 12 (twelve) hours     • Salicylic Acid 6 % CREA As needed     • simethicone (MYLICON) 80 mg chewable tablet Chew every 8 (eight) hours     • SUMAtriptan (IMITREX) 50 mg tablet Take 1 tablet (50 mg total) by mouth once as needed for migraine for up to 1 dose 9 tablet 0   • Synthroid 100 MCG tablet Take 100 mcg by mouth every other day     • Synthroid 112 MCG tablet Take 112 mcg by mouth every other day     • urea (CARMOL) 40 % if needed     • Calcium Ascorbate 500 MG TABS TAKE 1 TABLET DAILY FOR 50 DAYS (Patient not taking: Reported on 6/26/2023)       No current facility-administered medications for this visit  Allergies:      Allergies   Allergen Reactions   • Other Allergic Rhinitis     Cats, dogs, grass, mildew, dust, mold   • Pollen Extract-Tree Extract [Pollen Extract] Allergic Rhinitis      Physical Exam:     /80 (BP Location: Left arm, Patient Position: Sitting, Cuff Size: Large) " Pulse 80   Temp (!) 97 °F (36 1 °C) (Temporal)   Ht 5' 3\" (1 6 m)   Wt 79 8 kg (176 lb)   LMP  (LMP Unknown)   SpO2 98% Comment: room air  BMI 31 18 kg/m²     Physical Exam  Constitutional:       General: She is not in acute distress  Appearance: Normal appearance  She is normal weight  She is not ill-appearing, toxic-appearing or diaphoretic  Cardiovascular:      Rate and Rhythm: Normal rate and regular rhythm  Pulses: Normal pulses  Heart sounds: Normal heart sounds  No murmur heard  No gallop  Pulmonary:      Effort: Pulmonary effort is normal  No respiratory distress  Breath sounds: Normal breath sounds  No stridor  No wheezing, rhonchi or rales  Chest:      Chest wall: No tenderness  Abdominal:      General: Abdomen is flat  Bowel sounds are normal  There is no distension  Palpations: Abdomen is soft  There is no mass  Tenderness: There is no abdominal tenderness  There is no guarding  Hernia: No hernia is present  Musculoskeletal:         General: Normal range of motion  Skin:     General: Skin is warm and dry  Capillary Refill: Capillary refill takes less than 2 seconds  Neurological:      General: No focal deficit present  Mental Status: She is alert            Dulce Chen MD  [facility] [address]  "

## 2023-06-26 NOTE — PATIENT INSTRUCTIONS
Wellness Visit for Adults   AMBULATORY CARE:   A wellness visit  is when you see your healthcare provider to get screened for health problems  Your healthcare provider will also give you advice on how to stay healthy  Write down your questions so you remember to ask them  Ask your healthcare provider how often you should have a wellness visit  What happens at a wellness visit:  Your healthcare provider will ask about your health, and your family history of health problems  This includes high blood pressure, heart disease, and cancer  He or she will ask if you have symptoms that concern you, if you smoke, and about your mood  You may also be asked about your intake of medicines, supplements, food, and alcohol  Any of the following may be done:  • Your weight  will be checked  Your height may also be checked so your body mass index (BMI) can be calculated  Your BMI shows if you are at a healthy weight  • Your blood pressure  and heart rate will be checked  Your temperature may also be checked  • Blood and urine tests  may be done  Blood tests may be done to check your cholesterol levels  Abnormal cholesterol levels increase your risk for heart disease and stroke  You may also need a blood or urine test to check for diabetes if you are at increased risk  Urine tests may be done to look for signs of an infection or kidney disease  • A physical exam  includes checking your heartbeat and lungs with a stethoscope  Your healthcare provider may also check your skin to look for sun damage  • Screening tests  may be recommended  A screening test is done to check for diseases that may not cause symptoms  The screening tests you may need depend on your age, gender, family history, and lifestyle habits  For example, colorectal screening may be recommended if you are 48years old or older  Screening tests you need if you are a woman:   • A Pap smear  is used to screen for cervical cancer   Pap smears are usually done every 3 to 5 years depending on your age  You may need them more often if you have had abnormal Pap smear test results in the past  Ask your healthcare provider how often you should have a Pap smear  • A mammogram  is an x-ray of your breasts to screen for breast cancer  Experts recommend mammograms every 2 years starting at age 48 years  You may need a mammogram at age 52 years or younger if you have an increased risk for breast cancer  Talk to your healthcare provider about when you should start having mammograms and how often you need them  Vaccines you may need:   • Get an influenza vaccine  every year  The influenza vaccine protects you from the flu  Several types of viruses cause the flu  The viruses change over time, so new vaccines are made each year  • Get a tetanus-diphtheria (Td) booster vaccine  every 10 years  This vaccine protects you against tetanus and diphtheria  Tetanus is a severe infection that may cause painful muscle spasms and lockjaw  Diphtheria is a severe bacterial infection that causes a thick covering in the back of your mouth and throat  • Get a human papillomavirus (HPV) vaccine  if you are female and aged 23 to 32 or male 23 to 24 and never received it  This vaccine protects you from HPV infection  HPV is the most common infection spread by sexual contact  HPV may also cause vaginal, penile, and anal cancers  • Get a pneumococcal vaccine  if you are aged 72 years or older  The pneumococcal vaccine is an injection given to protect you from pneumococcal disease  Pneumococcal disease is an infection caused by pneumococcal bacteria  The infection may cause pneumonia, meningitis, or an ear infection  • Get a shingles vaccine  if you are 60 or older, even if you have had shingles before  The shingles vaccine is an injection to protect you from the varicella-zoster virus  This is the same virus that causes chickenpox   Shingles is a painful rash that develops in people who had chickenpox or have been exposed to the virus  How to eat healthy:  My Plate is a model for planning healthy meals  It shows the types and amounts of foods that should go on your plate  Fruits and vegetables make up about half of your plate, and grains and protein make up the other half  A serving of dairy is included on the side of your plate  The amount of calories and serving sizes you need depends on your age, gender, weight, and height  Examples of healthy foods are listed below:  • Eat a variety of vegetables  such as dark green, red, and orange vegetables  You can also include canned vegetables low in sodium (salt) and frozen vegetables without added butter or sauces  • Eat a variety of fresh fruits , canned fruit in 100% juice, frozen fruit, and dried fruit  • Include whole grains  At least half of the grains you eat should be whole grains  Examples include whole-wheat bread, wheat pasta, brown rice, and whole-grain cereals such as oatmeal     • Eat a variety of protein foods such as seafood (fish and shellfish), lean meat, and poultry without skin (turkey and chicken)  Examples of lean meats include pork leg, shoulder, or tenderloin, and beef round, sirloin, tenderloin, and extra lean ground beef  Other protein foods include eggs and egg substitutes, beans, peas, soy products, nuts, and seeds  • Choose low-fat dairy products such as skim or 1% milk or low-fat yogurt, cheese, and cottage cheese  • Limit unhealthy fats  such as butter, hard margarine, and shortening  Exercise:  Exercise at least 30 minutes per day on most days of the week  Some examples of exercise include walking, biking, dancing, and swimming  You can also fit in more physical activity by taking the stairs instead of the elevator or parking farther away from stores  Include muscle strengthening activities 2 days each week  Regular exercise provides many health benefits   It helps you manage your weight, and decreases your risk for type 2 diabetes, heart disease, stroke, and high blood pressure  Exercise can also help improve your mood  Ask your healthcare provider about the best exercise plan for you  General health and safety guidelines:   • Do not smoke  Nicotine and other chemicals in cigarettes and cigars can cause lung damage  Ask your healthcare provider for information if you currently smoke and need help to quit  E-cigarettes or smokeless tobacco still contain nicotine  Talk to your healthcare provider before you use these products  • Limit alcohol  A drink of alcohol is 12 ounces of beer, 5 ounces of wine, or 1½ ounces of liquor  • Lose weight, if needed  Being overweight increases your risk of certain health conditions  These include heart disease, high blood pressure, type 2 diabetes, and certain types of cancer  • Protect your skin  Do not sunbathe or use tanning beds  Use sunscreen with a SPF 15 or higher  Apply sunscreen at least 15 minutes before you go outside  Reapply sunscreen every 2 hours  Wear protective clothing, hats, and sunglasses when you are outside  • Drive safely  Always wear your seatbelt  Make sure everyone in your car wears a seatbelt  A seatbelt can save your life if you are in an accident  Do not use your cell phone when you are driving  This could distract you and cause an accident  Pull over if you need to make a call or send a text message  • Practice safe sex  Use latex condoms if are sexually active and have more than one partner  Your healthcare provider may recommend screening tests for sexually transmitted infections (STIs)  • Wear helmets, lifejackets, and protective gear  Always wear a helmet when you ride a bike or motorcycle, go skiing, or play sports that could cause a head injury  Wear protective equipment when you play sports  Wear a lifejacket when you are on a boat or doing water sports      © Copyright [employer] 2022 Information is for End User's use only and may not be sold, redistributed or otherwise used for commercial purposes  The above information is an  only  It is not intended as medical advice for individual conditions or treatments  Talk to your doctor, nurse or pharmacist before following any medical regimen to see if it is safe and effective for you  Obesity   AMBULATORY CARE:   Obesity  means your body mass index (BMI) is greater than 30  Your healthcare provider will use your age, height, and weight to measure your BMI  The risks of obesity include  many health problems, including injuries or physical disability  • Diabetes (high blood sugar level)    • High blood pressure or high cholesterol    • Heart disease    • Stroke    • Gallbladder or liver disease    • Cancer of the colon, breast, prostate, liver, or kidney    • Sleep apnea    • Arthritis or gout    Screening  is done to check for health conditions before you have signs or symptoms  If you are 28to 79years old, your blood sugar level may be checked every 3 years for signs of prediabetes or diabetes  Your healthcare provider will check your blood pressure at each visit  High blood pressure can lead to a stroke or other problems  Your provider may check for signs of heart disease, cancer, or other health problems  Seek care immediately if:   • You have a severe headache, confusion, or difficulty speaking  • You have weakness on one side of your body  • You have chest pain, sweating, or shortness of breath  Call your doctor if:   • You have symptoms of gallbladder or liver disease, such as pain in your upper abdomen  • You have knee or hip pain and discomfort while walking  • You have symptoms of diabetes, such as intense hunger and thirst, and frequent urination  • You have symptoms of sleep apnea, such as snoring or daytime sleepiness  • You have questions or concerns about your condition or care      Treatment for obesity  focuses on helping you lose weight to improve your health  Even a small decrease in BMI can reduce the risk for many health problems  Your healthcare provider will help you set a weight-loss goal   • Lifestyle changes  are the first step in treating obesity  These include making healthy food choices and getting regular physical activity  Your healthcare provider may suggest a weight-loss program that involves coaching, education, and therapy  • Medicine  may help you lose weight when it is used with a healthy foods and physical activity  • Surgery  can help you lose weight if you have obesity along with other health problems  Several types of weight-loss surgery are available  Ask your healthcare provider for more information  Tips for safe weight loss:   • Set small, realistic goals  An example of a small goal is to walk for 20 minutes 5 days a week  Anther goal is to lose 5% of your body weight  • Ask for support  Tell friends, family members, and coworkers about your goals  Ask someone to lose weight with you  You may also want to join a weight-loss support group  • Identify foods or triggers that may cause you to overeat  Remove tempting high-calorie foods from your home and workplace  Place a bowl of fresh fruit on your kitchen counter  If stress causes you to eat, find other ways to cope with stress  A counselor or therapist may be able to help you  • Track your daily calories and activity  Write down what you eat and drink  Also write down how many minutes of physical activity you do each day  • Track your weekly weight  Weigh yourself in the morning, before you eat or drink anything but after you use the bathroom  Use the same scale, in the same place, and in similar clothing each time  Only weigh yourself 1 to 2 times each week, or as directed  You may become discouraged if you weigh yourself every day  Eating changes:   You will need to eat 500 to 1,000 fewer calories each day than you currently eat to lose 1 to 2 pounds a week  The following changes will help you cut calories:  • Eat smaller portions  Use small plates, no larger than 9 inches in diameter  Fill your plate half full of fruits and vegetables  Measure your food using measuring cups until you know what a serving size looks like  • Eat 3 meals and 1 or 2 snacks each day  Plan your meals in advance  [place] and eat at home most of the time  Eat slowly  Do not skip meals  Skipping meals can lead to overeating later in the day  This can make it harder for you to lose weight  Talk with a dietitian to help you make a meal plan and schedule that is right for you  • Eat fruits and vegetables at every meal   They are low in calories and high in fiber, which makes you feel full  Do not add butter, margarine, or cream sauce to vegetables  Use herbs to season steamed vegetables  • Eat less fat and fewer fried foods  Eat more baked or grilled chicken and fish  These protein sources are lower in calories and fat than red meat  Limit fast food  Dress your salads with olive oil and vinegar instead of bottled dressing  • Limit the amount of sugar you eat  Do not drink sugary beverages  Limit alcohol  Activity changes:  Physical activity is good for your body in many ways  It helps you burn calories and build strong muscles  It decreases stress and depression, and improves your mood  It can also help you sleep better  Talk to your healthcare provider before you begin an exercise program   • Exercise for at least 30 minutes 5 days a week  Start slowly  Set aside time each day for physical activity that you enjoy and that is convenient for you  It is best to do both weight training and an activity that increases your heart rate, such as walking, bicycling, or swimming  • Find ways to be more active  Do yard work and housecleaning  Walk up the stairs instead of using elevators   Spend your leisure time going to events that require walking, such as outdoor festivals or fairs  This extra physical activity can help you lose weight and keep it off  Follow up with your doctor as directed: You may need to meet with a dietitian  Write down your questions so you remember to ask them during your visits  © Copyright Rosalba [employer]on 2022 Information is for End User's use only and may not be sold, redistributed or otherwise used for commercial purposes  The above information is an  only  It is not intended as medical advice for individual conditions or treatments  Talk to your doctor, nurse or pharmacist before following any medical regimen to see if it is safe and effective for you

## 2023-08-25 ENCOUNTER — OFFICE VISIT (OUTPATIENT)
Dept: INTERNAL MEDICINE CLINIC | Facility: CLINIC | Age: 63
End: 2023-08-25
Payer: COMMERCIAL

## 2023-08-25 VITALS
OXYGEN SATURATION: 97 % | DIASTOLIC BLOOD PRESSURE: 82 MMHG | HEART RATE: 77 BPM | TEMPERATURE: 98.6 F | BODY MASS INDEX: 30.83 KG/M2 | SYSTOLIC BLOOD PRESSURE: 124 MMHG | HEIGHT: 63 IN | WEIGHT: 174 LBS

## 2023-08-25 DIAGNOSIS — R42 DIZZINESS: Primary | ICD-10-CM

## 2023-08-25 DIAGNOSIS — I10 PRIMARY HYPERTENSION: ICD-10-CM

## 2023-08-25 PROCEDURE — 99213 OFFICE O/P EST LOW 20 MIN: CPT | Performed by: INTERNAL MEDICINE

## 2023-08-25 RX ORDER — MECLIZINE HCL 12.5 MG/1
12.5 TABLET ORAL EVERY 8 HOURS PRN
Qty: 30 TABLET | Refills: 0 | Status: SHIPPED | OUTPATIENT
Start: 2023-08-25

## 2023-08-25 NOTE — PROGRESS NOTES
Assessment/Plan:    Diagnoses and all orders for this visit:    Dizziness  -     meclizine (ANTIVERT) 12.5 MG tablet; Take 1 tablet (12.5 mg total) by mouth every 8 (eight) hours as needed for dizziness    Primary hypertension       Unclear etiology, possible BPPV, will try meclizine, encouraged hydration,  monitor home bp, fup in 2 weeks. ER precautions for worsening symptoms and stroke symptoms given            Patient Instructions     How to Take a Blood Pressure Reading   WHAT YOU NEED TO KNOW:   Blood pressure (BP) is the force of blood pushing on the walls of your arteries. Your BP results are written as 2 numbers. The first, or top, number is called systolic BP. This is the pressure caused by your heart pushing blood out to your body. The second, or bottom, number is called diastolic BP. This is the pressure when your heart relaxes and fills back up with blood. Ask your healthcare provider what your BP should be. For most people, a good BP goal is less than 120/80. DISCHARGE INSTRUCTIONS:   Call your doctor if:   • Your BP is higher or lower than you were told it should be. • You have questions or concerns about your condition or care. Why you may need to take BP readings: You may not have any signs or symptoms of high BP. You may need to take BP readings regularly to know how often your BP is high. High BP increases your risk for a stroke, heart attack, or kidney disease. You may need to take medicine to keep your BP at a normal level. Write down and keep a log of your BP readings. Your healthcare provider can use the results to see if your BP medicines are working. How often to take your BP readings: Your healthcare provider may recommend that you take your BP readings 2 times a day. Take the readings at the same times each day, such as the morning and evening. How to take BP readings: You can take BP readings at home with a digital BP monitor.  Read the instructions that came with your BP monitor. The monitor comes with an adjustable cuff. Ask your healthcare provider if your cuff is the correct size. • Do not eat, drink, smoke, or exercise for 30 minutes before you take BP readings. • Rest quietly for 5 minutes before you begin. Do not talk while you take BP readings. • Sit with your feet flat on the floor and your back against a chair. • Put your arm straight out and support it on a flat surface. Your arm should be at chest level. Do not move your arm while you take your BP readings. • Make sure all of the air is out of the cuff. Place the BP cuff against your bare skin about 1 inch (2.5 cm) above your elbow. Wrap the cuff snugly around your arm. The BP reading may not be correct if the cuff is too loose. • If you are using a wrist cuff, wrap the cuff snugly around your wrist. Hold your wrist at the same level as your heart. • Turn on the BP monitor and follow the directions. • Write down your BP, the date, the time, and which arm you used to take BP reading. Take 2 BP readings and write down both results. Use the same arm each time. These BP readings can be 1 minute apart. What else you need to know:   • Do not take a BP reading in an arm that is injured or has an IV or shunt. The reading may not be accurate. • Do not stop taking your medicines if your BP is at your goal.  A BP at your goal means your medicine is working correctly. Take your BP medicines as directed. Follow up with your doctor as directed:  Bring the log of your BP readings. Also bring the BP machine. Healthcare providers can check that you are using the machine correctly. Write down your questions so you remember to ask them during your visits. © Copyright [employer] 2022 Information is for End User's use only and may not be sold, redistributed or otherwise used for commercial purposes. The above information is an  only.  It is not intended as medical advice for individual conditions or treatments. Talk to your doctor, nurse or pharmacist before following any medical regimen to see if it is safe and effective for you. Subjective:      Patient ID: Elisabeth Mathis is a 60 y.o. female    Pt c/o transient episode of dizziness last am when she woke up worsened with postural changes, symptom has since resolved spontaneously.  Denies SOB,CP, headache, vision changes waeakness, gait changes        Current Outpatient Medications:   •  acetaminophen (TYLENOL) 500 mg tablet, Take 500 mg by mouth every 6 (six) hours as needed, Disp: , Rfl:   •  albuterol (PROVENTIL HFA,VENTOLIN HFA) 90 mcg/act inhaler, Inhale 1 puff every 4 (four) hours as needed, Disp: , Rfl:   •  ALPRAZolam (XANAX) 0.25 mg tablet, once as needed, Disp: , Rfl: 2  •  Cholecalciferol (VITAMIN D-3) 5000 units TABS, , Disp: , Rfl:   •  diclofenac sodium (VOLTAREN) 1 %, if needed, Disp: , Rfl:   •  EPINEPHrine (EPIPEN) 0.3 mg/0.3 mL SOAJ, , Disp: , Rfl:   •  estradiol (ESTRACE) 0.1 mg/g vaginal cream, As needed, Disp: , Rfl:   •  Fexofenadine HCl (ALLEGRA PO), Take 1 tablet by mouth in the morning, Disp: , Rfl:   •  levalbuterol (XOPENEX HFA) 45 mcg/act inhaler, Inhale 2 puffs every 4 (four) hours as needed, Disp: , Rfl:   •  losartan (COZAAR) 50 mg tablet, Take 1 tablet (50 mg total) by mouth daily, Disp: 90 tablet, Rfl: 3  •  meclizine (ANTIVERT) 12.5 MG tablet, Take 1 tablet (12.5 mg total) by mouth every 8 (eight) hours as needed for dizziness, Disp: 30 tablet, Rfl: 0  •  mometasone (NASONEX) 50 mcg/act nasal spray, mometasone 50 mcg/actuation nasal spray, Disp: , Rfl:   •  mometasone-formoterol (Dulera) 100-5 MCG/ACT inhaler, Inhale 2 puffs 2 (two) times a day Rinse mouth after use., Disp: , Rfl:   •  olopatadine (PATANOL) 0.1 % ophthalmic solution, olopatadine 0.1 % eye drops, Disp: , Rfl:   •  ondansetron (ZOFRAN) 4 mg tablet, Take 1 tablet (4 mg total) by mouth every 8 (eight) hours as needed for nausea or vomiting, Disp: 20 tablet, Rfl: 0  •  pantoprazole (PROTONIX) 40 mg tablet, Take 40 mg by mouth daily, Disp: , Rfl:   •  PreviDent 5000 Booster Plus 1.1 % PSTE, , Disp: , Rfl:   •  Restasis 0.05 % ophthalmic emulsion, Administer 1 drop to both eyes every 12 (twelve) hours, Disp: , Rfl:   •  Salicylic Acid 6 % CREA, As needed, Disp: , Rfl:   •  SUMAtriptan (IMITREX) 50 mg tablet, Take 1 tablet (50 mg total) by mouth once as needed for migraine for up to 1 dose, Disp: 9 tablet, Rfl: 0  •  Synthroid 100 MCG tablet, Take 100 mcg by mouth every other day, Disp: , Rfl:   •  Synthroid 112 MCG tablet, Take 112 mcg by mouth every other day, Disp: , Rfl:   •  urea (CARMOL) 40 %, if needed, Disp: , Rfl:   •  Calcium Ascorbate 500 MG TABS, TAKE 1 TABLET DAILY FOR 50 DAYS (Patient not taking: Reported on 6/26/2023), Disp: , Rfl:   •  ORACEA 40 MG capsule, Take 40 mg by mouth daily (Patient not taking: Reported on 8/25/2023), Disp: , Rfl: 3  •  simethicone (MYLICON) 80 mg chewable tablet, Chew every 8 (eight) hours (Patient not taking: Reported on 8/25/2023), Disp: , Rfl:      Past Medical History:   Diagnosis Date   • Allergic rhinitis    • Asthma    • Disease of thyroid gland     Hashimoto's   • Fibroid    • Hiatal hernia    • Hypertension    • Hypothyroidism    • Primary osteoarthritis of knee     Left - last assessed: Aug 23, 2017   • Seasonal allergies    • Varicella          Past Surgical History:   Procedure Laterality Date   • APPENDECTOMY     • COLONOSCOPY  06/30/2014   • DIAGNOSTIC LAPAROSCOPY     • HAND SURGERY Left    • HYSTERECTOMY  06/28/2021    cervix intact   • MAMMO (HISTORICAL)  11/26/2016   • OOPHORECTOMY Bilateral 06/28/2021   • THYROIDECTOMY  1998    partial         Allergies   Allergen Reactions   • Other Allergic Rhinitis     Cats, dogs, grass, mildew, dust, mold   • Pollen Extract-Tree Extract [Pollen Extract] Allergic Rhinitis       No results found for this or any previous visit (from the past 1008 hour(s)). The following portions of the patient's history were reviewed and updated as appropriate: allergies, current medications, past family history, past medical history, past social history, past surgical history and problem list.     Review of Systems   Constitutional: Negative for appetite change, chills, diaphoresis, fatigue, fever and unexpected weight change. Respiratory: Negative for apnea, cough, choking, chest tightness, shortness of breath, wheezing and stridor. Cardiovascular: Negative for chest pain, palpitations and leg swelling. Gastrointestinal: Negative for abdominal distention, abdominal pain, anal bleeding, blood in stool, constipation, diarrhea, nausea and vomiting. Genitourinary: Negative for decreased urine volume, difficulty urinating, frequency and urgency. Musculoskeletal: Negative for arthralgias, back pain and myalgias. Neurological: Positive for dizziness. Negative for tremors, seizures, syncope, facial asymmetry, speech difficulty, weakness, light-headedness, numbness and headaches. Objective:      Vitals:    08/25/23 1127   BP: 124/82   Pulse: 77   Temp: 98.6 °F (37 °C)   SpO2: 97%          Physical Exam  Vitals reviewed. Constitutional:       General: She is not in acute distress. Appearance: Normal appearance. She is not ill-appearing, toxic-appearing or diaphoretic. HENT:      Mouth/Throat:      Mouth: Mucous membranes are moist.   Cardiovascular:      Rate and Rhythm: Normal rate and regular rhythm. Pulses: Normal pulses. Heart sounds: Normal heart sounds. No murmur heard. No friction rub. No gallop. Pulmonary:      Effort: Pulmonary effort is normal. No respiratory distress. Breath sounds: Normal breath sounds. No stridor. No wheezing, rhonchi or rales. Chest:      Chest wall: No tenderness. Musculoskeletal:      Right lower leg: No edema. Left lower leg: No edema. Skin:     General: Skin is warm and dry. Findings: No lesion or rash. Neurological:      Mental Status: She is alert. Cranial Nerves: No cranial nerve deficit, dysarthria or facial asymmetry. Sensory: Sensation is intact. Motor: No weakness, tremor, atrophy, abnormal muscle tone or pronator drift. Coordination: Coordination normal. Finger-Nose-Finger Test normal.      Gait: Gait is intact. Deep Tendon Reflexes:      Reflex Scores:       Patellar reflexes are 2+ on the right side and 2+ on the left side.

## 2023-08-25 NOTE — PATIENT INSTRUCTIONS
How to Take a Blood Pressure Reading   WHAT YOU NEED TO KNOW:   Blood pressure (BP) is the force of blood pushing on the walls of your arteries. Your BP results are written as 2 numbers. The first, or top, number is called systolic BP. This is the pressure caused by your heart pushing blood out to your body. The second, or bottom, number is called diastolic BP. This is the pressure when your heart relaxes and fills back up with blood. Ask your healthcare provider what your BP should be. For most people, a good BP goal is less than 120/80. DISCHARGE INSTRUCTIONS:   Call your doctor if:   Your BP is higher or lower than you were told it should be. You have questions or concerns about your condition or care. Why you may need to take BP readings: You may not have any signs or symptoms of high BP. You may need to take BP readings regularly to know how often your BP is high. High BP increases your risk for a stroke, heart attack, or kidney disease. You may need to take medicine to keep your BP at a normal level. Write down and keep a log of your BP readings. Your healthcare provider can use the results to see if your BP medicines are working. How often to take your BP readings: Your healthcare provider may recommend that you take your BP readings 2 times a day. Take the readings at the same times each day, such as the morning and evening. How to take BP readings: You can take BP readings at home with a digital BP monitor. Read the instructions that came with your BP monitor. The monitor comes with an adjustable cuff. Ask your healthcare provider if your cuff is the correct size. Do not eat, drink, smoke, or exercise for 30 minutes before you take BP readings. Rest quietly for 5 minutes before you begin. Do not talk while you take BP readings. Sit with your feet flat on the floor and your back against a chair. Put your arm straight out and support it on a flat surface.  Your arm should be at chest level. Do not move your arm while you take your BP readings. Make sure all of the air is out of the cuff. Place the BP cuff against your bare skin about 1 inch (2.5 cm) above your elbow. Wrap the cuff snugly around your arm. The BP reading may not be correct if the cuff is too loose. If you are using a wrist cuff, wrap the cuff snugly around your wrist. Hold your wrist at the same level as your heart. Turn on the BP monitor and follow the directions. Write down your BP, the date, the time, and which arm you used to take BP reading. Take 2 BP readings and write down both results. Use the same arm each time. These BP readings can be 1 minute apart. What else you need to know:   Do not take a BP reading in an arm that is injured or has an IV or shunt. The reading may not be accurate. Do not stop taking your medicines if your BP is at your goal.  A BP at your goal means your medicine is working correctly. Take your BP medicines as directed. Follow up with your doctor as directed:  Bring the log of your BP readings. Also bring the BP machine. Healthcare providers can check that you are using the machine correctly. Write down your questions so you remember to ask them during your visits. © Copyright [employer] 2022 Information is for End User's use only and may not be sold, redistributed or otherwise used for commercial purposes. The above information is an  only. It is not intended as medical advice for individual conditions or treatments. Talk to your doctor, nurse or pharmacist before following any medical regimen to see if it is safe and effective for you.

## 2023-09-14 ENCOUNTER — OFFICE VISIT (OUTPATIENT)
Dept: INTERNAL MEDICINE CLINIC | Facility: CLINIC | Age: 63
End: 2023-09-14
Payer: COMMERCIAL

## 2023-09-14 VITALS
SYSTOLIC BLOOD PRESSURE: 142 MMHG | DIASTOLIC BLOOD PRESSURE: 71 MMHG | OXYGEN SATURATION: 96 % | BODY MASS INDEX: 30.83 KG/M2 | HEIGHT: 63 IN | HEART RATE: 74 BPM | WEIGHT: 174 LBS | TEMPERATURE: 98.3 F

## 2023-09-14 DIAGNOSIS — E66.09 CLASS 1 OBESITY DUE TO EXCESS CALORIES WITH SERIOUS COMORBIDITY AND BODY MASS INDEX (BMI) OF 30.0 TO 30.9 IN ADULT: ICD-10-CM

## 2023-09-14 DIAGNOSIS — I10 PRIMARY HYPERTENSION: ICD-10-CM

## 2023-09-14 DIAGNOSIS — M17.12 ARTHRITIS OF LEFT KNEE: Primary | ICD-10-CM

## 2023-09-14 PROBLEM — E66.811 CLASS 1 OBESITY DUE TO EXCESS CALORIES WITH SERIOUS COMORBIDITY AND BODY MASS INDEX (BMI) OF 30.0 TO 30.9 IN ADULT: Status: ACTIVE | Noted: 2023-09-14

## 2023-09-14 PROBLEM — E26.9 HYPERALDOSTERONISM (HCC): Status: ACTIVE | Noted: 2023-09-14

## 2023-09-14 PROCEDURE — 99213 OFFICE O/P EST LOW 20 MIN: CPT | Performed by: INTERNAL MEDICINE

## 2023-09-14 NOTE — PROGRESS NOTES
Assessment/Plan:    Diagnoses and all orders for this visit:    Arthritis of left knee  -     Ambulatory Referral to Physical Therapy; Future  -     Ambulatory Referral to Weight Management; Future    Primary hypertension    Class 1 obesity due to excess calories with serious comorbidity and body mass index (BMI) of 30.0 to 30.9 in adult  -     Ambulatory Referral to Weight Management; Future       Dizziness symptoms have now resolved, no BP logs to review, repeat BP by me 150/72, encourage patient to maintain BP logs and bring it to the next office visit. Encourage consistent medication intake, low-salt diet, weight loss, exercise lifestyle modification,    Osteoarthritis of left knee-follows up with orthopedics, recent hyaluronic acid injections to the left knee without much symptom improvement. Recommend weight loss physical therapy topical agents, and Tylenol as needed for pain relief. Patient Instructions     How to Take a Blood Pressure Reading   WHAT YOU NEED TO KNOW:   Blood pressure (BP) is the force of blood pushing on the walls of your arteries. Your BP results are written as 2 numbers. The first, or top, number is called systolic BP. This is the pressure caused by your heart pushing blood out to your body. The second, or bottom, number is called diastolic BP. This is the pressure when your heart relaxes and fills back up with blood. Ask your healthcare provider what your BP should be. For most people, a good BP goal is less than 120/80. DISCHARGE INSTRUCTIONS:   Call your doctor if:   • Your BP is higher or lower than you were told it should be. • You have questions or concerns about your condition or care. Why you may need to take BP readings: You may not have any signs or symptoms of high BP. You may need to take BP readings regularly to know how often your BP is high. High BP increases your risk for a stroke, heart attack, or kidney disease.  You may need to take medicine to keep your BP at a normal level. Write down and keep a log of your BP readings. Your healthcare provider can use the results to see if your BP medicines are working. How often to take your BP readings: Your healthcare provider may recommend that you take your BP readings 2 times a day. Take the readings at the same times each day, such as the morning and evening. How to take BP readings: You can take BP readings at home with a digital BP monitor. Read the instructions that came with your BP monitor. The monitor comes with an adjustable cuff. Ask your healthcare provider if your cuff is the correct size. • Do not eat, drink, smoke, or exercise for 30 minutes before you take BP readings. • Rest quietly for 5 minutes before you begin. Do not talk while you take BP readings. • Sit with your feet flat on the floor and your back against a chair. • Put your arm straight out and support it on a flat surface. Your arm should be at chest level. Do not move your arm while you take your BP readings. • Make sure all of the air is out of the cuff. Place the BP cuff against your bare skin about 1 inch (2.5 cm) above your elbow. Wrap the cuff snugly around your arm. The BP reading may not be correct if the cuff is too loose. • If you are using a wrist cuff, wrap the cuff snugly around your wrist. Hold your wrist at the same level as your heart. • Turn on the BP monitor and follow the directions. • Write down your BP, the date, the time, and which arm you used to take BP reading. Take 2 BP readings and write down both results. Use the same arm each time. These BP readings can be 1 minute apart. What else you need to know:   • Do not take a BP reading in an arm that is injured or has an IV or shunt. The reading may not be accurate. • Do not stop taking your medicines if your BP is at your goal.  A BP at your goal means your medicine is working correctly. Take your BP medicines as directed.     Follow up with your doctor as directed:  Bring the log of your BP readings. Also bring the BP machine. Healthcare providers can check that you are using the machine correctly. Write down your questions so you remember to ask them during your visits. © Copyright [employer] 2022 Information is for End User's use only and may not be sold, redistributed or otherwise used for commercial purposes. The above information is an  only. It is not intended as medical advice for individual conditions or treatments. Talk to your doctor, nurse or pharmacist before following any medical regimen to see if it is safe and effective for you. Knee Exercises   AMBULATORY CARE:   What you need to know about knee exercises:  Knee exercises help strengthen the muscles around your knee. Strong muscles can help reduce pain and decrease your risk of future injury. Knee exercises also help you heal after an injury or surgery. These are beginning exercises. Ask your healthcare provider if you need to see a physical therapist for more advanced exercises. General guidelines for knee exercises:   • Start slowly. As you get stronger, you may be able to do more sets of each exercise or add weights. • Stop if you feel pain. It is normal to feel some discomfort at first, but you should not feel pain. Tell your provider or physical therapist if you have pain while you exercise. Regular exercise will help decrease your discomfort over time. • Do the exercises on both legs. Do this so both knees remain strong. • Warm up before you do knee exercises. Walk or ride a stationary bike for 5 or 10 minutes to warm your muscles. How to perform knee stretches safely:  Always stretch before you do strengthening exercises. Do these stretching exercises again after you do the strengthening exercises. Do these stretches 4 or 5 days a week, or as directed. • Standing calf stretch:   Face a wall and place both palms flat on the wall, or hold the back of a chair for balance. Keep a slight bend in your knees. Take a big step backward with one leg. Keep your other leg directly under you. Keep both heels flat and press your hips forward. Hold the stretch for 30 seconds, and then relax for 30 seconds. Switch legs. Repeat 2 or 3 times on each leg. • Standing quadriceps stretch:  Stand and place one hand against a wall or hold the back of a chair for balance. With your weight on one leg, bend your other leg and grab your ankle. Bring your heel toward your buttocks. Hold the stretch for 30 to 60 seconds. Switch legs. Repeat 2 or 3 times on each leg. • Sitting hamstring stretch:  Sit with both legs straight in front of you. Do not point or flex your toes. Place your palms on the floor and slide your hands forward until you feel the stretch. Do not round your back. Hold the stretch for 30 seconds. Repeat 2 or 3 times. How to perform knee strengthening exercises safely:  Do these exercises 4 or 5 days a week, or as directed. • Standing half squats:  Stand with your feet shoulder-width apart. Lean your back against a wall or hold the back of a chair for balance, if needed. Slowly sit down about 10 inches, as if you are going to sit in a chair. Your body weight should be mostly over your heels. Hold the squat for 5 seconds, then rise to a standing position. Do 3 sets of 10 squats to strengthen your buttocks and thighs. • Standing hamstring curls: Face a wall and place both palms flat on the wall, or hold the back of a chair for balance. With your weight on one leg, lift your other foot as close to your buttocks as you can. Hold for 5 seconds and then lower your leg. Do 2 sets of 10 curls on each leg. This exercise strengthens the muscles in the back of your thigh. • Standing calf raises:  Face a wall and place both palms flat on the wall, or hold the back of a chair for balance. Stand up straight, and do not lean.  Place all your weight on one leg by lifting the other foot off the floor. Raise the heel of the foot that is on the floor as high as you can and then lower it. Do 2 sets of 10 calf raises on each leg to strengthen your calf muscles. • Straight leg lifts:  Lie on your stomach with straight legs. Fold your arms in front of you and rest your head in your arms. Tighten your leg muscles and raise one leg as high as you can. Hold for 5 seconds, then lower your leg. Do 2 sets of 10 lifts on each leg to strengthen your buttocks. • Sitting leg lifts:  Sit in a chair. Slowly straighten and raise one leg. Squeeze your thigh muscles and hold for 5 seconds. Relax and return your foot to the floor. Do 2 sets of 10 lifts on each leg. This helps strengthen the muscles in the front of your thigh. Call your doctor or physical therapist if:   • You have new pain or your pain becomes worse. • You have questions or concerns about your condition or care. © Copyright [facility] 2022 Information is for End User's use only and may not be sold, redistributed or otherwise used for commercial purposes. The above information is an  only. It is not intended as medical advice for individual conditions or treatments. Talk to your doctor, nurse or pharmacist before following any medical regimen to see if it is safe and effective for you. Subjective:      Patient ID: Karina Monterroso is a 60 y.o. female    Patient comes for follow-up of dizziness which is now improved, hypertension, left knee pain secondary to osteoarthritis.         Current Outpatient Medications:   •  acetaminophen (TYLENOL) 500 mg tablet, Take 500 mg by mouth every 6 (six) hours as needed, Disp: , Rfl:   •  albuterol (PROVENTIL HFA,VENTOLIN HFA) 90 mcg/act inhaler, Inhale 1 puff every 4 (four) hours as needed, Disp: , Rfl:   •  ALPRAZolam (XANAX) 0.25 mg tablet, once as needed, Disp: , Rfl: 2  •  Cholecalciferol (VITAMIN D-3) 5000 units TABS, , Disp: , Rfl:   •  diclofenac sodium (VOLTAREN) 1 %, if needed, Disp: , Rfl:   •  EPINEPHrine (EPIPEN) 0.3 mg/0.3 mL SOAJ, , Disp: , Rfl:   •  estradiol (ESTRACE) 0.1 mg/g vaginal cream, As needed, Disp: , Rfl:   •  Fexofenadine HCl (ALLEGRA PO), Take 1 tablet by mouth in the morning, Disp: , Rfl:   •  levalbuterol (XOPENEX HFA) 45 mcg/act inhaler, Inhale 2 puffs every 4 (four) hours as needed, Disp: , Rfl:   •  losartan (COZAAR) 50 mg tablet, Take 1 tablet (50 mg total) by mouth daily, Disp: 90 tablet, Rfl: 3  •  meclizine (ANTIVERT) 12.5 MG tablet, Take 1 tablet (12.5 mg total) by mouth every 8 (eight) hours as needed for dizziness, Disp: 30 tablet, Rfl: 0  •  mometasone (NASONEX) 50 mcg/act nasal spray, mometasone 50 mcg/actuation nasal spray, Disp: , Rfl:   •  mometasone-formoterol (Dulera) 100-5 MCG/ACT inhaler, Inhale 2 puffs 2 (two) times a day Rinse mouth after use., Disp: , Rfl:   •  olopatadine (PATANOL) 0.1 % ophthalmic solution, olopatadine 0.1 % eye drops, Disp: , Rfl:   •  ondansetron (ZOFRAN) 4 mg tablet, Take 1 tablet (4 mg total) by mouth every 8 (eight) hours as needed for nausea or vomiting, Disp: 20 tablet, Rfl: 0  •  pantoprazole (PROTONIX) 40 mg tablet, Take 40 mg by mouth daily, Disp: , Rfl:   •  PreviDent 5000 Booster Plus 1.1 % PSTE, , Disp: , Rfl:   •  Restasis 0.05 % ophthalmic emulsion, Administer 1 drop to both eyes every 12 (twelve) hours, Disp: , Rfl:   •  Salicylic Acid 6 % CREA, As needed, Disp: , Rfl:   •  SUMAtriptan (IMITREX) 50 mg tablet, Take 1 tablet (50 mg total) by mouth once as needed for migraine for up to 1 dose, Disp: 9 tablet, Rfl: 0  •  Synthroid 100 MCG tablet, Take 100 mcg by mouth every other day, Disp: , Rfl:   •  Synthroid 112 MCG tablet, Take 112 mcg by mouth every other day, Disp: , Rfl:   •  urea (CARMOL) 40 %, if needed, Disp: , Rfl:   •  Calcium Ascorbate 500 MG TABS, TAKE 1 TABLET DAILY FOR 50 DAYS (Patient not taking: Reported on 6/26/2023), Disp: , Rfl: •  ORACEA 40 MG capsule, Take 40 mg by mouth daily (Patient not taking: Reported on 8/25/2023), Disp: , Rfl: 3  •  simethicone (MYLICON) 80 mg chewable tablet, Chew every 8 (eight) hours (Patient not taking: Reported on 8/25/2023), Disp: , Rfl:      Past Medical History:   Diagnosis Date   • Allergic rhinitis    • Asthma    • Disease of thyroid gland     Hashimoto's   • Fibroid    • Hiatal hernia    • Hypertension    • Hypothyroidism    • Primary osteoarthritis of knee     Left - last assessed: Aug 23, 2017   • Seasonal allergies    • Varicella          Past Surgical History:   Procedure Laterality Date   • APPENDECTOMY     • COLONOSCOPY  06/30/2014   • DIAGNOSTIC LAPAROSCOPY     • HAND SURGERY Left    • HYSTERECTOMY  06/28/2021    cervix intact   • MAMMO (HISTORICAL)  11/26/2016   • OOPHORECTOMY Bilateral 06/28/2021   • THYROIDECTOMY  1998    partial         Allergies   Allergen Reactions   • Other Allergic Rhinitis     Cats, dogs, grass, mildew, dust, mold   • Pollen Extract-Tree Extract [Pollen Extract] Allergic Rhinitis       No results found for this or any previous visit (from the past 1008 hour(s)). The following portions of the patient's history were reviewed and updated as appropriate: allergies, current medications, past family history, past medical history, past social history, past surgical history and problem list.     Review of Systems   Constitutional: Negative for appetite change, chills, diaphoresis, fatigue, fever and unexpected weight change. Respiratory: Negative for apnea, cough, choking, chest tightness, shortness of breath, wheezing and stridor. Cardiovascular: Negative for chest pain, palpitations and leg swelling. Gastrointestinal: Negative for abdominal distention, abdominal pain, anal bleeding, blood in stool, constipation, diarrhea, nausea and vomiting. Genitourinary: Negative for decreased urine volume, difficulty urinating, frequency and urgency.    Musculoskeletal: Negative for arthralgias, back pain and myalgias. Neurological: Negative for dizziness, light-headedness, numbness and headaches. Objective:      Vitals:    09/14/23 1632   BP: 142/71   Pulse: 74   Temp: 98.3 °F (36.8 °C)   SpO2: 96%          Physical Exam  Vitals reviewed. Constitutional:       General: She is not in acute distress. Appearance: Normal appearance. She is not ill-appearing, toxic-appearing or diaphoretic. HENT:      Mouth/Throat:      Mouth: Mucous membranes are moist.   Cardiovascular:      Rate and Rhythm: Normal rate and regular rhythm. Pulses: Normal pulses. Heart sounds: Normal heart sounds. No murmur heard. No friction rub. No gallop. Pulmonary:      Effort: Pulmonary effort is normal. No respiratory distress. Breath sounds: Normal breath sounds. No stridor. No wheezing, rhonchi or rales. Chest:      Chest wall: No tenderness. Musculoskeletal:      Right lower leg: No edema. Left lower leg: No edema. Skin:     General: Skin is warm and dry. Findings: No lesion or rash. Neurological:      Mental Status: She is alert.

## 2023-09-14 NOTE — PATIENT INSTRUCTIONS
How to Take a Blood Pressure Reading   WHAT YOU NEED TO KNOW:   Blood pressure (BP) is the force of blood pushing on the walls of your arteries. Your BP results are written as 2 numbers. The first, or top, number is called systolic BP. This is the pressure caused by your heart pushing blood out to your body. The second, or bottom, number is called diastolic BP. This is the pressure when your heart relaxes and fills back up with blood. Ask your healthcare provider what your BP should be. For most people, a good BP goal is less than 120/80. DISCHARGE INSTRUCTIONS:   Call your doctor if:   Your BP is higher or lower than you were told it should be. You have questions or concerns about your condition or care. Why you may need to take BP readings: You may not have any signs or symptoms of high BP. You may need to take BP readings regularly to know how often your BP is high. High BP increases your risk for a stroke, heart attack, or kidney disease. You may need to take medicine to keep your BP at a normal level. Write down and keep a log of your BP readings. Your healthcare provider can use the results to see if your BP medicines are working. How often to take your BP readings: Your healthcare provider may recommend that you take your BP readings 2 times a day. Take the readings at the same times each day, such as the morning and evening. How to take BP readings: You can take BP readings at home with a digital BP monitor. Read the instructions that came with your BP monitor. The monitor comes with an adjustable cuff. Ask your healthcare provider if your cuff is the correct size. Do not eat, drink, smoke, or exercise for 30 minutes before you take BP readings. Rest quietly for 5 minutes before you begin. Do not talk while you take BP readings. Sit with your feet flat on the floor and your back against a chair. Put your arm straight out and support it on a flat surface.  Your arm should be at chest level. Do not move your arm while you take your BP readings. Make sure all of the air is out of the cuff. Place the BP cuff against your bare skin about 1 inch (2.5 cm) above your elbow. Wrap the cuff snugly around your arm. The BP reading may not be correct if the cuff is too loose. If you are using a wrist cuff, wrap the cuff snugly around your wrist. Hold your wrist at the same level as your heart. Turn on the BP monitor and follow the directions. Write down your BP, the date, the time, and which arm you used to take BP reading. Take 2 BP readings and write down both results. Use the same arm each time. These BP readings can be 1 minute apart. What else you need to know:   Do not take a BP reading in an arm that is injured or has an IV or shunt. The reading may not be accurate. Do not stop taking your medicines if your BP is at your goal.  A BP at your goal means your medicine is working correctly. Take your BP medicines as directed. Follow up with your doctor as directed:  Bring the log of your BP readings. Also bring the BP machine. Healthcare providers can check that you are using the machine correctly. Write down your questions so you remember to ask them during your visits. © Copyright [facility] 2022 Information is for End User's use only and may not be sold, redistributed or otherwise used for commercial purposes. The above information is an  only. It is not intended as medical advice for individual conditions or treatments. Talk to your doctor, nurse or pharmacist before following any medical regimen to see if it is safe and effective for you. Knee Exercises   AMBULATORY CARE:   What you need to know about knee exercises:  Knee exercises help strengthen the muscles around your knee. Strong muscles can help reduce pain and decrease your risk of future injury. Knee exercises also help you heal after an injury or surgery. These are beginning exercises.  Ask your healthcare provider if you need to see a physical therapist for more advanced exercises. General guidelines for knee exercises:   Start slowly. As you get stronger, you may be able to do more sets of each exercise or add weights. Stop if you feel pain. It is normal to feel some discomfort at first, but you should not feel pain. Tell your provider or physical therapist if you have pain while you exercise. Regular exercise will help decrease your discomfort over time. Do the exercises on both legs. Do this so both knees remain strong. Warm up before you do knee exercises. Walk or ride a stationary bike for 5 or 10 minutes to warm your muscles. How to perform knee stretches safely:  Always stretch before you do strengthening exercises. Do these stretching exercises again after you do the strengthening exercises. Do these stretches 4 or 5 days a week, or as directed. Standing calf stretch: Face a wall and place both palms flat on the wall, or hold the back of a chair for balance. Keep a slight bend in your knees. Take a big step backward with one leg. Keep your other leg directly under you. Keep both heels flat and press your hips forward. Hold the stretch for 30 seconds, and then relax for 30 seconds. Switch legs. Repeat 2 or 3 times on each leg. Standing quadriceps stretch:  Stand and place one hand against a wall or hold the back of a chair for balance. With your weight on one leg, bend your other leg and grab your ankle. Bring your heel toward your buttocks. Hold the stretch for 30 to 60 seconds. Switch legs. Repeat 2 or 3 times on each leg. Sitting hamstring stretch:  Sit with both legs straight in front of you. Do not point or flex your toes. Place your palms on the floor and slide your hands forward until you feel the stretch. Do not round your back. Hold the stretch for 30 seconds. Repeat 2 or 3 times.        How to perform knee strengthening exercises safely:  Do these exercises 4 or 5 days a week, or as directed. Standing half squats:  Stand with your feet shoulder-width apart. Lean your back against a wall or hold the back of a chair for balance, if needed. Slowly sit down about 10 inches, as if you are going to sit in a chair. Your body weight should be mostly over your heels. Hold the squat for 5 seconds, then rise to a standing position. Do 3 sets of 10 squats to strengthen your buttocks and thighs. Standing hamstring curls: Face a wall and place both palms flat on the wall, or hold the back of a chair for balance. With your weight on one leg, lift your other foot as close to your buttocks as you can. Hold for 5 seconds and then lower your leg. Do 2 sets of 10 curls on each leg. This exercise strengthens the muscles in the back of your thigh. Standing calf raises:  Face a wall and place both palms flat on the wall, or hold the back of a chair for balance. Stand up straight, and do not lean. Place all your weight on one leg by lifting the other foot off the floor. Raise the heel of the foot that is on the floor as high as you can and then lower it. Do 2 sets of 10 calf raises on each leg to strengthen your calf muscles. Straight leg lifts:  Lie on your stomach with straight legs. Fold your arms in front of you and rest your head in your arms. Tighten your leg muscles and raise one leg as high as you can. Hold for 5 seconds, then lower your leg. Do 2 sets of 10 lifts on each leg to strengthen your buttocks. Sitting leg lifts:  Sit in a chair. Slowly straighten and raise one leg. Squeeze your thigh muscles and hold for 5 seconds. Relax and return your foot to the floor. Do 2 sets of 10 lifts on each leg. This helps strengthen the muscles in the front of your thigh. Call your doctor or physical therapist if:   You have new pain or your pain becomes worse. You have questions or concerns about your condition or care.     © Copyright [employer] 2022 Information is for End User's use only and may not be sold, redistributed or otherwise used for commercial purposes. The above information is an  only. It is not intended as medical advice for individual conditions or treatments. Talk to your doctor, nurse or pharmacist before following any medical regimen to see if it is safe and effective for you.

## 2023-10-04 ENCOUNTER — TELEPHONE (OUTPATIENT)
Dept: INTERNAL MEDICINE CLINIC | Facility: CLINIC | Age: 63
End: 2023-10-04

## 2023-10-04 NOTE — TELEPHONE ENCOUNTER
Hi, my name is Ruperto De La Garza. .I'm a patient doctor Kristofer Walker, and I have a question. I think I have gout in my left elbow, which I'm treating to be, you know, doing OK. But I'm scheduled to get my COVID and flu vaccine this Saturday at [facility]. I was wondering if it's still OK to do that even though I think I have gout in my left elbow.

## 2023-10-05 ENCOUNTER — TELEPHONE (OUTPATIENT)
Dept: INTERNAL MEDICINE CLINIC | Facility: CLINIC | Age: 63
End: 2023-10-05

## 2023-10-06 ENCOUNTER — OFFICE VISIT (OUTPATIENT)
Dept: INTERNAL MEDICINE CLINIC | Facility: CLINIC | Age: 63
End: 2023-10-06
Payer: COMMERCIAL

## 2023-10-06 VITALS
DIASTOLIC BLOOD PRESSURE: 80 MMHG | SYSTOLIC BLOOD PRESSURE: 110 MMHG | WEIGHT: 171 LBS | BODY MASS INDEX: 30.29 KG/M2 | OXYGEN SATURATION: 99 % | TEMPERATURE: 99.5 F | HEART RATE: 93 BPM

## 2023-10-06 DIAGNOSIS — M25.522 LEFT ELBOW PAIN: ICD-10-CM

## 2023-10-06 DIAGNOSIS — R53.83 OTHER FATIGUE: ICD-10-CM

## 2023-10-06 DIAGNOSIS — M25.512 ACUTE PAIN OF LEFT SHOULDER: Primary | ICD-10-CM

## 2023-10-06 PROCEDURE — 99213 OFFICE O/P EST LOW 20 MIN: CPT | Performed by: INTERNAL MEDICINE

## 2023-10-06 NOTE — PROGRESS NOTES
Assessment/Plan:    Diagnoses and all orders for this visit:    Acute pain of left shoulder    Left elbow pain    Other fatigue         Symptoms likely viral in etiology, discussed supportive management including hydration, as needed Tylenol for pain, additionally can use local agents like Voltaren gel for symptom improvement. Follow-up if symptoms are not improved in the next few days. Recommend to reschedule upcoming COVID and flu vaccination. There are no Patient Instructions on file for this visit. Subjective:      Patient ID: Johnna Ramon is a 60 y.o. female    Patient reports the pain, redness with minimal swelling on the left elbow last few days, which is now improved, no complaints of pain in the left shoulder. Also noticed fatigue. Denies any fever chills nausea vomiting diarrhea or chest pain.         Current Outpatient Medications:   •  acetaminophen (TYLENOL) 500 mg tablet, Take 500 mg by mouth every 6 (six) hours as needed, Disp: , Rfl:   •  Cholecalciferol (VITAMIN D-3) 5000 units TABS, , Disp: , Rfl:   •  diclofenac sodium (VOLTAREN) 1 %, if needed, Disp: , Rfl:   •  EPINEPHrine (EPIPEN) 0.3 mg/0.3 mL SOAJ, , Disp: , Rfl:   •  estradiol (ESTRACE) 0.1 mg/g vaginal cream, As needed, Disp: , Rfl:   •  Fexofenadine HCl (ALLEGRA PO), Take 1 tablet by mouth in the morning, Disp: , Rfl:   •  losartan (COZAAR) 50 mg tablet, Take 1 tablet (50 mg total) by mouth daily, Disp: 90 tablet, Rfl: 3  •  mometasone (NASONEX) 50 mcg/act nasal spray, mometasone 50 mcg/actuation nasal spray, Disp: , Rfl:   •  mometasone-formoterol (Dulera) 100-5 MCG/ACT inhaler, Inhale 2 puffs 2 (two) times a day Rinse mouth after use., Disp: , Rfl:   •  ondansetron (ZOFRAN) 4 mg tablet, Take 1 tablet (4 mg total) by mouth every 8 (eight) hours as needed for nausea or vomiting, Disp: 20 tablet, Rfl: 0  •  ORACEA 40 MG capsule, Take 40 mg by mouth daily, Disp: , Rfl: 3  •  pantoprazole (PROTONIX) 40 mg tablet, Take 40 mg by mouth daily, Disp: , Rfl:   •  PreviDent 5000 Booster Plus 1.1 % PSTE, , Disp: , Rfl:   •  Restasis 0.05 % ophthalmic emulsion, Administer 1 drop to both eyes every 12 (twelve) hours, Disp: , Rfl:   •  SUMAtriptan (IMITREX) 50 mg tablet, Take 1 tablet (50 mg total) by mouth once as needed for migraine for up to 1 dose, Disp: 9 tablet, Rfl: 0  •  Synthroid 100 MCG tablet, Take 100 mcg by mouth every other day, Disp: , Rfl:   •  Synthroid 112 MCG tablet, Take 112 mcg by mouth every other day, Disp: , Rfl:   •  albuterol (PROVENTIL HFA,VENTOLIN HFA) 90 mcg/act inhaler, Inhale 1 puff every 4 (four) hours as needed (Patient not taking: Reported on 10/6/2023), Disp: , Rfl:   •  ALPRAZolam (XANAX) 0.25 mg tablet, once as needed (Patient not taking: Reported on 10/6/2023), Disp: , Rfl: 2  •  Calcium Ascorbate 500 MG TABS, TAKE 1 TABLET DAILY FOR 50 DAYS (Patient not taking: Reported on 6/26/2023), Disp: , Rfl:   •  levalbuterol (XOPENEX HFA) 45 mcg/act inhaler, Inhale 2 puffs every 4 (four) hours as needed (Patient not taking: Reported on 10/6/2023), Disp: , Rfl:   •  meclizine (ANTIVERT) 12.5 MG tablet, Take 1 tablet (12.5 mg total) by mouth every 8 (eight) hours as needed for dizziness (Patient not taking: Reported on 10/6/2023), Disp: 30 tablet, Rfl: 0  •  olopatadine (PATANOL) 0.1 % ophthalmic solution, olopatadine 0.1 % eye drops (Patient not taking: Reported on 10/6/2023), Disp: , Rfl:   •  Salicylic Acid 6 % CREA, As needed (Patient not taking: Reported on 10/6/2023), Disp: , Rfl:   •  simethicone (MYLICON) 80 mg chewable tablet, Chew every 8 (eight) hours (Patient not taking: Reported on 8/25/2023), Disp: , Rfl:   •  urea (CARMOL) 40 %, if needed (Patient not taking: Reported on 10/6/2023), Disp: , Rfl:      Past Medical History:   Diagnosis Date   • Allergic rhinitis    • Asthma    • Disease of thyroid gland     Hashimoto's   • Fibroid    • Hiatal hernia    • Hypertension    • Hypothyroidism    • Primary osteoarthritis of knee     Left - last assessed: Aug 23, 2017   • Seasonal allergies    • Varicella          Past Surgical History:   Procedure Laterality Date   • APPENDECTOMY     • COLONOSCOPY  06/30/2014   • DIAGNOSTIC LAPAROSCOPY     • HAND SURGERY Left    • HYSTERECTOMY  06/28/2021    cervix intact   • MAMMO (HISTORICAL)  11/26/2016   • OOPHORECTOMY Bilateral 06/28/2021   • THYROIDECTOMY  1998    partial         Allergies   Allergen Reactions   • Other Allergic Rhinitis     Cats, dogs, grass, mildew, dust, mold   • Pollen Extract-Tree Extract [Pollen Extract] Allergic Rhinitis       No results found for this or any previous visit (from the past 1008 hour(s)). The following portions of the patient's history were reviewed and updated as appropriate: allergies, current medications, past family history, past medical history, past social history, past surgical history and problem list.     Review of Systems   Constitutional: Negative for appetite change, chills, diaphoresis, fatigue, fever and unexpected weight change. Respiratory: Negative for apnea, cough, choking, chest tightness, shortness of breath, wheezing and stridor. Cardiovascular: Negative for chest pain, palpitations and leg swelling. Gastrointestinal: Negative for abdominal distention, abdominal pain, anal bleeding, blood in stool, constipation, diarrhea, nausea and vomiting. Genitourinary: Negative for decreased urine volume, difficulty urinating, frequency and urgency. Musculoskeletal: Negative for arthralgias, back pain and myalgias. Left elbow and shoulder pain   Neurological: Negative for dizziness, light-headedness, numbness and headaches. Objective:      Vitals:    10/06/23 1619   BP: 110/80   Pulse: 93   Temp: 99.5 °F (37.5 °C)   SpO2: 99%          Physical Exam  Vitals reviewed. Constitutional:       General: She is not in acute distress. Appearance: Normal appearance.  She is not ill-appearing, toxic-appearing or diaphoretic. HENT:      Mouth/Throat:      Mouth: Mucous membranes are moist.   Cardiovascular:      Rate and Rhythm: Normal rate and regular rhythm. Pulses: Normal pulses. Heart sounds: Normal heart sounds. No murmur heard. No friction rub. No gallop. Pulmonary:      Effort: Pulmonary effort is normal. No respiratory distress. Breath sounds: Normal breath sounds. No stridor. No wheezing, rhonchi or rales. Chest:      Chest wall: No tenderness. Musculoskeletal:      Right shoulder: Normal.      Left shoulder: Normal.      Right elbow: Normal.      Left elbow: Normal.      Right lower leg: No edema. Left lower leg: No edema. Skin:     General: Skin is warm and dry. Findings: No lesion or rash. Neurological:      Mental Status: She is alert.

## 2023-10-27 ENCOUNTER — TELEMEDICINE (OUTPATIENT)
Dept: INTERNAL MEDICINE CLINIC | Facility: CLINIC | Age: 63
End: 2023-10-27
Payer: COMMERCIAL

## 2023-10-27 VITALS — BODY MASS INDEX: 30.12 KG/M2 | WEIGHT: 170 LBS | HEIGHT: 63 IN

## 2023-10-27 DIAGNOSIS — J01.00 ACUTE NON-RECURRENT MAXILLARY SINUSITIS: Primary | ICD-10-CM

## 2023-10-27 PROCEDURE — 99213 OFFICE O/P EST LOW 20 MIN: CPT | Performed by: INTERNAL MEDICINE

## 2023-10-27 RX ORDER — AMOXICILLIN AND CLAVULANATE POTASSIUM 875; 125 MG/1; MG/1
1 TABLET, FILM COATED ORAL EVERY 12 HOURS SCHEDULED
Qty: 14 TABLET | Refills: 0 | Status: SHIPPED | OUTPATIENT
Start: 2023-10-27 | End: 2023-11-03

## 2023-10-27 NOTE — PROGRESS NOTES
Virtual Regular Visit    Verification of patient location:    Patient is located at Home in the following state in which I hold an active license [place]      Assessment/Plan:    Problem List Items Addressed This Visit    None  Visit Diagnoses       Acute non-recurrent maxillary sinusitis    -  Primary    Nasal saline spray every 2 hours when awake advised as well as Augmentin. Relevant Medications    amoxicillin-clavulanate (AUGMENTIN) 875-125 mg per tablet    sodium chloride (OCEAN) 0.65 % nasal spray                 Reason for visit is   Chief Complaint   Patient presents with    Follow-up     Took covid test and was negative  Thick, yellow, green phlegm     Virtual Regular Visit          Encounter provider Cinthya Gomes MD    Provider located at [address] [place] [address]. [address]  [facility] [address] [phone]      Recent Visits  Date Type Provider Dept   10/27/23 Telemedicine Cinthya Gomes MD [address] [place] [address]   Showing recent visits within past 7 days and meeting all other requirements  Future Appointments  No visits were found meeting these conditions. Showing future appointments within next 150 days and meeting all other requirements       The patient was identified by name and date of birth. Dalton Moctezuma was informed that this is a telemedicine visit and that the visit is being conducted through the [employer]. She agrees to proceed. .  My office door was closed. No one else was in the room. She acknowledged consent and understanding of privacy and security of the video platform. The patient has agreed to participate and understands they can discontinue the visit at any time. Patient is aware this is a billable service. Dutch Trevino is a 58 y.o. female with acute sinus infection. Leandra Inch       HPI     Past Medical History:   Diagnosis Date    Allergic rhinitis     Asthma     Disease of thyroid gland Hashimoto's    Fibroid     Hiatal hernia     Hypertension     Hypothyroidism     Primary osteoarthritis of knee     Left - last assessed: Aug 23, 2017    Seasonal allergies     Varicella        Past Surgical History:   Procedure Laterality Date    APPENDECTOMY      COLONOSCOPY  06/30/2014    DIAGNOSTIC LAPAROSCOPY      HAND SURGERY Left     HYSTERECTOMY  06/28/2021    cervix intact    MAMMO (HISTORICAL)  11/26/2016    OOPHORECTOMY Bilateral 06/28/2021    THYROIDECTOMY  1998    partial       Current Outpatient Medications   Medication Sig Dispense Refill    acetaminophen (TYLENOL) 500 mg tablet Take 500 mg by mouth every 6 (six) hours as needed      albuterol (PROVENTIL HFA,VENTOLIN HFA) 90 mcg/act inhaler Inhale 1 puff every 4 (four) hours as needed      ALPRAZolam (XANAX) 0.25 mg tablet once as needed  2    amoxicillin-clavulanate (AUGMENTIN) 875-125 mg per tablet Take 1 tablet by mouth every 12 (twelve) hours for 7 days 14 tablet 0    Cholecalciferol (VITAMIN D-3) 5000 units TABS       diclofenac sodium (VOLTAREN) 1 % if needed      EPINEPHrine (EPIPEN) 0.3 mg/0.3 mL SOAJ       estradiol (ESTRACE) 0.1 mg/g vaginal cream As needed      Fexofenadine HCl (ALLEGRA PO) Take 1 tablet by mouth in the morning      levalbuterol (XOPENEX HFA) 45 mcg/act inhaler Inhale 2 puffs every 4 (four) hours as needed      losartan (COZAAR) 50 mg tablet Take 1 tablet (50 mg total) by mouth daily 90 tablet 3    meclizine (ANTIVERT) 12.5 MG tablet Take 1 tablet (12.5 mg total) by mouth every 8 (eight) hours as needed for dizziness 30 tablet 0    mometasone (NASONEX) 50 mcg/act nasal spray mometasone 50 mcg/actuation nasal spray      mometasone-formoterol (Dulera) 100-5 MCG/ACT inhaler Inhale 2 puffs 2 (two) times a day Rinse mouth after use.       ondansetron (ZOFRAN) 4 mg tablet Take 1 tablet (4 mg total) by mouth every 8 (eight) hours as needed for nausea or vomiting 20 tablet 0    pantoprazole (PROTONIX) 40 mg tablet Take 40 mg by mouth daily      PreviDent 5000 Booster Plus 1.1 % PSTE       Restasis 0.05 % ophthalmic emulsion Administer 1 drop to both eyes every 12 (twelve) hours      Salicylic Acid 6 % CREA As needed      sodium chloride (OCEAN) 0.65 % nasal spray 1 spray into each nostril every 2 (two) hours while awake 60 mL 1    SUMAtriptan (IMITREX) 50 mg tablet Take 1 tablet (50 mg total) by mouth once as needed for migraine for up to 1 dose 9 tablet 0    Synthroid 100 MCG tablet Take 100 mcg by mouth every other day      Synthroid 112 MCG tablet Take 112 mcg by mouth every other day      urea (CARMOL) 40 % if needed      Calcium Ascorbate 500 MG TABS TAKE 1 TABLET DAILY FOR 50 DAYS (Patient not taking: Reported on 6/26/2023)      olopatadine (PATANOL) 0.1 % ophthalmic solution olopatadine 0.1 % eye drops (Patient not taking: Reported on 10/6/2023)      ORACEA 40 MG capsule Take 40 mg by mouth daily (Patient not taking: Reported on 10/27/2023)  3    simethicone (MYLICON) 80 mg chewable tablet Chew every 8 (eight) hours (Patient not taking: Reported on 8/25/2023)       No current facility-administered medications for this visit. Allergies   Allergen Reactions    Other Allergic Rhinitis     Cats, dogs, grass, mildew, dust, mold    Pollen Extract-Tree Extract [Pollen Extract] Allergic Rhinitis       Review of Systems   Constitutional:  Negative for appetite change, chills, fatigue and fever. HENT:  Positive for congestion. Negative for sore throat and trouble swallowing. Eyes:  Negative for redness. Respiratory:  Positive for cough. Negative for shortness of breath. Cardiovascular:  Negative for chest pain and palpitations. Gastrointestinal:  Negative for abdominal pain, constipation and diarrhea. Genitourinary:  Negative for dysuria and hematuria. Musculoskeletal:  Negative for back pain and neck pain. Skin:  Negative for rash. Neurological:  Negative for seizures, weakness and headaches.    Hematological: Negative for adenopathy. Psychiatric/Behavioral:  Negative for confusion. The patient is not nervous/anxious. Video Exam    Vitals:    10/27/23 1343   Weight: 77.1 kg (170 lb)   Height: 5' 3" (1.6 m)       Physical Exam  Constitutional:       Appearance: Normal appearance. She is normal weight. HENT:      Head: Normocephalic and atraumatic. Nose: Nose normal.      Mouth/Throat:      Mouth: Mucous membranes are moist.   Eyes:      Extraocular Movements: Extraocular movements intact. Pupils: Pupils are equal, round, and reactive to light. Pulmonary:      Effort: Pulmonary effort is normal.   Abdominal:      Palpations: Abdomen is soft. Musculoskeletal:         General: Normal range of motion. Cervical back: Normal range of motion and neck supple. Neurological:      General: No focal deficit present. Mental Status: She is alert and oriented to person, place, and time. Mental status is at baseline.           Visit Time  Total Visit Duration: 25

## 2023-11-08 ENCOUNTER — APPOINTMENT (OUTPATIENT)
Dept: LAB | Facility: AMBULARY SURGERY CENTER | Age: 63
End: 2023-11-08
Payer: COMMERCIAL

## 2023-11-08 DIAGNOSIS — E78.5 HYPERLIPIDEMIA, UNSPECIFIED HYPERLIPIDEMIA TYPE: ICD-10-CM

## 2023-11-08 DIAGNOSIS — E34.9 ENDOCRINE DISORDER RELATED TO PUBERTY: ICD-10-CM

## 2023-11-08 DIAGNOSIS — E06.3 CHRONIC LYMPHOCYTIC THYROIDITIS: ICD-10-CM

## 2023-11-08 DIAGNOSIS — E89.0 POSTSURGICAL HYPOTHYROIDISM: ICD-10-CM

## 2023-11-08 LAB
ALBUMIN SERPL BCP-MCNC: 4.1 G/DL (ref 3.5–5)
ALP SERPL-CCNC: 89 U/L (ref 34–104)
ALT SERPL W P-5'-P-CCNC: 14 U/L (ref 7–52)
ANION GAP SERPL CALCULATED.3IONS-SCNC: 4 MMOL/L
AST SERPL W P-5'-P-CCNC: 16 U/L (ref 13–39)
BASOPHILS # BLD AUTO: 0.04 THOUSANDS/ÂΜL (ref 0–0.1)
BASOPHILS NFR BLD AUTO: 1 % (ref 0–1)
BILIRUB SERPL-MCNC: 0.54 MG/DL (ref 0.2–1)
BILIRUB UR QL STRIP: NEGATIVE
BUN SERPL-MCNC: 18 MG/DL (ref 5–25)
CALCIUM SERPL-MCNC: 8.6 MG/DL (ref 8.4–10.2)
CHLORIDE SERPL-SCNC: 104 MMOL/L (ref 96–108)
CLARITY UR: CLEAR
CO2 SERPL-SCNC: 31 MMOL/L (ref 21–32)
COLOR UR: COLORLESS
CREAT SERPL-MCNC: 0.64 MG/DL (ref 0.6–1.3)
CREAT UR-MCNC: 33.1 MG/DL
EOSINOPHIL # BLD AUTO: 0.13 THOUSAND/ÂΜL (ref 0–0.61)
EOSINOPHIL NFR BLD AUTO: 4 % (ref 0–6)
ERYTHROCYTE [DISTWIDTH] IN BLOOD BY AUTOMATED COUNT: 13.1 % (ref 11.6–15.1)
EST. AVERAGE GLUCOSE BLD GHB EST-MCNC: 123 MG/DL
GFR SERPL CREATININE-BSD FRML MDRD: 94 ML/MIN/1.73SQ M
GLUCOSE P FAST SERPL-MCNC: 84 MG/DL (ref 65–99)
GLUCOSE UR STRIP-MCNC: NEGATIVE MG/DL
HBA1C MFR BLD: 5.9 %
HCT VFR BLD AUTO: 40.5 % (ref 34.8–46.1)
HGB BLD-MCNC: 13.3 G/DL (ref 11.5–15.4)
HGB UR QL STRIP.AUTO: NEGATIVE
IMM GRANULOCYTES # BLD AUTO: 0.01 THOUSAND/UL (ref 0–0.2)
IMM GRANULOCYTES NFR BLD AUTO: 0 % (ref 0–2)
KETONES UR STRIP-MCNC: NEGATIVE MG/DL
LEUKOCYTE ESTERASE UR QL STRIP: NEGATIVE
LYMPHOCYTES # BLD AUTO: 1.48 THOUSANDS/ÂΜL (ref 0.6–4.47)
LYMPHOCYTES NFR BLD AUTO: 41 % (ref 14–44)
MAGNESIUM SERPL-MCNC: 2 MG/DL (ref 1.9–2.7)
MCH RBC QN AUTO: 30.1 PG (ref 26.8–34.3)
MCHC RBC AUTO-ENTMCNC: 32.8 G/DL (ref 31.4–37.4)
MCV RBC AUTO: 92 FL (ref 82–98)
MICROALBUMIN UR-MCNC: <7 MG/L
MICROALBUMIN/CREAT 24H UR: <21 MG/G CREATININE (ref 0–30)
MONOCYTES # BLD AUTO: 0.48 THOUSAND/ÂΜL (ref 0.17–1.22)
MONOCYTES NFR BLD AUTO: 14 % (ref 4–12)
NEUTROPHILS # BLD AUTO: 1.4 THOUSANDS/ÂΜL (ref 1.85–7.62)
NEUTS SEG NFR BLD AUTO: 40 % (ref 43–75)
NITRITE UR QL STRIP: NEGATIVE
NRBC BLD AUTO-RTO: 0 /100 WBCS
PH UR STRIP.AUTO: 7 [PH]
PHOSPHATE SERPL-MCNC: 3.3 MG/DL (ref 2.3–4.1)
PLATELET # BLD AUTO: 267 THOUSANDS/UL (ref 149–390)
PMV BLD AUTO: 10.9 FL (ref 8.9–12.7)
POTASSIUM SERPL-SCNC: 4.1 MMOL/L (ref 3.5–5.3)
PROT SERPL-MCNC: 6.5 G/DL (ref 6.4–8.4)
PROT UR STRIP-MCNC: NEGATIVE MG/DL
RBC # BLD AUTO: 4.42 MILLION/UL (ref 3.81–5.12)
SODIUM SERPL-SCNC: 139 MMOL/L (ref 135–147)
SP GR UR STRIP.AUTO: 1.01 (ref 1–1.03)
T4 FREE SERPL-MCNC: 0.89 NG/DL (ref 0.61–1.12)
TSH SERPL DL<=0.05 MIU/L-ACNC: 0.68 UIU/ML (ref 0.45–4.5)
UROBILINOGEN UR STRIP-ACNC: <2 MG/DL
WBC # BLD AUTO: 3.54 THOUSAND/UL (ref 4.31–10.16)

## 2023-11-08 PROCEDURE — 36415 COLL VENOUS BLD VENIPUNCTURE: CPT

## 2023-11-08 PROCEDURE — 82043 UR ALBUMIN QUANTITATIVE: CPT

## 2023-11-08 PROCEDURE — 82570 ASSAY OF URINE CREATININE: CPT

## 2023-11-08 PROCEDURE — 84439 ASSAY OF FREE THYROXINE: CPT

## 2023-11-08 PROCEDURE — 80053 COMPREHEN METABOLIC PANEL: CPT

## 2023-11-08 PROCEDURE — 85025 COMPLETE CBC W/AUTO DIFF WBC: CPT

## 2023-11-08 PROCEDURE — 84443 ASSAY THYROID STIM HORMONE: CPT

## 2023-11-08 PROCEDURE — 83036 HEMOGLOBIN GLYCOSYLATED A1C: CPT

## 2023-11-08 PROCEDURE — 82088 ASSAY OF ALDOSTERONE: CPT

## 2023-11-08 PROCEDURE — 84100 ASSAY OF PHOSPHORUS: CPT

## 2023-11-08 PROCEDURE — 83735 ASSAY OF MAGNESIUM: CPT

## 2023-11-08 PROCEDURE — 84244 ASSAY OF RENIN: CPT

## 2023-11-08 PROCEDURE — 81003 URINALYSIS AUTO W/O SCOPE: CPT

## 2023-11-15 ENCOUNTER — VBI (OUTPATIENT)
Dept: ADMINISTRATIVE | Facility: OTHER | Age: 63
End: 2023-11-15

## 2023-11-21 ENCOUNTER — HOSPITAL ENCOUNTER (OUTPATIENT)
Dept: ULTRASOUND IMAGING | Facility: HOSPITAL | Age: 63
Discharge: HOME | End: 2023-11-21
Attending: SPECIALIST
Payer: COMMERCIAL

## 2023-11-21 DIAGNOSIS — E78.5 HYPERLIPIDEMIA, UNSPECIFIED: ICD-10-CM

## 2023-11-21 DIAGNOSIS — E89.0 POSTPROCEDURAL HYPOTHYROIDISM: ICD-10-CM

## 2023-11-21 DIAGNOSIS — E06.3 AUTOIMMUNE THYROIDITIS: ICD-10-CM

## 2023-11-21 PROCEDURE — 76700 US EXAM ABDOM COMPLETE: CPT

## 2023-11-22 LAB
ALDOST SERPL-MCNC: 4.5 NG/DL (ref 0–30)
ALDOST/RENIN PLAS-RTO: 14.7 {RATIO} (ref 0–30)
RENIN PLAS-CCNC: 0.31 NG/ML/HR (ref 0.17–5.38)

## 2023-12-04 ENCOUNTER — TELEPHONE (OUTPATIENT)
Dept: INTERNAL MEDICINE CLINIC | Facility: CLINIC | Age: 63
End: 2023-12-04

## 2023-12-04 NOTE — TELEPHONE ENCOUNTER
I'm sick and I was wondering. I have hypertension and I have a lot of sinus pressure. If I'm allowed to take Sudafed, if you could call me back at [phone]. Thank you.

## 2023-12-04 NOTE — TELEPHONE ENCOUNTER
Patient called and wanted to know if she is mary ann to take sudafed , she has high blood pressure dr Anamaria Maria is not her today and she would like to take  today if possible .  I will call her back to let her know

## 2023-12-07 ENCOUNTER — TELEMEDICINE (OUTPATIENT)
Dept: INTERNAL MEDICINE CLINIC | Facility: CLINIC | Age: 63
End: 2023-12-07
Payer: COMMERCIAL

## 2023-12-07 VITALS
DIASTOLIC BLOOD PRESSURE: 89 MMHG | HEIGHT: 63 IN | SYSTOLIC BLOOD PRESSURE: 127 MMHG | BODY MASS INDEX: 30.12 KG/M2 | HEART RATE: 81 BPM | WEIGHT: 170 LBS

## 2023-12-07 DIAGNOSIS — U07.1 COVID-19: Primary | ICD-10-CM

## 2023-12-07 DIAGNOSIS — D70.4 CYCLICAL NEUTROPENIA (HCC): ICD-10-CM

## 2023-12-07 DIAGNOSIS — E53.8 B12 DEFICIENCY: ICD-10-CM

## 2023-12-07 DIAGNOSIS — E53.8 FOLATE DEFICIENCY: ICD-10-CM

## 2023-12-07 PROCEDURE — 99213 OFFICE O/P EST LOW 20 MIN: CPT | Performed by: INTERNAL MEDICINE

## 2023-12-07 NOTE — LETTER
[address] [facility] INTERNAL MEDICINE  [address] [place] [address]  [phone]  Dept: [phone]    December 7, 2023    Patient: Miguel Justice  YOB: 1960    Miguel Justice was seen and evaluated at our [facility]. Please note if Covid and Flu tests are negative, they may return to work when fever free for 24 hours without the use of a fever reducing agent. If Covid or Flu test is positive, they may return to work on 12/11/2023, as this is 5 days from the onset of symptoms. Upon return, they must then adhere to strict masking for an additional 5 days.     Sincerely,    Johnny Draper MD

## 2023-12-07 NOTE — PROGRESS NOTES
COVID-19 Outpatient Progress Note    Assessment/Plan:    Problem List Items Addressed This Visit       Neutropenia, unspecified ([address])    Relevant Orders    Vitamin B12    Folate    Methylmalonic acid, serum    CBC and differential     Other Visit Diagnoses       COVID-19    -  Primary    Relevant Orders    Vitamin B12    Folate    Methylmalonic acid, serum    CBC and differential    B12 deficiency        Relevant Orders    Vitamin B12    Folate    Methylmalonic acid, serum    CBC and differential    Folate deficiency        Relevant Orders    Vitamin B12    Folate    Methylmalonic acid, serum    CBC and differential           Disposition:     Discussed symptom directed medication options with patient. Discussed vitamin D, vitamin C, and/or zinc supplementation with patient. Patient's reports developing symptoms later Sunday night, symptoms are now improving although she has a lot of fatigue, metallic taste and poor appetite. Recommend plenty of hydration, nutrition and rest.  Patient previously was intolerant of Paxlovid in June 2023. Wants to defer Paxlovid medication at this time since she has symptomatic improvement. Requires work note. She is also concerned about her cyclical neutropenia. Blood work since the 2021 reviewed and discussed with the patient. She was earlier evaluated by hematologist in 2021 and was recommended to follow-up with pediatric her labs. Will check for B12 folate deficiencies. Follow-up with blood work in January    I have spent a total time of 15 minutes on the day of the encounter for this patient including discussing diagnostic results, discussing prognosis, risks and benefits of treatment options, instructions for management, patient and family education, risk factor reductions, counseling/coordination of care and reviewing/ordering tests, medicine, procedures.        Encounter provider: Supriya Torres MD     Provider located at: [address] [place] [address] [address] [facility]  [address] [facility] [id number] [phone]     Recent Visits  Date Type Provider Dept   12/04/23 Telephone Henrry Gonzalez MD [facility]   12/04/23 Telephone Elizabeth De Santiago MD [address] [place] [address] [facility]   Showing recent visits within past 7 days and meeting all other requirements  Today's Visits  Date Type Provider Dept   12/07/23 Telemedicine Henrry [facility]   Showing today's visits and meeting all other requirements  Future Appointments  No visits were found meeting these conditions. Showing future appointments within next 150 days and meeting all other requirements     This virtual check-in was done via [facility] and patient was informed that this is a secure, HIPAA-compliant platform. She agrees to proceed. Patient agrees to participate in a virtual check in via telephone or video visit instead of presenting to the office to address urgent/immediate medical needs. Patient is aware this is a billable service. She acknowledged consent and understanding of privacy and security of the video platform. The patient has agreed to participate and understands they can discontinue the visit at any time. After connecting through [facility], the patient was identified by name and date of birth. Reagan Ramirez was informed that this was a telemedicine visit and that the exam was being conducted confidentially over secure lines. My office door was closed. No one else was in the room. Reagan Ramirez acknowledged consent and understanding of privacy and security of the telemedicine visit. I informed the patient that I have reviewed her record in Epic and presented the opportunity for her to ask any questions regarding the visit today. The patient agreed to participate.      Verification of patient location:  Patient is located in the following state in which I hold an active license: [place]    Subjective:   Miguel Justice is a 58 y.o. female who is concerned about COVID-19. Patient's symptoms include fatigue, anosmia, loss of taste, myalgias and headache. Patient denies fever, chills, congestion, rhinorrhea, sore throat, cough, shortness of breath, chest tightness, abdominal pain, nausea, vomiting and diarrhea. - Date of symptom onset: 12/4/2023      COVID-19 vaccination status: Fully vaccinated with booster    Lab Results   Component Value Date    SARSCOV2 Negative 12/22/2022    [address] [facility] [facility] Not Detected 06/23/2021    SARSCOVAG Positive (A) 06/13/2023       Review of Systems   Constitutional:  Positive for fatigue. Negative for activity change, appetite change, chills, diaphoresis, fever and unexpected weight change. HENT:  Negative for congestion, drooling, ear discharge, ear pain, facial swelling, hearing loss, postnasal drip, rhinorrhea, sinus pressure, sinus pain, sneezing, sore throat, tinnitus, trouble swallowing and voice change. Eyes:  Negative for discharge. Respiratory:  Negative for apnea, cough, choking, chest tightness, shortness of breath, wheezing and stridor. Cardiovascular:  Negative for chest pain, palpitations and leg swelling. Gastrointestinal:  Negative for abdominal distention, abdominal pain, anal bleeding, blood in stool, constipation, diarrhea, nausea and vomiting. Genitourinary:  Negative for decreased urine volume, difficulty urinating, frequency and urgency. Musculoskeletal:  Positive for myalgias. Negative for arthralgias and back pain. Skin:  Negative for color change. Neurological:  Positive for headaches. Negative for dizziness, light-headedness and numbness.      Current Outpatient Medications on File Prior to Visit   Medication Sig    albuterol (PROVENTIL HFA,VENTOLIN HFA) 90 mcg/act inhaler Inhale 1 puff every 4 (four) hours as needed    ALPRAZolam (XANAX) 0.25 mg tablet once as needed    Cholecalciferol (VITAMIN D-3) 5000 units TABS diclofenac sodium (VOLTAREN) 1 % if needed    EPINEPHrine (EPIPEN) 0.3 mg/0.3 mL SOAJ     estradiol (ESTRACE) 0.1 mg/g vaginal cream As needed    Fexofenadine HCl (ALLEGRA PO) Take 1 tablet by mouth in the morning    levalbuterol (XOPENEX HFA) 45 mcg/act inhaler Inhale 2 puffs every 4 (four) hours as needed    losartan (COZAAR) 50 mg tablet Take 1 tablet (50 mg total) by mouth daily    meclizine (ANTIVERT) 12.5 MG tablet Take 1 tablet (12.5 mg total) by mouth every 8 (eight) hours as needed for dizziness    mometasone (NASONEX) 50 mcg/act nasal spray mometasone 50 mcg/actuation nasal spray    mometasone-formoterol (Dulera) 100-5 MCG/ACT inhaler Inhale 2 puffs 2 (two) times a day Rinse mouth after use.     ondansetron (ZOFRAN) 4 mg tablet Take 1 tablet (4 mg total) by mouth every 8 (eight) hours as needed for nausea or vomiting    pantoprazole (PROTONIX) 40 mg tablet Take 40 mg by mouth daily    PreviDent 5000 Booster Plus 1.1 % PSTE     Restasis 0.05 % ophthalmic emulsion Administer 1 drop to both eyes every 12 (twelve) hours    Salicylic Acid 6 % CREA As needed    sodium chloride (OCEAN) 0.65 % nasal spray 1 spray into each nostril every 2 (two) hours while awake    SUMAtriptan (IMITREX) 50 mg tablet Take 1 tablet (50 mg total) by mouth once as needed for migraine for up to 1 dose    Synthroid 100 MCG tablet Take 100 mcg by mouth every other day    Synthroid 112 MCG tablet Take 112 mcg by mouth every other day    urea (CARMOL) 40 % if needed    acetaminophen (TYLENOL) 500 mg tablet Take 500 mg by mouth every 6 (six) hours as needed (Patient not taking: Reported on 12/7/2023)    Calcium Ascorbate 500 MG TABS TAKE 1 TABLET DAILY FOR 50 DAYS (Patient not taking: Reported on 6/26/2023)    olopatadine (PATANOL) 0.1 % ophthalmic solution olopatadine 0.1 % eye drops (Patient not taking: Reported on 10/6/2023)    ORACEA 40 MG capsule Take 40 mg by mouth daily (Patient not taking: Reported on 10/27/2023)    simethicone (MYLICON) 80 mg chewable tablet Chew every 8 (eight) hours (Patient not taking: Reported on 8/25/2023)       Objective:    /89 (BP Location: Left arm, Patient Position: Sitting, Cuff Size: Standard)   Pulse 81   Ht 5' 3" (1.6 m)   Wt 77.1 kg (170 lb)   LMP  (LMP Unknown)   BMI 30.11 kg/m²        Physical Exam  Constitutional:       General: She is not in acute distress. Appearance: Normal appearance. She is not ill-appearing, toxic-appearing or diaphoretic. Neurological:      Mental Status: She is alert and oriented to person, place, and time.        Supriya Torres MD

## 2023-12-13 ENCOUNTER — HOSPITAL ENCOUNTER (OUTPATIENT)
Dept: RADIOLOGY | Age: 63
Discharge: HOME | End: 2023-12-13
Attending: NURSE PRACTITIONER
Payer: COMMERCIAL

## 2023-12-13 VITALS — HEIGHT: 63 IN | WEIGHT: 170 LBS | BODY MASS INDEX: 30.12 KG/M2

## 2023-12-13 DIAGNOSIS — Z12.31 ENCOUNTER FOR SCREENING MAMMOGRAM FOR MALIGNANT NEOPLASM OF BREAST: ICD-10-CM

## 2023-12-13 PROCEDURE — 77063 BREAST TOMOSYNTHESIS BI: CPT

## 2023-12-13 PROCEDURE — 77067 SCR MAMMO BI INCL CAD: CPT

## 2023-12-15 NOTE — PROGRESS NOTES
Daily Note     Today's date: 2018  Patient name: Janel Gilmore  : 1960  MRN: [id number]  Referring provider: Marilu Conroy MD  Dx:   Encounter Diagnosis     ICD-10-CM    1  Chronic pain of left knee M25 562     G89 29      1:1 with PTA CR 10:00- 10:30  Subjective: Patient arrived 15 minutes late but was accommodated  States that she does have some point tenderness following injection yesterday rated 1/10  Reports that Dr Carmine Blair would like her to hold on performing squats to avoid aggravating her knee  Objective: See treatment diary below  Manual                                                                              Exercise Diary                 bike 8'  10'  10'  10'               Mini squats- pain free 2x10  2x10  2x15  HOLD               Step ups fwd/lat 2x15 6"  2x15 6'' ea  2x15 8" ea  8"   2x15 ea                Total gym lat squats 2x15  2x15 L7  2x20 L7  HOLD               rockerboard ap/ml 20ea  20 ea  20 ea  20ea/30"               HR/TR 30ea  30 ea  30 ea  30 ea                Bridges w/ tb 2x10 OTB  2x10 OTB  2x15 otb  OTB  3" 2x15               SLR flex/abd 2x10  2x10  2x15  2x15 ea                clamshells 2x10 ea  2x10 ea  2x15 ea  2x15 ea                                                                                                                                                                                                                                                                                              Modalities                        CP prn                                                                             Assessment: Tolerated treatment well  Patient exhibited good technique with therapeutic exercises  Held squatting activities per MD  No complaints of increased pain with current POC  Challenged with addition of balance on the rockerboard  Plan: Continue per plan of care  NSTEMI (non-ST elevation myocardial infarction) NSTEMI (non-ST elevation myocardial infarction)

## 2024-02-19 DIAGNOSIS — I10 ESSENTIAL HYPERTENSION: ICD-10-CM

## 2024-02-19 RX ORDER — LOSARTAN POTASSIUM 50 MG/1
50 TABLET ORAL DAILY
Qty: 90 TABLET | Refills: 3 | Status: SHIPPED | OUTPATIENT
Start: 2024-02-19

## 2024-03-01 ENCOUNTER — RA CDI HCC (OUTPATIENT)
Dept: OTHER | Facility: HOSPITAL | Age: 64
End: 2024-03-01

## 2024-03-05 ENCOUNTER — OFFICE VISIT (OUTPATIENT)
Dept: INTERNAL MEDICINE CLINIC | Facility: CLINIC | Age: 64
End: 2024-03-05
Payer: COMMERCIAL

## 2024-03-05 VITALS
HEART RATE: 83 BPM | OXYGEN SATURATION: 98 % | HEIGHT: 63 IN | TEMPERATURE: 97.8 F | BODY MASS INDEX: 31.18 KG/M2 | SYSTOLIC BLOOD PRESSURE: 142 MMHG | WEIGHT: 176 LBS | DIASTOLIC BLOOD PRESSURE: 98 MMHG

## 2024-03-05 DIAGNOSIS — E66.9 OBESITY (BMI 30-39.9): ICD-10-CM

## 2024-03-05 DIAGNOSIS — I10 PRIMARY HYPERTENSION: ICD-10-CM

## 2024-03-05 DIAGNOSIS — E26.9 HYPERALDOSTERONISM (HCC): ICD-10-CM

## 2024-03-05 DIAGNOSIS — E03.8 HYPOTHYROIDISM DUE TO HASHIMOTO'S THYROIDITIS: Primary | ICD-10-CM

## 2024-03-05 DIAGNOSIS — E06.3 HYPOTHYROIDISM DUE TO HASHIMOTO'S THYROIDITIS: Primary | ICD-10-CM

## 2024-03-05 PROCEDURE — 99214 OFFICE O/P EST MOD 30 MIN: CPT | Performed by: INTERNAL MEDICINE

## 2024-03-05 RX ORDER — AMLODIPINE BESYLATE 2.5 MG/1
2.5 TABLET ORAL DAILY
Qty: 90 TABLET | Refills: 0 | Status: SHIPPED | OUTPATIENT
Start: 2024-03-05 | End: 2024-06-03

## 2024-03-05 NOTE — PROGRESS NOTES
Assessment/Plan:    Diagnoses and all orders for this visit:    Hypothyroidism due to Hashimoto's thyroiditis  -     CBC and differential; Future  -     Comprehensive metabolic panel; Future  -     Lipid panel; Future  -     TSH, 3rd generation with Free T4 reflex; Future    Primary hypertension  -     amLODIPine (NORVASC) 2.5 mg tablet; Take 1 tablet (2.5 mg total) by mouth daily  -     CBC and differential; Future  -     Comprehensive metabolic panel; Future  -     Lipid panel; Future  -     TSH, 3rd generation with Free T4 reflex; Future    Hyperaldosteronism (HCC)  -     CBC and differential; Future  -     Comprehensive metabolic panel; Future  -     Lipid panel; Future  -     TSH, 3rd generation with Free T4 reflex; Future    Obesity (BMI 30-39.9)  -     CBC and differential; Future  -     Comprehensive metabolic panel; Future  -     Lipid panel; Future  -     TSH, 3rd generation with Free T4 reflex; Future              There are no Patient Instructions on file for this visit.    Subjective:      Patient ID: Autumn Calloway is a 63 y.o. female    HPI      Current Outpatient Medications:     amLODIPine (NORVASC) 2.5 mg tablet, Take 1 tablet (2.5 mg total) by mouth daily, Disp: 90 tablet, Rfl: 0    Cholecalciferol (VITAMIN D-3) 5000 units TABS, , Disp: , Rfl:     diclofenac sodium (VOLTAREN) 1 %, if needed, Disp: , Rfl:     EPINEPHrine (EPIPEN) 0.3 mg/0.3 mL SOAJ, , Disp: , Rfl:     estradiol (ESTRACE) 0.1 mg/g vaginal cream, As needed, Disp: , Rfl:     Fexofenadine HCl (ALLEGRA PO), Take 1 tablet by mouth in the morning, Disp: , Rfl:     levalbuterol (XOPENEX HFA) 45 mcg/act inhaler, Inhale 2 puffs every 4 (four) hours as needed, Disp: , Rfl:     losartan (COZAAR) 50 mg tablet, Take 1 tablet (50 mg total) by mouth daily, Disp: 90 tablet, Rfl: 3    mometasone (NASONEX) 50 mcg/act nasal spray, mometasone 50 mcg/actuation nasal spray, Disp: , Rfl:     mometasone-formoterol (Dulera) 100-5 MCG/ACT inhaler, Inhale 2  puffs 2 (two) times a day Rinse mouth after use., Disp: , Rfl:     ondansetron (ZOFRAN) 4 mg tablet, Take 1 tablet (4 mg total) by mouth every 8 (eight) hours as needed for nausea or vomiting, Disp: 20 tablet, Rfl: 0    pantoprazole (PROTONIX) 40 mg tablet, Take 40 mg by mouth daily, Disp: , Rfl:     PreviDent 5000 Booster Plus 1.1 % PSTE, , Disp: , Rfl:     Restasis 0.05 % ophthalmic emulsion, Administer 1 drop to both eyes every 12 (twelve) hours, Disp: , Rfl:     Salicylic Acid 6 % CREA, As needed, Disp: , Rfl:     sodium chloride (OCEAN) 0.65 % nasal spray, 1 spray into each nostril every 2 (two) hours while awake, Disp: 60 mL, Rfl: 1    SUMAtriptan (IMITREX) 50 mg tablet, Take 1 tablet (50 mg total) by mouth once as needed for migraine for up to 1 dose, Disp: 9 tablet, Rfl: 0    Synthroid 100 MCG tablet, Take 100 mcg by mouth every other day, Disp: , Rfl:     Synthroid 112 MCG tablet, Take 112 mcg by mouth every other day, Disp: , Rfl:     urea (CARMOL) 40 %, if needed, Disp: , Rfl:     ALPRAZolam (XANAX) 0.25 mg tablet, once as needed (Patient not taking: Reported on 3/5/2024), Disp: , Rfl: 2    simethicone (MYLICON) 80 mg chewable tablet, Chew every 8 (eight) hours (Patient not taking: Reported on 8/25/2023), Disp: , Rfl:      Past Medical History:   Diagnosis Date    Allergic rhinitis     Asthma     Disease of thyroid gland     Hashimoto's    Fibroid     Hiatal hernia     Hypertension     Hypothyroidism     Primary osteoarthritis of knee     Left - last assessed: Aug 23, 2017    Seasonal allergies     Varicella          Past Surgical History:   Procedure Laterality Date    APPENDECTOMY      COLONOSCOPY  06/30/2014    DIAGNOSTIC LAPAROSCOPY      HAND SURGERY Left     HYSTERECTOMY  06/28/2021    cervix intact    MAMMO (HISTORICAL)  11/26/2016    OOPHORECTOMY Bilateral 06/28/2021    THYROIDECTOMY  1998    partial         Allergies   Allergen Reactions    Bee Pollen Itching    Dog Epithelium Allergy Skin Test  Allergic Rhinitis    Other Allergic Rhinitis     Cats, dogs, grass, mildew, dust, mold    Pollen Extract-Tree Extract [Pollen Extract] Allergic Rhinitis       No results found for this or any previous visit (from the past 1008 hour(s)).    The following portions of the patient's history were reviewed and updated as appropriate: allergies, current medications, past family history, past medical history, past social history, past surgical history and problem list.     Review of Systems   Constitutional:  Negative for appetite change, chills, diaphoresis, fatigue, fever and unexpected weight change.   Respiratory:  Negative for apnea, cough, choking, chest tightness, shortness of breath, wheezing and stridor.    Cardiovascular:  Negative for chest pain, palpitations and leg swelling.   Gastrointestinal:  Negative for abdominal distention, abdominal pain, anal bleeding, blood in stool, constipation, diarrhea, nausea and vomiting.   Genitourinary:  Negative for decreased urine volume, difficulty urinating, frequency and urgency.   Musculoskeletal:  Negative for arthralgias, back pain and myalgias.   Neurological:  Negative for dizziness, light-headedness, numbness and headaches.         Objective:      Vitals:    03/05/24 1645   BP: 142/98   Pulse: 83   Temp: 97.8 °F (36.6 °C)   SpO2: 98%          Physical Exam  Vitals reviewed.   Constitutional:       General: She is not in acute distress.     Appearance: Normal appearance. She is not ill-appearing, toxic-appearing or diaphoretic.   HENT:      Mouth/Throat:      Mouth: Mucous membranes are moist.   Cardiovascular:      Rate and Rhythm: Normal rate and regular rhythm.      Pulses: Normal pulses.      Heart sounds: Normal heart sounds. No murmur heard.     No friction rub. No gallop.   Pulmonary:      Effort: Pulmonary effort is normal. No respiratory distress.      Breath sounds: Normal breath sounds. No stridor. No wheezing, rhonchi or rales.   Chest:      Chest wall: No  tenderness.   Musculoskeletal:      Right lower leg: No edema.      Left lower leg: No edema.   Skin:     General: Skin is warm and dry.      Findings: No lesion or rash.   Neurological:      Mental Status: She is alert.

## 2024-05-17 ENCOUNTER — APPOINTMENT (OUTPATIENT)
Dept: LAB | Facility: AMBULARY SURGERY CENTER | Age: 64
End: 2024-05-17
Payer: COMMERCIAL

## 2024-05-17 DIAGNOSIS — E06.3 HYPOTHYROIDISM DUE TO HASHIMOTO'S THYROIDITIS: ICD-10-CM

## 2024-05-17 DIAGNOSIS — E55.9 VITAMIN D DEFICIENCY: ICD-10-CM

## 2024-05-17 DIAGNOSIS — E53.8 FOLATE DEFICIENCY: ICD-10-CM

## 2024-05-17 DIAGNOSIS — E53.8 B12 DEFICIENCY: ICD-10-CM

## 2024-05-17 DIAGNOSIS — I10 PRIMARY HYPERTENSION: ICD-10-CM

## 2024-05-17 DIAGNOSIS — D70.4 CYCLICAL NEUTROPENIA (HCC): ICD-10-CM

## 2024-05-17 DIAGNOSIS — E66.9 OBESITY (BMI 30-39.9): ICD-10-CM

## 2024-05-17 DIAGNOSIS — U07.1 COVID-19: ICD-10-CM

## 2024-05-17 DIAGNOSIS — E26.9 HYPERALDOSTERONISM (HCC): ICD-10-CM

## 2024-05-17 DIAGNOSIS — E53.8 VITAMIN B12 DEFICIENCY (NON ANEMIC): ICD-10-CM

## 2024-05-17 DIAGNOSIS — E03.8 HYPOTHYROIDISM DUE TO HASHIMOTO'S THYROIDITIS: ICD-10-CM

## 2024-05-17 LAB
ALBUMIN SERPL BCP-MCNC: 4.2 G/DL (ref 3.5–5)
ALP SERPL-CCNC: 87 U/L (ref 34–104)
ALT SERPL W P-5'-P-CCNC: 18 U/L (ref 7–52)
ANION GAP SERPL CALCULATED.3IONS-SCNC: 7 MMOL/L (ref 4–13)
AST SERPL W P-5'-P-CCNC: 16 U/L (ref 13–39)
BASOPHILS # BLD AUTO: 0.03 THOUSANDS/ÂΜL (ref 0–0.1)
BASOPHILS NFR BLD AUTO: 1 % (ref 0–1)
BILIRUB SERPL-MCNC: 0.35 MG/DL (ref 0.2–1)
BUN SERPL-MCNC: 19 MG/DL (ref 5–25)
CALCIUM SERPL-MCNC: 8.9 MG/DL (ref 8.4–10.2)
CHLORIDE SERPL-SCNC: 103 MMOL/L (ref 96–108)
CHOLEST SERPL-MCNC: 198 MG/DL
CO2 SERPL-SCNC: 28 MMOL/L (ref 21–32)
CREAT SERPL-MCNC: 0.68 MG/DL (ref 0.6–1.3)
EOSINOPHIL # BLD AUTO: 0.11 THOUSAND/ÂΜL (ref 0–0.61)
EOSINOPHIL NFR BLD AUTO: 2 % (ref 0–6)
ERYTHROCYTE [DISTWIDTH] IN BLOOD BY AUTOMATED COUNT: 13.7 % (ref 11.6–15.1)
FOLATE SERPL-MCNC: 14.2 NG/ML
GFR SERPL CREATININE-BSD FRML MDRD: 92 ML/MIN/1.73SQ M
GLUCOSE P FAST SERPL-MCNC: 81 MG/DL (ref 65–99)
HCT VFR BLD AUTO: 42.4 % (ref 34.8–46.1)
HDLC SERPL-MCNC: 75 MG/DL
HGB BLD-MCNC: 13.7 G/DL (ref 11.5–15.4)
IMM GRANULOCYTES # BLD AUTO: 0 THOUSAND/UL (ref 0–0.2)
IMM GRANULOCYTES NFR BLD AUTO: 0 % (ref 0–2)
LDLC SERPL CALC-MCNC: 109 MG/DL (ref 0–100)
LYMPHOCYTES # BLD AUTO: 2.04 THOUSANDS/ÂΜL (ref 0.6–4.47)
LYMPHOCYTES NFR BLD AUTO: 45 % (ref 14–44)
MCH RBC QN AUTO: 30 PG (ref 26.8–34.3)
MCHC RBC AUTO-ENTMCNC: 32.3 G/DL (ref 31.4–37.4)
MCV RBC AUTO: 93 FL (ref 82–98)
MONOCYTES # BLD AUTO: 0.5 THOUSAND/ÂΜL (ref 0.17–1.22)
MONOCYTES NFR BLD AUTO: 11 % (ref 4–12)
NEUTROPHILS # BLD AUTO: 1.87 THOUSANDS/ÂΜL (ref 1.85–7.62)
NEUTS SEG NFR BLD AUTO: 41 % (ref 43–75)
NONHDLC SERPL-MCNC: 123 MG/DL
NRBC BLD AUTO-RTO: 0 /100 WBCS
PLATELET # BLD AUTO: 269 THOUSANDS/UL (ref 149–390)
PMV BLD AUTO: 10.7 FL (ref 8.9–12.7)
POTASSIUM SERPL-SCNC: 4.4 MMOL/L (ref 3.5–5.3)
PROT SERPL-MCNC: 6.8 G/DL (ref 6.4–8.4)
RBC # BLD AUTO: 4.57 MILLION/UL (ref 3.81–5.12)
SODIUM SERPL-SCNC: 138 MMOL/L (ref 135–147)
TRIGL SERPL-MCNC: 70 MG/DL
TSH SERPL DL<=0.05 MIU/L-ACNC: 2.24 UIU/ML (ref 0.45–4.5)
VIT B12 SERPL-MCNC: 217 PG/ML (ref 180–914)
WBC # BLD AUTO: 4.55 THOUSAND/UL (ref 4.31–10.16)

## 2024-05-17 PROCEDURE — 84443 ASSAY THYROID STIM HORMONE: CPT

## 2024-05-17 PROCEDURE — 82607 VITAMIN B-12: CPT

## 2024-05-17 PROCEDURE — 82306 VITAMIN D 25 HYDROXY: CPT

## 2024-05-17 PROCEDURE — 85025 COMPLETE CBC W/AUTO DIFF WBC: CPT

## 2024-05-17 PROCEDURE — 80061 LIPID PANEL: CPT

## 2024-05-17 PROCEDURE — 36415 COLL VENOUS BLD VENIPUNCTURE: CPT

## 2024-05-17 PROCEDURE — 82746 ASSAY OF FOLIC ACID SERUM: CPT

## 2024-05-17 PROCEDURE — 83918 ORGANIC ACIDS TOTAL QUANT: CPT

## 2024-05-17 PROCEDURE — 80053 COMPREHEN METABOLIC PANEL: CPT

## 2024-05-21 ENCOUNTER — OFFICE VISIT (OUTPATIENT)
Dept: INTERNAL MEDICINE CLINIC | Facility: CLINIC | Age: 64
End: 2024-05-21
Payer: COMMERCIAL

## 2024-05-21 VITALS
BODY MASS INDEX: 31.89 KG/M2 | OXYGEN SATURATION: 97 % | HEART RATE: 75 BPM | DIASTOLIC BLOOD PRESSURE: 72 MMHG | SYSTOLIC BLOOD PRESSURE: 127 MMHG | HEIGHT: 63 IN | TEMPERATURE: 98.9 F | WEIGHT: 180 LBS

## 2024-05-21 DIAGNOSIS — I10 PRIMARY HYPERTENSION: Primary | ICD-10-CM

## 2024-05-21 DIAGNOSIS — E55.9 VITAMIN D DEFICIENCY: ICD-10-CM

## 2024-05-21 DIAGNOSIS — Z13.820 ENCOUNTER FOR OSTEOPOROSIS SCREENING IN ASYMPTOMATIC POSTMENOPAUSAL PATIENT: ICD-10-CM

## 2024-05-21 DIAGNOSIS — Z78.0 ENCOUNTER FOR OSTEOPOROSIS SCREENING IN ASYMPTOMATIC POSTMENOPAUSAL PATIENT: ICD-10-CM

## 2024-05-21 DIAGNOSIS — E53.8 B12 DEFICIENCY: ICD-10-CM

## 2024-05-21 LAB — METHYLMALONATE SERPL-SCNC: 116 NMOL/L (ref 0–378)

## 2024-05-21 PROCEDURE — 99213 OFFICE O/P EST LOW 20 MIN: CPT | Performed by: INTERNAL MEDICINE

## 2024-05-21 RX ORDER — NEOMYCIN POLYMYXIN B SULFATES AND DEXAMETHASONE 3.5; 10000; 1 MG/ML; [USP'U]/ML; MG/ML
SUSPENSION/ DROPS OPHTHALMIC
COMMUNITY

## 2024-05-21 RX ORDER — TRIAMCINOLONE ACETONIDE 40 MG/ML
40 INJECTION, SUSPENSION INTRA-ARTICULAR; INTRAMUSCULAR ONCE
COMMUNITY
Start: 2024-04-24

## 2024-05-21 RX ORDER — MECLIZINE HCL 12.5 MG/1
12.5 TABLET ORAL EVERY 8 HOURS PRN
COMMUNITY

## 2024-05-21 RX ORDER — AMLODIPINE BESYLATE 2.5 MG/1
2.5 TABLET ORAL DAILY
Qty: 90 TABLET | Refills: 1 | Status: SHIPPED | OUTPATIENT
Start: 2024-05-21 | End: 2024-11-17

## 2024-05-21 NOTE — PROGRESS NOTES
Assessment/Plan:    Diagnoses and all orders for this visit:    Primary hypertension  Comments:  BP logs reviewed, a.m. BP mildly elevated.  Recommend losartan 50 mg daily in the morning, amlodipine 2.5 mg p.m., DASH diet, follow-up in 3 months  Orders:  -     amLODIPine (NORVASC) 2.5 mg tablet; Take 1 tablet (2.5 mg total) by mouth daily    Vitamin D deficiency  -     Vitamin D 25 hydroxy; Future    Encounter for osteoporosis screening in asymptomatic postmenopausal patient  -     DXA bone density spine hip and pelvis; Future    B12 deficiency  Comments:  Recommend parenteral supplements, patient prefers oral supplements.    Other orders  -     meclizine (ANTIVERT) 12.5 MG tablet; Take 12.5 mg by mouth every 8 (eight) hours as needed for nausea or dizziness  -     triamcinolone acetonide (KENALOG-40) 40 mg/mL; Inject 40 mg into a muscle once  -     neomycin-polymyxin-dexamethasone (Maxitrol) 0.1 % ophthalmic suspension; INSTILL 1 DROP IN BOTH EYES FOUR TIMES A DAY              There are no Patient Instructions on file for this visit.    Subjective:      Patient ID: Autumn Calloway is a 63 y.o. female    HPI      Current Outpatient Medications:     amLODIPine (NORVASC) 2.5 mg tablet, Take 1 tablet (2.5 mg total) by mouth daily, Disp: 90 tablet, Rfl: 1    Cholecalciferol (VITAMIN D-3) 5000 units TABS, , Disp: , Rfl:     diclofenac sodium (VOLTAREN) 1 %, if needed, Disp: , Rfl:     EPINEPHrine (EPIPEN) 0.3 mg/0.3 mL SOAJ, , Disp: , Rfl:     estradiol (ESTRACE) 0.1 mg/g vaginal cream, As needed, Disp: , Rfl:     Fexofenadine HCl (ALLEGRA PO), Take 1 tablet by mouth in the morning, Disp: , Rfl:     levalbuterol (XOPENEX HFA) 45 mcg/act inhaler, Inhale 2 puffs every 4 (four) hours as needed, Disp: , Rfl:     losartan (COZAAR) 50 mg tablet, Take 1 tablet (50 mg total) by mouth daily, Disp: 90 tablet, Rfl: 3    meclizine (ANTIVERT) 12.5 MG tablet, Take 12.5 mg by mouth every 8 (eight) hours as needed for nausea or  dizziness, Disp: , Rfl:     mometasone (NASONEX) 50 mcg/act nasal spray, mometasone 50 mcg/actuation nasal spray, Disp: , Rfl:     mometasone-formoterol (Dulera) 100-5 MCG/ACT inhaler, Inhale 2 puffs 2 (two) times a day Rinse mouth after use., Disp: , Rfl:     neomycin-polymyxin-dexamethasone (Maxitrol) 0.1 % ophthalmic suspension, INSTILL 1 DROP IN BOTH EYES FOUR TIMES A DAY, Disp: , Rfl:     ondansetron (ZOFRAN) 4 mg tablet, Take 1 tablet (4 mg total) by mouth every 8 (eight) hours as needed for nausea or vomiting, Disp: 20 tablet, Rfl: 0    pantoprazole (PROTONIX) 40 mg tablet, Take 40 mg by mouth daily, Disp: , Rfl:     PreviDent 5000 Booster Plus 1.1 % PSTE, , Disp: , Rfl:     Restasis 0.05 % ophthalmic emulsion, Administer 1 drop to both eyes every 12 (twelve) hours, Disp: , Rfl:     Salicylic Acid 6 % CREA, As needed, Disp: , Rfl:     sodium chloride (OCEAN) 0.65 % nasal spray, 1 spray into each nostril every 2 (two) hours while awake, Disp: 60 mL, Rfl: 1    SUMAtriptan (IMITREX) 50 mg tablet, Take 1 tablet (50 mg total) by mouth once as needed for migraine for up to 1 dose, Disp: 9 tablet, Rfl: 0    Synthroid 100 MCG tablet, Take 100 mcg by mouth every other day, Disp: , Rfl:     Synthroid 112 MCG tablet, Take 112 mcg by mouth every other day, Disp: , Rfl:     triamcinolone acetonide (KENALOG-40) 40 mg/mL, Inject 40 mg into a muscle once, Disp: , Rfl:     urea (CARMOL) 40 %, if needed, Disp: , Rfl:     ALPRAZolam (XANAX) 0.25 mg tablet, once as needed (Patient not taking: Reported on 5/21/2024), Disp: , Rfl: 2    simethicone (MYLICON) 80 mg chewable tablet, Chew every 8 (eight) hours (Patient not taking: Reported on 8/25/2023), Disp: , Rfl:      Past Medical History:   Diagnosis Date    Allergic rhinitis     Asthma     Disease of thyroid gland     Hashimoto's    Fibroid     Hiatal hernia     Hypertension     Hypothyroidism     Primary osteoarthritis of knee     Left - last assessed: Aug 23, 2017    Seasonal  allergies     Varicella          Past Surgical History:   Procedure Laterality Date    APPENDECTOMY      COLONOSCOPY  06/30/2014    DIAGNOSTIC LAPAROSCOPY      HAND SURGERY Left     HYSTERECTOMY  06/28/2021    cervix intact    MAMMO (HISTORICAL)  11/26/2016    OOPHORECTOMY Bilateral 06/28/2021    THYROIDECTOMY  1998    partial         Allergies   Allergen Reactions    Bee Pollen Itching    Dog Epithelium (Canis Lupus Familiaris) Allergic Rhinitis    Other Allergic Rhinitis     Cats, dogs, grass, mildew, dust, mold    Pollen Extract-Tree Extract [Pollen Extract] Allergic Rhinitis       Recent Results (from the past 1008 hour(s))   Vitamin B12    Collection Time: 05/17/24  6:06 AM   Result Value Ref Range    Vitamin B-12 217 180 - 914 pg/mL   Folate    Collection Time: 05/17/24  6:06 AM   Result Value Ref Range    Folate 14.2 >5.9 ng/mL   CBC and differential    Collection Time: 05/17/24  6:06 AM   Result Value Ref Range    WBC 4.55 4.31 - 10.16 Thousand/uL    RBC 4.57 3.81 - 5.12 Million/uL    Hemoglobin 13.7 11.5 - 15.4 g/dL    Hematocrit 42.4 34.8 - 46.1 %    MCV 93 82 - 98 fL    MCH 30.0 26.8 - 34.3 pg    MCHC 32.3 31.4 - 37.4 g/dL    RDW 13.7 11.6 - 15.1 %    MPV 10.7 8.9 - 12.7 fL    Platelets 269 149 - 390 Thousands/uL    nRBC 0 /100 WBCs    Segmented % 41 (L) 43 - 75 %    Immature Grans % 0 0 - 2 %    Lymphocytes % 45 (H) 14 - 44 %    Monocytes % 11 4 - 12 %    Eosinophils Relative 2 0 - 6 %    Basophils Relative 1 0 - 1 %    Absolute Neutrophils 1.87 1.85 - 7.62 Thousands/µL    Absolute Immature Grans 0.00 0.00 - 0.20 Thousand/uL    Absolute Lymphocytes 2.04 0.60 - 4.47 Thousands/µL    Absolute Monocytes 0.50 0.17 - 1.22 Thousand/µL    Eosinophils Absolute 0.11 0.00 - 0.61 Thousand/µL    Basophils Absolute 0.03 0.00 - 0.10 Thousands/µL   Comprehensive metabolic panel    Collection Time: 05/17/24  6:06 AM   Result Value Ref Range    Sodium 138 135 - 147 mmol/L    Potassium 4.4 3.5 - 5.3 mmol/L    Chloride 103  96 - 108 mmol/L    CO2 28 21 - 32 mmol/L    ANION GAP 7 4 - 13 mmol/L    BUN 19 5 - 25 mg/dL    Creatinine 0.68 0.60 - 1.30 mg/dL    Glucose, Fasting 81 65 - 99 mg/dL    Calcium 8.9 8.4 - 10.2 mg/dL    AST 16 13 - 39 U/L    ALT 18 7 - 52 U/L    Alkaline Phosphatase 87 34 - 104 U/L    Total Protein 6.8 6.4 - 8.4 g/dL    Albumin 4.2 3.5 - 5.0 g/dL    Total Bilirubin 0.35 0.20 - 1.00 mg/dL    eGFR 92 ml/min/1.73sq m   Lipid panel    Collection Time: 05/17/24  6:06 AM   Result Value Ref Range    Cholesterol 198 See Comment mg/dL    Triglycerides 70 See Comment mg/dL    HDL, Direct 75 >=50 mg/dL    LDL Calculated 109 (H) 0 - 100 mg/dL    Non-HDL-Chol (CHOL-HDL) 123 mg/dl   TSH, 3rd generation with Free T4 reflex    Collection Time: 05/17/24  6:06 AM   Result Value Ref Range    TSH 3RD GENERATON 2.243 0.450 - 4.500 uIU/mL       The following portions of the patient's history were reviewed and updated as appropriate: allergies, current medications, past family history, past medical history, past social history, past surgical history and problem list.     Review of Systems   Constitutional:  Negative for appetite change, chills, diaphoresis, fatigue, fever and unexpected weight change.   Respiratory:  Negative for apnea, cough, choking, chest tightness, shortness of breath, wheezing and stridor.    Cardiovascular:  Negative for chest pain, palpitations and leg swelling.   Gastrointestinal:  Negative for abdominal distention, abdominal pain, anal bleeding, blood in stool, constipation, diarrhea, nausea and vomiting.   Genitourinary:  Negative for decreased urine volume, difficulty urinating, frequency and urgency.   Musculoskeletal:  Negative for arthralgias, back pain and myalgias.   Neurological:  Negative for dizziness, light-headedness, numbness and headaches.         Objective:      Vitals:    05/21/24 1624   BP: 127/72   Pulse: 75   Temp: 98.9 °F (37.2 °C)   SpO2: 97%          Physical Exam  Vitals reviewed.    Constitutional:       General: She is not in acute distress.     Appearance: Normal appearance. She is not ill-appearing, toxic-appearing or diaphoretic.   HENT:      Mouth/Throat:      Mouth: Mucous membranes are moist.   Cardiovascular:      Rate and Rhythm: Normal rate and regular rhythm.      Pulses: Normal pulses.      Heart sounds: Normal heart sounds. No murmur heard.     No friction rub. No gallop.   Pulmonary:      Effort: Pulmonary effort is normal. No respiratory distress.      Breath sounds: Normal breath sounds. No stridor. No wheezing, rhonchi or rales.   Chest:      Chest wall: No tenderness.   Musculoskeletal:      Right lower leg: No edema.      Left lower leg: No edema.   Skin:     General: Skin is warm and dry.      Findings: No lesion or rash.   Neurological:      Mental Status: She is alert and oriented to person, place, and time.

## 2024-05-23 LAB — 25(OH)D3+25(OH)D2 SERPL-MCNC: 33.3 NG/ML (ref 30–100)

## 2024-06-28 ENCOUNTER — APPOINTMENT (OUTPATIENT)
Dept: LAB | Facility: AMBULARY SURGERY CENTER | Age: 64
End: 2024-06-28
Payer: COMMERCIAL

## 2024-06-28 DIAGNOSIS — I51.9 MYXEDEMA HEART DISEASE: ICD-10-CM

## 2024-06-28 DIAGNOSIS — E66.3 SEVERELY OVERWEIGHT: ICD-10-CM

## 2024-06-28 DIAGNOSIS — E03.9 MYXEDEMA HEART DISEASE: ICD-10-CM

## 2024-06-28 DIAGNOSIS — E78.5 HYPERLIPOPROTEINEMIA: ICD-10-CM

## 2024-06-28 DIAGNOSIS — E89.0 POSTSURGICAL HYPOTHYROIDISM: ICD-10-CM

## 2024-06-28 DIAGNOSIS — I10 ESSENTIAL HYPERTENSION, MALIGNANT: ICD-10-CM

## 2024-06-28 LAB
ALBUMIN SERPL BCG-MCNC: 4.1 G/DL (ref 3.5–5)
ALP SERPL-CCNC: 93 U/L (ref 34–104)
ALT SERPL W P-5'-P-CCNC: 17 U/L (ref 7–52)
ANION GAP SERPL CALCULATED.3IONS-SCNC: 10 MMOL/L (ref 4–13)
AST SERPL W P-5'-P-CCNC: 21 U/L (ref 13–39)
BACTERIA UR QL AUTO: ABNORMAL /HPF
BASOPHILS # BLD AUTO: 0.05 THOUSANDS/ÂΜL (ref 0–0.1)
BASOPHILS NFR BLD AUTO: 1 % (ref 0–1)
BILIRUB SERPL-MCNC: 0.39 MG/DL (ref 0.2–1)
BILIRUB UR QL STRIP: NEGATIVE
BUN SERPL-MCNC: 18 MG/DL (ref 5–25)
CALCIUM SERPL-MCNC: 9 MG/DL (ref 8.4–10.2)
CHLORIDE SERPL-SCNC: 102 MMOL/L (ref 96–108)
CHOLEST SERPL-MCNC: 167 MG/DL
CLARITY UR: CLEAR
CO2 SERPL-SCNC: 28 MMOL/L (ref 21–32)
COLOR UR: COLORLESS
CREAT SERPL-MCNC: 0.65 MG/DL (ref 0.6–1.3)
CREAT UR-MCNC: 42.5 MG/DL
EOSINOPHIL # BLD AUTO: 0.13 THOUSAND/ÂΜL (ref 0–0.61)
EOSINOPHIL NFR BLD AUTO: 3 % (ref 0–6)
ERYTHROCYTE [DISTWIDTH] IN BLOOD BY AUTOMATED COUNT: 13.6 % (ref 11.6–15.1)
EST. AVERAGE GLUCOSE BLD GHB EST-MCNC: 111 MG/DL
GFR SERPL CREATININE-BSD FRML MDRD: 94 ML/MIN/1.73SQ M
GLUCOSE P FAST SERPL-MCNC: 79 MG/DL (ref 65–99)
GLUCOSE UR STRIP-MCNC: NEGATIVE MG/DL
HBA1C MFR BLD: 5.5 %
HCT VFR BLD AUTO: 43.3 % (ref 34.8–46.1)
HDLC SERPL-MCNC: 70 MG/DL
HGB BLD-MCNC: 14.2 G/DL (ref 11.5–15.4)
HGB UR QL STRIP.AUTO: NEGATIVE
IMM GRANULOCYTES # BLD AUTO: 0 THOUSAND/UL (ref 0–0.2)
IMM GRANULOCYTES NFR BLD AUTO: 0 % (ref 0–2)
KETONES UR STRIP-MCNC: NEGATIVE MG/DL
LDLC SERPL CALC-MCNC: 86 MG/DL (ref 0–100)
LEUKOCYTE ESTERASE UR QL STRIP: ABNORMAL
LYMPHOCYTES # BLD AUTO: 1.51 THOUSANDS/ÂΜL (ref 0.6–4.47)
LYMPHOCYTES NFR BLD AUTO: 36 % (ref 14–44)
MAGNESIUM SERPL-MCNC: 2.2 MG/DL (ref 1.9–2.7)
MCH RBC QN AUTO: 30.7 PG (ref 26.8–34.3)
MCHC RBC AUTO-ENTMCNC: 32.8 G/DL (ref 31.4–37.4)
MCV RBC AUTO: 94 FL (ref 82–98)
MICROALBUMIN UR-MCNC: <7 MG/L
MONOCYTES # BLD AUTO: 0.52 THOUSAND/ÂΜL (ref 0.17–1.22)
MONOCYTES NFR BLD AUTO: 13 % (ref 4–12)
NEUTROPHILS # BLD AUTO: 1.94 THOUSANDS/ÂΜL (ref 1.85–7.62)
NEUTS SEG NFR BLD AUTO: 47 % (ref 43–75)
NITRITE UR QL STRIP: NEGATIVE
NON-SQ EPI CELLS URNS QL MICRO: ABNORMAL /HPF
NONHDLC SERPL-MCNC: 97 MG/DL
NRBC BLD AUTO-RTO: 0 /100 WBCS
PH UR STRIP.AUTO: 7.5 [PH]
PHOSPHATE SERPL-MCNC: 3.9 MG/DL (ref 2.3–4.1)
PLATELET # BLD AUTO: 265 THOUSANDS/UL (ref 149–390)
PMV BLD AUTO: 11 FL (ref 8.9–12.7)
POTASSIUM SERPL-SCNC: 4.2 MMOL/L (ref 3.5–5.3)
PROT SERPL-MCNC: 6.7 G/DL (ref 6.4–8.4)
PROT UR STRIP-MCNC: NEGATIVE MG/DL
RBC # BLD AUTO: 4.62 MILLION/UL (ref 3.81–5.12)
RBC #/AREA URNS AUTO: ABNORMAL /HPF
SODIUM SERPL-SCNC: 140 MMOL/L (ref 135–147)
SP GR UR STRIP.AUTO: 1.01 (ref 1–1.03)
T4 FREE SERPL-MCNC: 0.83 NG/DL (ref 0.61–1.12)
TRIGL SERPL-MCNC: 53 MG/DL
TSH SERPL DL<=0.05 MIU/L-ACNC: 1.25 UIU/ML (ref 0.45–4.5)
UROBILINOGEN UR STRIP-ACNC: <2 MG/DL
WBC # BLD AUTO: 4.15 THOUSAND/UL (ref 4.31–10.16)
WBC #/AREA URNS AUTO: ABNORMAL /HPF

## 2024-06-28 PROCEDURE — 83615 LACTATE (LD) (LDH) ENZYME: CPT

## 2024-06-28 PROCEDURE — 81001 URINALYSIS AUTO W/SCOPE: CPT

## 2024-06-28 PROCEDURE — 84100 ASSAY OF PHOSPHORUS: CPT

## 2024-06-28 PROCEDURE — 82043 UR ALBUMIN QUANTITATIVE: CPT

## 2024-06-28 PROCEDURE — 83625 ASSAY OF LDH ENZYMES: CPT

## 2024-06-28 PROCEDURE — 84443 ASSAY THYROID STIM HORMONE: CPT

## 2024-06-28 PROCEDURE — 83735 ASSAY OF MAGNESIUM: CPT

## 2024-06-28 PROCEDURE — 82570 ASSAY OF URINE CREATININE: CPT

## 2024-06-28 PROCEDURE — 36415 COLL VENOUS BLD VENIPUNCTURE: CPT

## 2024-06-28 PROCEDURE — 80053 COMPREHEN METABOLIC PANEL: CPT

## 2024-06-28 PROCEDURE — 80061 LIPID PANEL: CPT

## 2024-06-28 PROCEDURE — 83036 HEMOGLOBIN GLYCOSYLATED A1C: CPT

## 2024-06-28 PROCEDURE — 84439 ASSAY OF FREE THYROXINE: CPT

## 2024-06-28 PROCEDURE — 85025 COMPLETE CBC W/AUTO DIFF WBC: CPT

## 2024-07-01 LAB
LDH SERPL-CCNC: 169 IU/L (ref 119–226)
LDH1 CFR SERPL ELPH: 30 % (ref 17–32)
LDH2 CFR SERPL ELPH: 32 % (ref 25–40)
LDH3 CFR SERPL ELPH: 21 % (ref 17–27)
LDH4 CFR SERPL ELPH: 8 % (ref 5–13)
LDH5 CFR SERPL ELPH: 9 % (ref 4–20)

## 2024-07-09 ENCOUNTER — OFFICE VISIT (OUTPATIENT)
Dept: INTERNAL MEDICINE CLINIC | Facility: CLINIC | Age: 64
End: 2024-07-09
Payer: COMMERCIAL

## 2024-07-09 VITALS
OXYGEN SATURATION: 98 % | SYSTOLIC BLOOD PRESSURE: 132 MMHG | HEIGHT: 63 IN | WEIGHT: 181 LBS | HEART RATE: 78 BPM | DIASTOLIC BLOOD PRESSURE: 82 MMHG | TEMPERATURE: 99.3 F | BODY MASS INDEX: 32.07 KG/M2

## 2024-07-09 DIAGNOSIS — Z12.4 SCREENING FOR CERVICAL CANCER: Primary | ICD-10-CM

## 2024-07-09 DIAGNOSIS — I10 PRIMARY HYPERTENSION: ICD-10-CM

## 2024-07-09 DIAGNOSIS — M54.50 ACUTE MIDLINE LOW BACK PAIN WITHOUT SCIATICA: ICD-10-CM

## 2024-07-09 DIAGNOSIS — Z01.84 IMMUNITY STATUS TESTING: ICD-10-CM

## 2024-07-09 DIAGNOSIS — Z23 ENCOUNTER FOR IMMUNIZATION: ICD-10-CM

## 2024-07-09 DIAGNOSIS — Z00.00 ANNUAL PHYSICAL EXAM: ICD-10-CM

## 2024-07-09 DIAGNOSIS — E66.09 CLASS 1 OBESITY DUE TO EXCESS CALORIES WITH SERIOUS COMORBIDITY AND BODY MASS INDEX (BMI) OF 30.0 TO 30.9 IN ADULT: ICD-10-CM

## 2024-07-09 PROCEDURE — 90471 IMMUNIZATION ADMIN: CPT

## 2024-07-09 PROCEDURE — 99396 PREV VISIT EST AGE 40-64: CPT | Performed by: INTERNAL MEDICINE

## 2024-07-09 PROCEDURE — 90677 PCV20 VACCINE IM: CPT

## 2024-07-09 NOTE — PATIENT INSTRUCTIONS
"Patient Education     Routine physical for adults   The Basics   Written by the doctors and editors at [facility]   What is a physical? -- A physical is a routine visit, or \"check-up,\" with your doctor. You might also hear it called a \"wellness visit\" or \"preventive visit.\"  During each visit, the doctor will:   Ask about your physical and mental health   Ask about your habits, behaviors, and lifestyle   Do an exam   Give you vaccines if needed   Talk to you about any medicines you take   Give advice about your health   Answer your questions  Getting regular check-ups is an important part of taking care of your health. It can help your doctor find and treat any problems you have. But it's also important for preventing health problems.  A routine physical is different from a \"sick visit.\" A sick visit is when you see a doctor because of a health concern or problem. Since physicals are scheduled ahead of time, you can think about what you want to ask the doctor.  How often should I get a physical? -- It depends on your age and health. In general, for people age 21 years and older:   If you are younger than 50 years, you might be able to get a physical every 3 years.   If you are 50 years or older, your doctor might recommend a physical every year.  If you have an ongoing health condition, like diabetes or high blood pressure, your doctor will probably want to see you more often.  What happens during a physical? -- In general, each visit will include:   Physical exam - The doctor or nurse will check your height, weight, heart rate, and blood pressure. They will also look at your eyes and ears. They will ask about how you are feeling and whether you have any symptoms that bother you.   Medicines - It's a good idea to bring a list of all the medicines you take to each doctor visit. Your doctor will talk to you about your medicines and answer any questions. Tell them if you are having any side effects that bother you. You " "should also tell them if you are having trouble paying for any of your medicines.   Habits and behaviors - This includes:   Your diet   Your exercise habits   Whether you smoke, drink alcohol, or use drugs   Whether you are sexually active   Whether you feel safe at home  Your doctor will talk to you about things you can do to improve your health and lower your risk of health problems. They will also offer help and support. For example, if you want to quit smoking, they can give you advice and might prescribe medicines. If you want to improve your diet or get more physical activity, they can help you with this, too.   Lab tests, if needed - The tests you get will depend on your age and situation. For example, your doctor might want to check your:   Cholesterol   Blood sugar   Iron level   Vaccines - The recommended vaccines will depend on your age, health, and what vaccines you already had. Vaccines are very important because they can prevent certain serious or deadly infections.   Discussion of screening - \"Screening\" means checking for diseases or other health problems before they cause symptoms. Your doctor can recommend screening based on your age, risk, and preferences. This might include tests to check for:   Cancer, such as breast, prostate, cervical, ovarian, colorectal, prostate, lung, or skin cancer   Sexually transmitted infections, such as chlamydia and gonorrhea   Mental health conditions like depression and anxiety  Your doctor will talk to you about the different types of screening tests. They can help you decide which screenings to have. They can also explain what the results might mean.   Answering questions - The physical is a good time to ask the doctor or nurse questions about your health. If needed, they can refer you to other doctors or specialists, too.  Adults older than 65 years often need other care, too. As you get older, your doctor will talk to you about:   How to prevent falling at " home   Hearing or vision tests   Memory testing   How to take your medicines safely   Making sure that you have the help and support you need at home  All topics are updated as new evidence becomes available and our peer review process is complete.  This topic retrieved from [employer] on: May 02, 2024.  Topic 155401 Version 1.0  Release: 32.4.3 - C32.122  © 2024 UpToDate, Inc. and/or its affiliates. All rights reserved.  Consumer Information Use and Disclaimer   Disclaimer: This generalized information is a limited summary of diagnosis, treatment, and/or medication information. It is not meant to be comprehensive and should be used as a tool to help the user understand and/or assess potential diagnostic and treatment options. It does NOT include all information about conditions, treatments, medications, side effects, or risks that may apply to a specific patient. It is not intended to be medical advice or a substitute for the medical advice, diagnosis, or treatment of a health care provider based on the health care provider's examination and assessment of a patient's specific and unique circumstances. Patients must speak with a health care provider for complete information about their health, medical questions, and treatment options, including any risks or benefits regarding use of medications. This information does not endorse any treatments or medications as safe, effective, or approved for treating a specific patient. UpToDate, Inc. and its affiliates disclaim any warranty or liability relating to this information or the use thereof.The use of this information is governed by the Terms of Use, available at https://www.[employer].com/en/know/clinical-effectiveness-terms. 2024© UpToDate, Inc. and its affiliates and/or licensors. All rights reserved.  Copyright   © 2024 UpToDate, Inc. and/or its affiliates. All rights reserved.

## 2024-07-09 NOTE — PROGRESS NOTES
Adult Annual Physical  Name: Autumn Calloway      : 1960      MRN: [id number]  Encounter Provider: Mariia Fairchild MD  Encounter Date: 2024   Encounter department: [facility] INTERNAL MEDICINE    Assessment & Plan   1. Screening for cervical cancer  -     Ambulatory Referral to Obstetrics / Gynecology; Future  2. Annual physical exam  3. Primary hypertension  Comments:  Notes some low BP in the last few weeks.  Hold amlodipine, continue losartan, follow-up with home BP logs encourage DASH diet  4. Class 1 obesity due to excess calories with serious comorbidity and body mass index (BMI) of 30.0 to 30.9 in adult  Comments:  Commend lifestyle modification  5. Acute midline low back pain without sciatica  -     XR spine thoracolumbar 2 vw; Future; Expected date: 2024  -     Ambulatory Referral to Physical Therapy; Future  6. Immunity status testing  -     Hepatitis B surface antibody; Future    Immunizations and preventive care screenings were discussed with patient today. Appropriate education was printed on patient's after visit summary.    Counseling:  Exercise: the importance of regular exercise/physical activity was discussed. Recommend exercise 3-5 times per week for at least 30 minutes.       Depression Screening and Follow-up Plan: Patient was screened for depression during today's encounter. They screened negative with a PHQ-2 score of 0.        History of Present Illness     Adult Annual Physical:  Patient presents for annual physical.     Diet and Physical Activity:  - Diet/Nutrition: well balanced diet.  - Exercise: walking.    Depression Screening:  - PHQ-2 Score: 0    General Health:  - Sleep: sleeps well.  - Hearing: normal hearing right ear and normal hearing left ear.  - Vision: no vision problems and wears glasses.  - Dental: regular dental visits.    /GYN Health:  - Follows with GYN: yes.   - Menopause: postmenopausal.   - History of STDs: no  - Contraception:  menopause.      Advanced Care Planning:  - Has an advanced directive?: no    - Has a durable medical POA?: no    - ACP document given to patient?: no      Review of Systems   Constitutional:  Negative for appetite change, chills, diaphoresis, fatigue, fever and unexpected weight change.   Respiratory:  Negative for apnea, cough, choking, chest tightness, shortness of breath, wheezing and stridor.    Cardiovascular:  Negative for chest pain, palpitations and leg swelling.   Gastrointestinal:  Negative for abdominal distention, abdominal pain, anal bleeding, blood in stool, constipation, diarrhea, nausea and vomiting.   Genitourinary:  Negative for decreased urine volume, difficulty urinating, frequency and urgency.   Musculoskeletal:  Negative for arthralgias, back pain and myalgias.   Neurological:  Negative for dizziness, light-headedness, numbness and headaches.     Current Outpatient Medications on File Prior to Visit   Medication Sig Dispense Refill    amLODIPine (NORVASC) 2.5 mg tablet Take 1 tablet (2.5 mg total) by mouth daily 90 tablet 1    Cholecalciferol (VITAMIN D-3) 5000 units TABS       diclofenac sodium (VOLTAREN) 1 % if needed      EPINEPHrine (EPIPEN) 0.3 mg/0.3 mL SOAJ       estradiol (ESTRACE) 0.1 mg/g vaginal cream As needed      Fexofenadine HCl (ALLEGRA PO) Take 1 tablet by mouth in the morning      levalbuterol (XOPENEX HFA) 45 mcg/act inhaler Inhale 2 puffs every 4 (four) hours as needed      losartan (COZAAR) 50 mg tablet Take 1 tablet (50 mg total) by mouth daily 90 tablet 3    meclizine (ANTIVERT) 12.5 MG tablet Take 12.5 mg by mouth every 8 (eight) hours as needed for nausea or dizziness      mometasone (NASONEX) 50 mcg/act nasal spray mometasone 50 mcg/actuation nasal spray      mometasone-formoterol (Dulera) 100-5 MCG/ACT inhaler Inhale 2 puffs 2 (two) times a day Rinse mouth after use.      neomycin-polymyxin-dexamethasone (Maxitrol) 0.1 % ophthalmic suspension INSTILL 1 DROP IN BOTH  "EYES FOUR TIMES A DAY      ondansetron (ZOFRAN) 4 mg tablet Take 1 tablet (4 mg total) by mouth every 8 (eight) hours as needed for nausea or vomiting 20 tablet 0    pantoprazole (PROTONIX) 40 mg tablet Take 40 mg by mouth daily      PreviDent 5000 Booster Plus 1.1 % PSTE       Restasis 0.05 % ophthalmic emulsion Administer 1 drop to both eyes every 12 (twelve) hours      Salicylic Acid 6 % CREA As needed      sodium chloride (OCEAN) 0.65 % nasal spray 1 spray into each nostril every 2 (two) hours while awake 60 mL 1    SUMAtriptan (IMITREX) 50 mg tablet Take 1 tablet (50 mg total) by mouth once as needed for migraine for up to 1 dose 9 tablet 0    Synthroid 100 MCG tablet Take 100 mcg by mouth every other day      Synthroid 112 MCG tablet Take 112 mcg by mouth every other day      triamcinolone acetonide (KENALOG-40) 40 mg/mL Inject 40 mg into a muscle once      urea (CARMOL) 40 % if needed      ALPRAZolam (XANAX) 0.25 mg tablet once as needed (Patient not taking: Reported on 5/21/2024)  2    simethicone (MYLICON) 80 mg chewable tablet Chew every 8 (eight) hours (Patient not taking: Reported on 8/25/2023)       No current facility-administered medications on file prior to visit.        Objective     /82 (BP Location: Left arm, Patient Position: Sitting, Cuff Size: Standard)   Pulse 78   Temp 99.3 °F (37.4 °C) (Temporal)   Ht 5' 3\" (1.6 m)   Wt 82.1 kg (181 lb)   LMP  (LMP Unknown)   SpO2 98%   BMI 32.06 kg/m²     Physical Exam  Constitutional:       General: She is not in acute distress.     Appearance: Normal appearance. She is normal weight. She is not ill-appearing, toxic-appearing or diaphoretic.   Cardiovascular:      Rate and Rhythm: Normal rate and regular rhythm.      Pulses: Normal pulses.      Heart sounds: Normal heart sounds. No murmur heard.     No gallop.   Pulmonary:      Effort: Pulmonary effort is normal. No respiratory distress.      Breath sounds: Normal breath sounds. No stridor. No " wheezing, rhonchi or rales.   Chest:      Chest wall: No tenderness.   Abdominal:      Palpations: Abdomen is soft.   Musculoskeletal:      Right lower leg: No edema.      Left lower leg: No edema.   Skin:     Findings: No lesion or rash.   Neurological:      Mental Status: She is alert and oriented to person, place, and time.

## 2024-07-22 ENCOUNTER — HOSPITAL ENCOUNTER (OUTPATIENT)
Dept: RADIOLOGY | Facility: HOSPITAL | Age: 64
Discharge: HOME | End: 2024-07-22
Payer: COMMERCIAL

## 2024-07-22 ENCOUNTER — APPOINTMENT (OUTPATIENT)
Dept: LAB | Facility: AMBULARY SURGERY CENTER | Age: 64
End: 2024-07-22
Payer: COMMERCIAL

## 2024-07-22 DIAGNOSIS — Z01.84 IMMUNITY STATUS TESTING: ICD-10-CM

## 2024-07-22 DIAGNOSIS — M54.50 ACUTE MIDLINE LOW BACK PAIN WITHOUT SCIATICA: ICD-10-CM

## 2024-07-22 PROCEDURE — 36415 COLL VENOUS BLD VENIPUNCTURE: CPT

## 2024-07-22 PROCEDURE — 72080 X-RAY EXAM THORACOLMB 2/> VW: CPT

## 2024-07-22 PROCEDURE — 86706 HEP B SURFACE ANTIBODY: CPT

## 2024-07-23 LAB — HBV SURFACE AB SER-ACNC: <3 MIU/ML

## 2024-07-25 ENCOUNTER — HOSPITAL ENCOUNTER (OUTPATIENT)
Dept: CT IMAGING | Facility: HOSPITAL | Age: 64
Discharge: HOME | End: 2024-07-25
Attending: SPECIALIST
Payer: COMMERCIAL

## 2024-07-25 DIAGNOSIS — R01.1 HEART MURMUR: ICD-10-CM

## 2024-07-25 PROCEDURE — 75571 CT HRT W/O DYE W/CA TEST: CPT

## 2024-08-12 ENCOUNTER — NURSE TRIAGE (OUTPATIENT)
Age: 64
End: 2024-08-12

## 2024-08-12 DIAGNOSIS — R04.0 EPISTAXIS: ICD-10-CM

## 2024-08-12 DIAGNOSIS — Z23 ENCOUNTER FOR IMMUNIZATION: Primary | ICD-10-CM

## 2024-08-12 NOTE — TELEPHONE ENCOUNTER
Order placed for ENT, hep B fxd to her pharmacy, she needs to see one of us for better bP control also

## 2024-08-12 NOTE — TELEPHONE ENCOUNTER
"Patient has had three mild- moderate nose bleeds since 8/11/24 via left nostril. She is not bleeding today. She has a history of nose bleeds in the past that required cauterization. She has allergies and uses Nasonex spray. She reports a  /100 without medication this am and 2nd reading of 145/90 30 minutes after taking medication while on phone. She is anxious regarding the nose bleeds. She is requesting a referral to [facility] (Dr. Dane Corona's group ). If PCP unable to do today, please ask covering provider if they can do. Patient is also requesting Hep B vaccine as discussed with PCP at visit.    Reason for Disposition   Mild-moderate nosebleed and bleeding has stopped now    Answer Assessment - Initial Assessment Questions  1. AMOUNT OF BLEEDING: \"How bad is the bleeding?\" \"How much blood was lost?\" \"Has the bleeding stopped?\"    - MILD: needed a couple tissues    - MODERATE: needed many tissues    - SEVERE: large blood clots, soaked many tissues, lasted more than 30 minutes       moderate  2. ONSET: \"When did the nosebleed start?\"       8/11/24  3. FREQUENCY: \"How many nosebleeds have you had in the last 24 hours?\"       1  4. RECURRENT SYMPTOMS: \"Have there been other recent nosebleeds?\" If Yes, ask: \"How long did it take you to stop the bleeding?\" \"What worked best?\"       3 nose bleeds in the past few days, history of needing cortorization of nose  5. CAUSE: \"What do you think caused this nosebleed?\"      unknow  6. LOCAL FACTORS: \"Do you have any cold symptoms?\", \"Have you been rubbing or picking at your nose?\"      no  7. SYSTEMIC FACTORS: \"Do you have high blood pressure or any bleeding problems?\"      High Blood pressure  8. BLOOD THINNERS: \"Do you take any blood thinners?\" (e.g., coumadin, heparin, aspirin, Plavix)      no  9. OTHER SYMPTOMS: \"Do you have any other symptoms?\" (e.g., lightheadedness)      no  10. PREGNANCY: \"Is there any chance you are pregnant?\" \"When was your last menstrual " "period?\"        Post-menopausal    Protocols used: Nosebleed-ADULT-[place]    "

## 2024-08-14 ENCOUNTER — OFFICE VISIT (OUTPATIENT)
Dept: INTERNAL MEDICINE CLINIC | Facility: CLINIC | Age: 64
End: 2024-08-14
Payer: COMMERCIAL

## 2024-08-14 VITALS
OXYGEN SATURATION: 98 % | HEIGHT: 63 IN | BODY MASS INDEX: 31.54 KG/M2 | SYSTOLIC BLOOD PRESSURE: 150 MMHG | HEART RATE: 68 BPM | WEIGHT: 178 LBS | DIASTOLIC BLOOD PRESSURE: 90 MMHG | TEMPERATURE: 98.4 F

## 2024-08-14 DIAGNOSIS — I10 PRIMARY HYPERTENSION: Primary | ICD-10-CM

## 2024-08-14 DIAGNOSIS — E86.0 DEHYDRATION: ICD-10-CM

## 2024-08-14 DIAGNOSIS — E66.09 CLASS 1 OBESITY DUE TO EXCESS CALORIES WITH SERIOUS COMORBIDITY AND BODY MASS INDEX (BMI) OF 30.0 TO 30.9 IN ADULT: ICD-10-CM

## 2024-08-14 DIAGNOSIS — R04.0 EPISTAXIS: ICD-10-CM

## 2024-08-14 PROCEDURE — 99214 OFFICE O/P EST MOD 30 MIN: CPT | Performed by: INTERNAL MEDICINE

## 2024-08-14 NOTE — PROGRESS NOTES
Assessment/Plan:           1. Primary hypertension  Comments:  Advised to restart amlodipine and monitor blood pressure at home.  2. Epistaxis  Comments:  Following with ENT and had cauterization done.  3. Dehydration  Comments:  Hydration with electrolyte solution advised.  4. Class 1 obesity due to excess calories with serious comorbidity and body mass index (BMI) of 30.0 to 30.9 in adult  Comments:  High-fiber low calorie diet advised.         1. Epistaxis      2. Primary hypertension      3. Dehydration         No problem-specific Assessment & Plan notes found for this encounter.           Subjective:      Patient ID: Autumn Calloway is a 63 y.o. female.    HPI    The following portions of the patient's history were reviewed and updated as appropriate: She  has a past medical history of Allergic rhinitis, Asthma, Disease of thyroid gland, Fibroid, GERD (gastroesophageal reflux disease), Hiatal hernia, Hypertension, Hypothyroidism, Migraine, Nasal congestion, Nosebleed, Primary osteoarthritis of knee, Seasonal allergies, TMJ dysfunction, and Varicella.  She   Patient Active Problem List    Diagnosis Date Noted    Hyperaldosteronism (HCC) 09/14/2023    Class 1 obesity due to excess calories with serious comorbidity and body mass index (BMI) of 30.0 to 30.9 in adult 09/14/2023    Elevated alkaline phosphatase level 03/16/2022    Gastroesophageal reflux disease 03/16/2022    Hiatal hernia 03/16/2022    Migraine with aura and without status migrainosus, not intractable 02/15/2022    Primary hypertension 02/15/2022    Obesity (BMI 30-39.9) 02/15/2022    Hypothyroidism due to Hashimoto's thyroiditis 02/15/2022    Neutropenia, unspecified (HCC) 02/15/2021    Prolapse of anterior vaginal wall 07/09/2019    Stress incontinence in female 07/09/2019    Chronic pain of left knee 07/17/2018    Arthritis of left knee 07/17/2018    Leiomyoma of uterus 01/11/2012    Asthma 07/20/2011     She  has a past surgical history that  includes Thyroidectomy (1998); Appendectomy; Hand surgery (Left); Diagnostic laparoscopy; Colonoscopy (06/30/2014); Mammo (historical) (11/26/2016); Hysterectomy (06/28/2021); Oophorectomy (Bilateral, 06/28/2021); and Tonsillectomy (1964?).  Her family history includes Arthritis in her mother; Autoimmune disease in her father; Breast cancer in her maternal aunt; Cancer in her father and mother; Colon cancer in her maternal grandmother; Diabetes in her father; Hearing loss in her father; Heart attack in her father; Heart disease in her family; Hyperlipidemia in her family and mother; Hypertension in her family and mother; No Known Problems in her daughter, maternal aunt, and maternal aunt; Skin cancer in her father and mother; Stroke in her father and mother; Thyroid disease in her father and mother.  She  reports that she has never smoked. She has never used smokeless tobacco. She reports that she does not currently use alcohol after a past usage of about 1.0 standard drink of alcohol per week. She reports that she does not use drugs.  Current Outpatient Medications   Medication Sig Dispense Refill    Cholecalciferol (VITAMIN D-3) 5000 units TABS       diclofenac sodium (VOLTAREN) 1 % if needed      EPINEPHrine (EPIPEN) 0.3 mg/0.3 mL SOAJ       estradiol (ESTRACE) 0.1 mg/g vaginal cream As needed      Fexofenadine HCl (ALLEGRA PO) Take 1 tablet by mouth in the morning      levalbuterol (XOPENEX HFA) 45 mcg/act inhaler Inhale 2 puffs every 4 (four) hours as needed      losartan (COZAAR) 50 mg tablet Take 1 tablet (50 mg total) by mouth daily 90 tablet 3    meclizine (ANTIVERT) 12.5 MG tablet Take 12.5 mg by mouth every 8 (eight) hours as needed for nausea or dizziness      mometasone (NASONEX) 50 mcg/act nasal spray mometasone 50 mcg/actuation nasal spray      mometasone-formoterol (Dulera) 100-5 MCG/ACT inhaler Inhale 2 puffs 2 (two) times a day Rinse mouth after use.      neomycin-polymyxin-dexamethasone (Maxitrol)  0.1 % ophthalmic suspension INSTILL 1 DROP IN BOTH EYES FOUR TIMES A DAY      ondansetron (ZOFRAN) 4 mg tablet Take 1 tablet (4 mg total) by mouth every 8 (eight) hours as needed for nausea or vomiting 20 tablet 0    pantoprazole (PROTONIX) 40 mg tablet Take 40 mg by mouth daily      PreviDent 5000 Booster Plus 1.1 % PSTE       Restasis 0.05 % ophthalmic emulsion Administer 1 drop to both eyes every 12 (twelve) hours      Salicylic Acid 6 % CREA As needed      sodium chloride (OCEAN) 0.65 % nasal spray 1 spray into each nostril every 2 (two) hours while awake 60 mL 1    SUMAtriptan (IMITREX) 50 mg tablet Take 1 tablet (50 mg total) by mouth once as needed for migraine for up to 1 dose 9 tablet 0    Synthroid 100 MCG tablet Take 100 mcg by mouth every other day      Synthroid 112 MCG tablet Take 112 mcg by mouth every other day      triamcinolone acetonide (KENALOG-40) 40 mg/mL Inject 40 mg into a muscle once      urea (CARMOL) 40 % if needed      ALPRAZolam (XANAX) 0.25 mg tablet once as needed (Patient not taking: Reported on 5/21/2024)  2    amLODIPine (NORVASC) 2.5 mg tablet Take 1 tablet (2.5 mg total) by mouth daily 90 tablet 1    simethicone (MYLICON) 80 mg chewable tablet Chew every 8 (eight) hours (Patient not taking: Reported on 8/25/2023)       No current facility-administered medications for this visit.     Current Outpatient Medications on File Prior to Visit   Medication Sig    Cholecalciferol (VITAMIN D-3) 5000 units TABS     diclofenac sodium (VOLTAREN) 1 % if needed    EPINEPHrine (EPIPEN) 0.3 mg/0.3 mL SOAJ     estradiol (ESTRACE) 0.1 mg/g vaginal cream As needed    Fexofenadine HCl (ALLEGRA PO) Take 1 tablet by mouth in the morning    levalbuterol (XOPENEX HFA) 45 mcg/act inhaler Inhale 2 puffs every 4 (four) hours as needed    losartan (COZAAR) 50 mg tablet Take 1 tablet (50 mg total) by mouth daily    meclizine (ANTIVERT) 12.5 MG tablet Take 12.5 mg by mouth every 8 (eight) hours as needed for  nausea or dizziness    mometasone (NASONEX) 50 mcg/act nasal spray mometasone 50 mcg/actuation nasal spray    mometasone-formoterol (Dulera) 100-5 MCG/ACT inhaler Inhale 2 puffs 2 (two) times a day Rinse mouth after use.    neomycin-polymyxin-dexamethasone (Maxitrol) 0.1 % ophthalmic suspension INSTILL 1 DROP IN BOTH EYES FOUR TIMES A DAY    ondansetron (ZOFRAN) 4 mg tablet Take 1 tablet (4 mg total) by mouth every 8 (eight) hours as needed for nausea or vomiting    pantoprazole (PROTONIX) 40 mg tablet Take 40 mg by mouth daily    PreviDent 5000 Booster Plus 1.1 % PSTE     Restasis 0.05 % ophthalmic emulsion Administer 1 drop to both eyes every 12 (twelve) hours    Salicylic Acid 6 % CREA As needed    sodium chloride (OCEAN) 0.65 % nasal spray 1 spray into each nostril every 2 (two) hours while awake    SUMAtriptan (IMITREX) 50 mg tablet Take 1 tablet (50 mg total) by mouth once as needed for migraine for up to 1 dose    Synthroid 100 MCG tablet Take 100 mcg by mouth every other day    Synthroid 112 MCG tablet Take 112 mcg by mouth every other day    triamcinolone acetonide (KENALOG-40) 40 mg/mL Inject 40 mg into a muscle once    urea (CARMOL) 40 % if needed    ALPRAZolam (XANAX) 0.25 mg tablet once as needed (Patient not taking: Reported on 5/21/2024)    amLODIPine (NORVASC) 2.5 mg tablet Take 1 tablet (2.5 mg total) by mouth daily    simethicone (MYLICON) 80 mg chewable tablet Chew every 8 (eight) hours (Patient not taking: Reported on 8/25/2023)     No current facility-administered medications on file prior to visit.     There are no discontinued medications.   She is allergic to bee pollen, dog epithelium (canis lupus familiaris), other, and pollen extract-tree extract [pollen extract]..    Review of Systems   Constitutional:  Negative for appetite change, chills, fatigue and fever.   HENT:  Negative for sore throat and trouble swallowing.    Eyes:  Negative for redness.   Respiratory:  Negative for shortness of  "breath.    Cardiovascular:  Negative for chest pain and palpitations.   Gastrointestinal:  Negative for abdominal pain, constipation and diarrhea.   Genitourinary:  Negative for dysuria and hematuria.   Musculoskeletal:  Negative for back pain and neck pain.   Skin:  Negative for rash.   Neurological:  Negative for seizures, weakness and headaches.   Hematological:  Negative for adenopathy.   Psychiatric/Behavioral:  Negative for confusion. The patient is not nervous/anxious.          Objective:      /90 (BP Location: Left arm, Patient Position: Sitting, Cuff Size: Standard)   Pulse 68   Temp 98.4 °F (36.9 °C) (Temporal)   Ht 5' 3\" (1.6 m)   Wt 80.7 kg (178 lb)   LMP  (LMP Unknown)   SpO2 98%   BMI 31.53 kg/m²     Results Reviewed       None            Recent Results (from the past 1344 hour(s))   Urinalysis with microscopic    Collection Time: 06/28/24  8:11 AM   Result Value Ref Range    Color, UA Colorless     Clarity, UA Clear     Specific Gravity, UA 1.013 1.003 - 1.030    pH, UA 7.5 4.5, 5.0, 5.5, 6.0, 6.5, 7.0, 7.5, 8.0    Leukocytes, UA Trace (A) Negative    Nitrite, UA Negative Negative    Protein, UA Negative Negative mg/dl    Glucose, UA Negative Negative mg/dl    Ketones, UA Negative Negative mg/dl    Urobilinogen, UA <2.0 <2.0 mg/dl mg/dl    Bilirubin, UA Negative Negative    Occult Blood, UA Negative Negative    RBC, UA 1-2 None Seen, 1-2 /hpf    WBC, UA 1-2 None Seen, 1-2 /hpf    Epithelial Cells Occasional None Seen, Occasional /hpf    Bacteria, UA None Seen None Seen, Occasional /hpf   Albumin / creatinine urine ratio    Collection Time: 06/28/24  8:11 AM   Result Value Ref Range    Creatinine, Ur 42.5 Reference range not established. mg/dL    Albumin,U,Random <7.0 <20.0 mg/L    Albumin Creat Ratio     Comprehensive metabolic panel    Collection Time: 06/28/24  8:11 AM   Result Value Ref Range    Sodium 140 135 - 147 mmol/L    Potassium 4.2 3.5 - 5.3 mmol/L    Chloride 102 96 - 108 " mmol/L    CO2 28 21 - 32 mmol/L    ANION GAP 10 4 - 13 mmol/L    BUN 18 5 - 25 mg/dL    Creatinine 0.65 0.60 - 1.30 mg/dL    Glucose, Fasting 79 65 - 99 mg/dL    Calcium 9.0 8.4 - 10.2 mg/dL    AST 21 13 - 39 U/L    ALT 17 7 - 52 U/L    Alkaline Phosphatase 93 34 - 104 U/L    Total Protein 6.7 6.4 - 8.4 g/dL    Albumin 4.1 3.5 - 5.0 g/dL    Total Bilirubin 0.39 0.20 - 1.00 mg/dL    eGFR 94 ml/min/1.73sq m   CBC and differential    Collection Time: 06/28/24  8:11 AM   Result Value Ref Range    WBC 4.15 (L) 4.31 - 10.16 Thousand/uL    RBC 4.62 3.81 - 5.12 Million/uL    Hemoglobin 14.2 11.5 - 15.4 g/dL    Hematocrit 43.3 34.8 - 46.1 %    MCV 94 82 - 98 fL    MCH 30.7 26.8 - 34.3 pg    MCHC 32.8 31.4 - 37.4 g/dL    RDW 13.6 11.6 - 15.1 %    MPV 11.0 8.9 - 12.7 fL    Platelets 265 149 - 390 Thousands/uL    nRBC 0 /100 WBCs    Segmented % 47 43 - 75 %    Immature Grans % 0 0 - 2 %    Lymphocytes % 36 14 - 44 %    Monocytes % 13 (H) 4 - 12 %    Eosinophils Relative 3 0 - 6 %    Basophils Relative 1 0 - 1 %    Absolute Neutrophils 1.94 1.85 - 7.62 Thousands/µL    Absolute Immature Grans 0.00 0.00 - 0.20 Thousand/uL    Absolute Lymphocytes 1.51 0.60 - 4.47 Thousands/µL    Absolute Monocytes 0.52 0.17 - 1.22 Thousand/µL    Eosinophils Absolute 0.13 0.00 - 0.61 Thousand/µL    Basophils Absolute 0.05 0.00 - 0.10 Thousands/µL   Magnesium    Collection Time: 06/28/24  8:11 AM   Result Value Ref Range    Magnesium 2.2 1.9 - 2.7 mg/dL   TSH, 3rd generation    Collection Time: 06/28/24  8:11 AM   Result Value Ref Range    TSH 3RD GENERATON 1.253 0.450 - 4.500 uIU/mL   T4, free    Collection Time: 06/28/24  8:11 AM   Result Value Ref Range    Free T4 0.83 0.61 - 1.12 ng/dL   Hemoglobin A1C    Collection Time: 06/28/24  8:11 AM   Result Value Ref Range    Hemoglobin A1C 5.5 Normal 4.0-5.6%; PreDiabetic 5.7-6.4%; Diabetic >=6.5%; Glycemic control for adults with diabetes <7.0% %     mg/dl   Phosphorus    Collection Time:  06/28/24  8:11 AM   Result Value Ref Range    Phosphorus 3.9 2.3 - 4.1 mg/dL   Lactate dehydrogenase, isoenzymes    Collection Time: 06/28/24  8:11 AM   Result Value Ref Range     119 - 226 IU/L    LD-1 30 17 - 32 %    LD-2 32 25 - 40 %    LD-3 21 17 - 27 %    LD-4 8 5 - 13 %    LD-5 9 4 - 20 %   Lipid panel    Collection Time: 06/28/24  8:11 AM   Result Value Ref Range    Cholesterol 167 See Comment mg/dL    Triglycerides 53 See Comment mg/dL    HDL, Direct 70 >=50 mg/dL    LDL Calculated 86 0 - 100 mg/dL    Non-HDL-Chol (CHOL-HDL) 97 mg/dl   Hepatitis B surface antibody    Collection Time: 07/22/24  1:48 PM   Result Value Ref Range    Hep B S Ab <3.00 3-500 mIU/mL mIU/mL        Physical Exam  Constitutional:       General: She is not in acute distress.     Appearance: Normal appearance. She is obese.   HENT:      Head: Normocephalic and atraumatic.      Nose: Nose normal.      Mouth/Throat:      Mouth: Mucous membranes are moist.   Eyes:      Extraocular Movements: Extraocular movements intact.      Pupils: Pupils are equal, round, and reactive to light.   Cardiovascular:      Rate and Rhythm: Normal rate and regular rhythm.      Pulses: Normal pulses.      Heart sounds: Normal heart sounds. No murmur heard.     No friction rub.   Pulmonary:      Effort: Pulmonary effort is normal. No respiratory distress.      Breath sounds: Normal breath sounds. No wheezing.   Abdominal:      General: Abdomen is flat. Bowel sounds are normal. There is no distension.      Palpations: Abdomen is soft. There is no mass.      Tenderness: There is no abdominal tenderness. There is no guarding.   Musculoskeletal:         General: Normal range of motion.      Cervical back: Neck supple.   Neurological:      General: No focal deficit present.      Mental Status: She is alert and oriented to person, place, and time. Mental status is at baseline.      Cranial Nerves: No cranial nerve deficit.   Psychiatric:         Mood and Affect:  Mood normal.         Behavior: Behavior normal.

## 2024-08-16 ENCOUNTER — HOSPITAL ENCOUNTER (OUTPATIENT)
Dept: VASCULAR ULTRASOUND | Facility: HOSPITAL | Age: 64
Discharge: HOME | End: 2024-08-16
Attending: SPECIALIST
Payer: COMMERCIAL

## 2024-08-16 ENCOUNTER — HOSPITAL ENCOUNTER (OUTPATIENT)
Dept: BONE DENSITY | Facility: CLINIC | Age: 64
End: 2024-08-16
Payer: COMMERCIAL

## 2024-08-16 DIAGNOSIS — I83.93 ASYMPTOMATIC VARICOSE VEINS OF BILATERAL LOWER EXTREMITIES: ICD-10-CM

## 2024-08-16 DIAGNOSIS — Z13.820 ENCOUNTER FOR OSTEOPOROSIS SCREENING IN ASYMPTOMATIC POSTMENOPAUSAL PATIENT: ICD-10-CM

## 2024-08-16 DIAGNOSIS — E03.9 HYPOTHYROIDISM, UNSPECIFIED: ICD-10-CM

## 2024-08-16 DIAGNOSIS — Z78.0 ENCOUNTER FOR OSTEOPOROSIS SCREENING IN ASYMPTOMATIC POSTMENOPAUSAL PATIENT: ICD-10-CM

## 2024-08-16 DIAGNOSIS — E34.9 ENDOCRINE DISORDER, UNSPECIFIED: ICD-10-CM

## 2024-08-16 PROCEDURE — 77080 DXA BONE DENSITY AXIAL: CPT

## 2024-08-16 PROCEDURE — 93970 EXTREMITY STUDY: CPT

## 2024-08-16 PROCEDURE — 93970 EXTREMITY STUDY: CPT | Performed by: SURGERY

## 2024-08-29 ENCOUNTER — OFFICE VISIT (OUTPATIENT)
Dept: INTERNAL MEDICINE CLINIC | Facility: CLINIC | Age: 64
End: 2024-08-29
Payer: COMMERCIAL

## 2024-08-29 VITALS
BODY MASS INDEX: 30.83 KG/M2 | WEIGHT: 174 LBS | HEART RATE: 78 BPM | HEIGHT: 63 IN | TEMPERATURE: 98.2 F | OXYGEN SATURATION: 98 % | DIASTOLIC BLOOD PRESSURE: 82 MMHG | SYSTOLIC BLOOD PRESSURE: 136 MMHG

## 2024-08-29 DIAGNOSIS — I10 PRIMARY HYPERTENSION: Primary | ICD-10-CM

## 2024-08-29 DIAGNOSIS — F41.8 SITUATIONAL ANXIETY: ICD-10-CM

## 2024-08-29 PROCEDURE — 99213 OFFICE O/P EST LOW 20 MIN: CPT | Performed by: INTERNAL MEDICINE

## 2024-08-29 RX ORDER — ALPRAZOLAM 0.25 MG
0.25 TABLET ORAL
Qty: 2 TABLET | Refills: 0 | Status: SHIPPED | OUTPATIENT
Start: 2024-08-29

## 2024-08-29 NOTE — PROGRESS NOTES
Assessment/Plan:    Diagnoses and all orders for this visit:    Primary hypertension    Situational anxiety  -     ALPRAZolam (XANAX) 0.25 mg tablet; Take 1 tablet (0.25 mg total) by mouth daily at bedtime as needed for anxiety       Hypertension-continue losartan 50 mg at night, hold off on amlodipine, continue home BP monitoring and uploaded through [employer].  Follow-up in 2 months encouraged DASH diet    Situational anxiety-has an upcoming flight to travel and needs antianxiety pills.    Discussed her DEXA scan-osteopenia, continue vitamin D and calcium       There are no Patient Instructions on file for this visit.    Subjective:      Patient ID: Autumn Calloway is a 63 y.o. female    HPI      Current Outpatient Medications:     ALPRAZolam (XANAX) 0.25 mg tablet, Take 1 tablet (0.25 mg total) by mouth daily at bedtime as needed for anxiety, Disp: 2 tablet, Rfl: 0    Cholecalciferol (VITAMIN D-3) 5000 units TABS, , Disp: , Rfl:     diclofenac sodium (VOLTAREN) 1 %, if needed, Disp: , Rfl:     EPINEPHrine (EPIPEN) 0.3 mg/0.3 mL SOAJ, , Disp: , Rfl:     estradiol (ESTRACE) 0.1 mg/g vaginal cream, As needed, Disp: , Rfl:     Fexofenadine HCl (ALLEGRA PO), Take 1 tablet by mouth in the morning, Disp: , Rfl:     levalbuterol (XOPENEX HFA) 45 mcg/act inhaler, Inhale 2 puffs every 4 (four) hours as needed, Disp: , Rfl:     losartan (COZAAR) 50 mg tablet, Take 1 tablet (50 mg total) by mouth daily, Disp: 90 tablet, Rfl: 3    meclizine (ANTIVERT) 12.5 MG tablet, Take 12.5 mg by mouth every 8 (eight) hours as needed for nausea or dizziness, Disp: , Rfl:     mometasone (NASONEX) 50 mcg/act nasal spray, mometasone 50 mcg/actuation nasal spray, Disp: , Rfl:     mometasone-formoterol (Dulera) 100-5 MCG/ACT inhaler, Inhale 2 puffs 2 (two) times a day Rinse mouth after use., Disp: , Rfl:     neomycin-polymyxin-dexamethasone (Maxitrol) 0.1 % ophthalmic suspension, INSTILL 1 DROP IN BOTH EYES FOUR TIMES A DAY, Disp: , Rfl:      ondansetron (ZOFRAN) 4 mg tablet, Take 1 tablet (4 mg total) by mouth every 8 (eight) hours as needed for nausea or vomiting, Disp: 20 tablet, Rfl: 0    pantoprazole (PROTONIX) 40 mg tablet, Take 40 mg by mouth daily, Disp: , Rfl:     PreviDent 5000 Booster Plus 1.1 % PSTE, , Disp: , Rfl:     Restasis 0.05 % ophthalmic emulsion, Administer 1 drop to both eyes every 12 (twelve) hours, Disp: , Rfl:     Salicylic Acid 6 % CREA, As needed, Disp: , Rfl:     sodium chloride (OCEAN) 0.65 % nasal spray, 1 spray into each nostril every 2 (two) hours while awake, Disp: 60 mL, Rfl: 1    SUMAtriptan (IMITREX) 50 mg tablet, Take 1 tablet (50 mg total) by mouth once as needed for migraine for up to 1 dose, Disp: 9 tablet, Rfl: 0    Synthroid 100 MCG tablet, Take 100 mcg by mouth every other day, Disp: , Rfl:     Synthroid 112 MCG tablet, Take 112 mcg by mouth every other day, Disp: , Rfl:     triamcinolone acetonide (KENALOG-40) 40 mg/mL, Inject 40 mg into a muscle once, Disp: , Rfl:     urea (CARMOL) 40 %, if needed, Disp: , Rfl:     amLODIPine (NORVASC) 2.5 mg tablet, Take 1 tablet (2.5 mg total) by mouth daily, Disp: 90 tablet, Rfl: 1    simethicone (MYLICON) 80 mg chewable tablet, Chew every 8 (eight) hours (Patient not taking: Reported on 8/25/2023), Disp: , Rfl:      Past Medical History:   Diagnosis Date    Allergic rhinitis     Asthma     Disease of thyroid gland     Hashimoto's    Fibroid     GERD (gastroesophageal reflux disease)     Hiatal hernia     Hypertension     Hypothyroidism     Migraine     Nasal congestion     Nosebleed     Primary osteoarthritis of knee     Left - last assessed: Aug 23, 2017    Seasonal allergies     TMJ dysfunction     Varicella          Past Surgical History:   Procedure Laterality Date    APPENDECTOMY      COLONOSCOPY  06/30/2014    DIAGNOSTIC LAPAROSCOPY      HAND SURGERY Left     HYSTERECTOMY  06/28/2021    cervix intact    MAMMO (HISTORICAL)  11/26/2016    OOPHORECTOMY Bilateral  06/28/2021    THYROIDECTOMY  1998    partial    TONSILLECTOMY  1964?         Allergies   Allergen Reactions    Bee Pollen Itching    Dog Epithelium (Canis Lupus Familiaris) Allergic Rhinitis    Other Allergic Rhinitis     Cats, dogs, grass, mildew, dust, mold    Pollen Extract-Tree Extract [Pollen Extract] Allergic Rhinitis       Recent Results (from the past 1008 hour(s))   Hepatitis B surface antibody    Collection Time: 07/22/24  1:48 PM   Result Value Ref Range    Hep B S Ab <3.00 3-500 mIU/mL mIU/mL       The following portions of the patient's history were reviewed and updated as appropriate: allergies, current medications, past family history, past medical history, past social history, past surgical history and problem list.     Review of Systems   Constitutional:  Negative for appetite change, chills, diaphoresis, fatigue, fever and unexpected weight change.   Respiratory:  Negative for apnea, cough, choking, chest tightness, shortness of breath, wheezing and stridor.    Cardiovascular:  Negative for chest pain, palpitations and leg swelling.   Gastrointestinal:  Negative for abdominal distention, abdominal pain, anal bleeding, blood in stool, constipation, diarrhea, nausea and vomiting.   Genitourinary:  Negative for decreased urine volume, difficulty urinating, frequency and urgency.   Musculoskeletal:  Negative for arthralgias, back pain and myalgias.   Neurological:  Negative for dizziness, light-headedness, numbness and headaches.         Objective:      Vitals:    08/29/24 1648   BP: 136/82   Pulse: 78   Temp: 98.2 °F (36.8 °C)   SpO2: 98%          Physical Exam  Vitals reviewed.   Constitutional:       General: She is not in acute distress.     Appearance: Normal appearance. She is not ill-appearing, toxic-appearing or diaphoretic.   HENT:      Mouth/Throat:      Mouth: Mucous membranes are moist.   Cardiovascular:      Rate and Rhythm: Normal rate and regular rhythm.      Pulses: Normal pulses.       Heart sounds: Normal heart sounds. No murmur heard.     No friction rub. No gallop.   Pulmonary:      Effort: Pulmonary effort is normal. No respiratory distress.      Breath sounds: Normal breath sounds. No stridor. No wheezing, rhonchi or rales.   Chest:      Chest wall: No tenderness.   Musculoskeletal:      Right lower leg: No edema.      Left lower leg: No edema.   Skin:     General: Skin is warm and dry.      Findings: No lesion or rash.   Neurological:      Mental Status: She is alert.

## 2024-09-10 NOTE — TELEPHONE ENCOUNTER
"Risk factors: BMI 34, sedentary, family history   Has lost 10 lbs since starting "dirty keto" diet     PLAN:  Reviewed possible medications that may help - defers for now   Has A1C scheduled with PCP - will notify me once complete.   If A1C >6.5% consider GLP 1 RA for treatment of weight loss and diabetes  " Pt called and would like you to call her back to talk about covid booster

## 2024-10-09 ENCOUNTER — HOSPITAL ENCOUNTER (OUTPATIENT)
Dept: MAMMOGRAPHY | Facility: HOSPITAL | Age: 64
Discharge: HOME | End: 2024-10-09
Attending: NURSE PRACTITIONER
Payer: COMMERCIAL

## 2024-10-09 VITALS — WEIGHT: 174 LBS | HEIGHT: 63 IN | BODY MASS INDEX: 30.83 KG/M2

## 2024-10-09 DIAGNOSIS — Z12.31 ENCOUNTER FOR SCREENING MAMMOGRAM FOR MALIGNANT NEOPLASM OF BREAST: ICD-10-CM

## 2024-10-09 PROCEDURE — 77063 BREAST TOMOSYNTHESIS BI: CPT

## 2024-10-09 PROCEDURE — 77067 SCR MAMMO BI INCL CAD: CPT

## 2024-10-14 NOTE — RESULT ENCOUNTER NOTE
Mammogram is normal.  Please inform patient that she needs to come in for a yearly update in order for me to continue reviewing her mammogram results.  (Last yearly was 6/2022)

## 2024-10-29 ENCOUNTER — APPOINTMENT (OUTPATIENT)
Dept: LAB | Facility: AMBULARY SURGERY CENTER | Age: 64
End: 2024-10-29
Payer: COMMERCIAL

## 2024-10-29 DIAGNOSIS — Z12.31 ENCOUNTER FOR SCREENING MAMMOGRAM FOR BREAST CANCER: ICD-10-CM

## 2024-10-29 LAB
ALBUMIN SERPL BCG-MCNC: 3.7 G/DL (ref 3.5–5)
ALP SERPL-CCNC: 83 U/L (ref 34–104)
ALT SERPL W P-5'-P-CCNC: 11 U/L (ref 7–52)
ANION GAP SERPL CALCULATED.3IONS-SCNC: 8 MMOL/L (ref 4–13)
AST SERPL W P-5'-P-CCNC: 13 U/L (ref 13–39)
BASOPHILS # BLD AUTO: 0.05 THOUSANDS/ΜL (ref 0–0.1)
BASOPHILS NFR BLD AUTO: 1 % (ref 0–1)
BILIRUB SERPL-MCNC: 0.34 MG/DL (ref 0.2–1)
BILIRUB UR QL STRIP: NEGATIVE
BUN SERPL-MCNC: 18 MG/DL (ref 5–25)
CALCIUM SERPL-MCNC: 8.3 MG/DL (ref 8.4–10.2)
CHLORIDE SERPL-SCNC: 104 MMOL/L (ref 96–108)
CLARITY UR: CLEAR
CO2 SERPL-SCNC: 28 MMOL/L (ref 21–32)
COLOR UR: COLORLESS
CREAT SERPL-MCNC: 0.66 MG/DL (ref 0.6–1.3)
CREAT UR-MCNC: 42.2 MG/DL
EOSINOPHIL # BLD AUTO: 0.11 THOUSAND/ΜL (ref 0–0.61)
EOSINOPHIL NFR BLD AUTO: 3 % (ref 0–6)
ERYTHROCYTE [DISTWIDTH] IN BLOOD BY AUTOMATED COUNT: 13.3 % (ref 11.6–15.1)
EST. AVERAGE GLUCOSE BLD GHB EST-MCNC: 120 MG/DL
GFR SERPL CREATININE-BSD FRML MDRD: 92 ML/MIN/1.73SQ M
GLUCOSE P FAST SERPL-MCNC: 84 MG/DL (ref 65–99)
GLUCOSE UR STRIP-MCNC: NEGATIVE MG/DL
HBA1C MFR BLD: 5.8 %
HCT VFR BLD AUTO: 39.9 % (ref 34.8–46.1)
HGB BLD-MCNC: 12.7 G/DL (ref 11.5–15.4)
HGB UR QL STRIP.AUTO: NEGATIVE
IMM GRANULOCYTES # BLD AUTO: 0.01 THOUSAND/UL (ref 0–0.2)
IMM GRANULOCYTES NFR BLD AUTO: 0 % (ref 0–2)
KETONES UR STRIP-MCNC: NEGATIVE MG/DL
LEUKOCYTE ESTERASE UR QL STRIP: NEGATIVE
LYMPHOCYTES # BLD AUTO: 1.6 THOUSANDS/ΜL (ref 0.6–4.47)
LYMPHOCYTES NFR BLD AUTO: 38 % (ref 14–44)
MAGNESIUM SERPL-MCNC: 2.1 MG/DL (ref 1.9–2.7)
MCH RBC QN AUTO: 29.7 PG (ref 26.8–34.3)
MCHC RBC AUTO-ENTMCNC: 31.8 G/DL (ref 31.4–37.4)
MCV RBC AUTO: 93 FL (ref 82–98)
MICROALBUMIN UR-MCNC: <7 MG/L
MONOCYTES # BLD AUTO: 0.53 THOUSAND/ΜL (ref 0.17–1.22)
MONOCYTES NFR BLD AUTO: 13 % (ref 4–12)
NEUTROPHILS # BLD AUTO: 1.94 THOUSANDS/ΜL (ref 1.85–7.62)
NEUTS SEG NFR BLD AUTO: 45 % (ref 43–75)
NITRITE UR QL STRIP: NEGATIVE
NRBC BLD AUTO-RTO: 0 /100 WBCS
PH UR STRIP.AUTO: 6.5 [PH]
PLATELET # BLD AUTO: 252 THOUSANDS/UL (ref 149–390)
PMV BLD AUTO: 11.3 FL (ref 8.9–12.7)
POTASSIUM SERPL-SCNC: 4.3 MMOL/L (ref 3.5–5.3)
PROT SERPL-MCNC: 6 G/DL (ref 6.4–8.4)
PROT UR STRIP-MCNC: NEGATIVE MG/DL
RBC # BLD AUTO: 4.28 MILLION/UL (ref 3.81–5.12)
SODIUM SERPL-SCNC: 140 MMOL/L (ref 135–147)
SP GR UR STRIP.AUTO: 1.01 (ref 1–1.03)
T4 FREE SERPL-MCNC: 1.17 NG/DL (ref 0.61–1.12)
TSH SERPL DL<=0.05 MIU/L-ACNC: 1.33 UIU/ML (ref 0.45–4.5)
UROBILINOGEN UR STRIP-ACNC: <2 MG/DL
WBC # BLD AUTO: 4.24 THOUSAND/UL (ref 4.31–10.16)

## 2024-10-29 PROCEDURE — 83036 HEMOGLOBIN GLYCOSYLATED A1C: CPT

## 2024-10-29 PROCEDURE — 85025 COMPLETE CBC W/AUTO DIFF WBC: CPT

## 2024-10-29 PROCEDURE — 83735 ASSAY OF MAGNESIUM: CPT

## 2024-10-29 PROCEDURE — 81003 URINALYSIS AUTO W/O SCOPE: CPT

## 2024-10-29 PROCEDURE — 84439 ASSAY OF FREE THYROXINE: CPT

## 2024-10-29 PROCEDURE — 82043 UR ALBUMIN QUANTITATIVE: CPT

## 2024-10-29 PROCEDURE — 80053 COMPREHEN METABOLIC PANEL: CPT

## 2024-10-29 PROCEDURE — 82570 ASSAY OF URINE CREATININE: CPT

## 2024-10-29 PROCEDURE — 36415 COLL VENOUS BLD VENIPUNCTURE: CPT

## 2024-10-29 PROCEDURE — 84443 ASSAY THYROID STIM HORMONE: CPT

## 2024-12-10 ENCOUNTER — OFFICE VISIT (OUTPATIENT)
Dept: INTERNAL MEDICINE CLINIC | Facility: CLINIC | Age: 64
End: 2024-12-10
Payer: COMMERCIAL

## 2024-12-10 VITALS
OXYGEN SATURATION: 97 % | BODY MASS INDEX: 31.89 KG/M2 | HEART RATE: 76 BPM | TEMPERATURE: 98.6 F | DIASTOLIC BLOOD PRESSURE: 82 MMHG | HEIGHT: 63 IN | SYSTOLIC BLOOD PRESSURE: 142 MMHG | WEIGHT: 180 LBS

## 2024-12-10 DIAGNOSIS — I10 PRIMARY HYPERTENSION: ICD-10-CM

## 2024-12-10 DIAGNOSIS — M54.50 ACUTE RIGHT-SIDED LOW BACK PAIN WITHOUT SCIATICA: Primary | ICD-10-CM

## 2024-12-10 DIAGNOSIS — R35.0 FREQUENCY OF MICTURITION: ICD-10-CM

## 2024-12-10 DIAGNOSIS — J45.20 MILD INTERMITTENT ASTHMA, UNSPECIFIED WHETHER COMPLICATED: ICD-10-CM

## 2024-12-10 LAB
SL AMB POCT BLOOD,UA: NORMAL
SL AMB POCT PH,UA: 6
SL AMB POCT SPECIFIC GRAVITY,UA: 1

## 2024-12-10 PROCEDURE — 81003 URINALYSIS AUTO W/O SCOPE: CPT | Performed by: INTERNAL MEDICINE

## 2024-12-10 PROCEDURE — 99214 OFFICE O/P EST MOD 30 MIN: CPT | Performed by: INTERNAL MEDICINE

## 2024-12-10 PROCEDURE — 87086 URINE CULTURE/COLONY COUNT: CPT | Performed by: INTERNAL MEDICINE

## 2024-12-10 NOTE — PROGRESS NOTES
Assessment/Plan:           1. Acute right-sided low back pain without sciatica  Comments:  Most likely musculoskeletal physical therapy advised.  Orders:  -     Ambulatory Referral to Physical Therapy; Future  -     POCT urine dip  2. Frequency of micturition  -     Urine culture; Future  -     Urine culture  3. Primary hypertension  Comments:  Continue losartan.  4. Mild intermittent asthma, unspecified whether complicated         1. Acute right-sided low back pain without sciatica (Primary)         No problem-specific Assessment & Plan notes found for this encounter.           Subjective:      Patient ID: Autumn Calloway is a 64 y.o. female.    HPI    The following portions of the patient's history were reviewed and updated as appropriate: She  has a past medical history of Allergic rhinitis, Asthma, Disease of thyroid gland, Fibroid, GERD (gastroesophageal reflux disease), Hiatal hernia, Hypertension, Hypothyroidism, Migraine, Nasal congestion, Nosebleed, Primary osteoarthritis of knee, Seasonal allergies, TMJ dysfunction, and Varicella.  She   Patient Active Problem List    Diagnosis Date Noted    Hyperaldosteronism (HCC) 09/14/2023    Class 1 obesity due to excess calories with serious comorbidity and body mass index (BMI) of 30.0 to 30.9 in adult 09/14/2023    Elevated alkaline phosphatase level 03/16/2022    Gastroesophageal reflux disease 03/16/2022    Hiatal hernia 03/16/2022    Migraine with aura and without status migrainosus, not intractable 02/15/2022    Primary hypertension 02/15/2022    Obesity (BMI 30-39.9) 02/15/2022    Hypothyroidism due to Hashimoto's thyroiditis 02/15/2022    Neutropenia, unspecified (HCC) 02/15/2021    Prolapse of anterior vaginal wall 07/09/2019    Stress incontinence in female 07/09/2019    Chronic pain of left knee 07/17/2018    Arthritis of left knee 07/17/2018    Leiomyoma of uterus 01/11/2012    Asthma 07/20/2011     She  has a past surgical history that includes  Thyroidectomy (1998); Appendectomy; Hand surgery (Left); Diagnostic laparoscopy; Colonoscopy (06/30/2014); Mammo (historical) (11/26/2016); Hysterectomy (06/28/2021); Oophorectomy (Bilateral, 06/28/2021); and Tonsillectomy (1964?).  Her family history includes Arthritis in her mother; Autoimmune disease in her father; Breast cancer in her maternal aunt; Cancer in her father and mother; Colon cancer in her maternal grandmother; Diabetes in her father; Hearing loss in her father; Heart attack in her father; Hyperlipidemia in her mother; Hypertension in her mother; No Known Problems in her daughter, maternal aunt, and maternal aunt; Skin cancer in her father and mother; Stroke in her father and mother; Thyroid disease in her father and mother.  She  reports that she has never smoked. She has never used smokeless tobacco. She reports that she does not currently use alcohol after a past usage of about 1.0 standard drink of alcohol per week. She reports that she does not use drugs.  Current Outpatient Medications   Medication Sig Dispense Refill    ALPRAZolam (XANAX) 0.25 mg tablet Take 1 tablet (0.25 mg total) by mouth daily at bedtime as needed for anxiety 2 tablet 0    Cholecalciferol (VITAMIN D-3) 5000 units TABS       diclofenac sodium (VOLTAREN) 1 % if needed      EPINEPHrine (EPIPEN) 0.3 mg/0.3 mL SOAJ       estradiol (ESTRACE) 0.1 mg/g vaginal cream As needed      Fexofenadine HCl (ALLEGRA PO) Take 1 tablet by mouth in the morning      levalbuterol (XOPENEX HFA) 45 mcg/act inhaler Inhale 2 puffs every 4 (four) hours as needed      losartan (COZAAR) 50 mg tablet Take 1 tablet (50 mg total) by mouth daily 90 tablet 3    meclizine (ANTIVERT) 12.5 MG tablet Take 12.5 mg by mouth every 8 (eight) hours as needed for nausea or dizziness      mometasone (NASONEX) 50 mcg/act nasal spray mometasone 50 mcg/actuation nasal spray      mometasone-formoterol (Dulera) 100-5 MCG/ACT inhaler Inhale 2 puffs 2 (two) times a day Rinse  mouth after use.      neomycin-polymyxin-dexamethasone (Maxitrol) 0.1 % ophthalmic suspension INSTILL 1 DROP IN BOTH EYES FOUR TIMES A DAY      ondansetron (ZOFRAN) 4 mg tablet Take 1 tablet (4 mg total) by mouth every 8 (eight) hours as needed for nausea or vomiting 20 tablet 0    pantoprazole (PROTONIX) 40 mg tablet Take 40 mg by mouth daily      PreviDent 5000 Booster Plus 1.1 % PSTE       Restasis 0.05 % ophthalmic emulsion Administer 1 drop to both eyes every 12 (twelve) hours      Salicylic Acid 6 % CREA As needed      sodium chloride (OCEAN) 0.65 % nasal spray 1 spray into each nostril every 2 (two) hours while awake 60 mL 1    SUMAtriptan (IMITREX) 50 mg tablet Take 1 tablet (50 mg total) by mouth once as needed for migraine for up to 1 dose 9 tablet 0    Synthroid 100 MCG tablet Take 100 mcg by mouth every other day      Synthroid 112 MCG tablet Take 112 mcg by mouth every other day      triamcinolone acetonide (KENALOG-40) 40 mg/mL Inject 40 mg into a muscle once      urea (CARMOL) 40 % if needed      simethicone (MYLICON) 80 mg chewable tablet Chew every 8 (eight) hours (Patient not taking: Reported on 8/25/2023)       No current facility-administered medications for this visit.     Current Outpatient Medications on File Prior to Visit   Medication Sig    ALPRAZolam (XANAX) 0.25 mg tablet Take 1 tablet (0.25 mg total) by mouth daily at bedtime as needed for anxiety    Cholecalciferol (VITAMIN D-3) 5000 units TABS     diclofenac sodium (VOLTAREN) 1 % if needed    EPINEPHrine (EPIPEN) 0.3 mg/0.3 mL SOAJ     estradiol (ESTRACE) 0.1 mg/g vaginal cream As needed    Fexofenadine HCl (ALLEGRA PO) Take 1 tablet by mouth in the morning    levalbuterol (XOPENEX HFA) 45 mcg/act inhaler Inhale 2 puffs every 4 (four) hours as needed    losartan (COZAAR) 50 mg tablet Take 1 tablet (50 mg total) by mouth daily    meclizine (ANTIVERT) 12.5 MG tablet Take 12.5 mg by mouth every 8 (eight) hours as needed for nausea or  dizziness    mometasone (NASONEX) 50 mcg/act nasal spray mometasone 50 mcg/actuation nasal spray    mometasone-formoterol (Dulera) 100-5 MCG/ACT inhaler Inhale 2 puffs 2 (two) times a day Rinse mouth after use.    neomycin-polymyxin-dexamethasone (Maxitrol) 0.1 % ophthalmic suspension INSTILL 1 DROP IN BOTH EYES FOUR TIMES A DAY    ondansetron (ZOFRAN) 4 mg tablet Take 1 tablet (4 mg total) by mouth every 8 (eight) hours as needed for nausea or vomiting    pantoprazole (PROTONIX) 40 mg tablet Take 40 mg by mouth daily    PreviDent 5000 Booster Plus 1.1 % PSTE     Restasis 0.05 % ophthalmic emulsion Administer 1 drop to both eyes every 12 (twelve) hours    Salicylic Acid 6 % CREA As needed    sodium chloride (OCEAN) 0.65 % nasal spray 1 spray into each nostril every 2 (two) hours while awake    SUMAtriptan (IMITREX) 50 mg tablet Take 1 tablet (50 mg total) by mouth once as needed for migraine for up to 1 dose    Synthroid 100 MCG tablet Take 100 mcg by mouth every other day    Synthroid 112 MCG tablet Take 112 mcg by mouth every other day    triamcinolone acetonide (KENALOG-40) 40 mg/mL Inject 40 mg into a muscle once    urea (CARMOL) 40 % if needed    simethicone (MYLICON) 80 mg chewable tablet Chew every 8 (eight) hours (Patient not taking: Reported on 8/25/2023)    [DISCONTINUED] amLODIPine (NORVASC) 2.5 mg tablet Take 1 tablet (2.5 mg total) by mouth daily     No current facility-administered medications on file prior to visit.     Medications Discontinued During This Encounter   Medication Reason    amLODIPine (NORVASC) 2.5 mg tablet       She is allergic to bee pollen, dog epithelium (canis lupus familiaris), other, and pollen extract-tree extract [pollen extract]..    Review of Systems   Constitutional:  Negative for appetite change, chills, fatigue and fever.   HENT:  Negative for sore throat and trouble swallowing.    Eyes:  Negative for redness.   Respiratory:  Negative for shortness of breath.   "  Cardiovascular:  Negative for chest pain and palpitations.   Gastrointestinal:  Negative for abdominal pain, constipation and diarrhea.   Genitourinary:  Negative for dysuria and hematuria.   Musculoskeletal:  Positive for arthralgias and back pain. Negative for neck pain.        Pain in the right upper lumbar and right flank   Skin:  Negative for rash.   Neurological:  Negative for seizures, weakness and headaches.   Hematological:  Negative for adenopathy.   Psychiatric/Behavioral:  Negative for confusion. The patient is not nervous/anxious.          Objective:      /82 (BP Location: Left arm, Patient Position: Sitting, Cuff Size: Large)   Pulse 76   Temp 98.6 °F (37 °C) (Temporal)   Ht 5' 3\" (1.6 m)   Wt 81.6 kg (180 lb)   LMP  (LMP Unknown)   SpO2 97%   BMI 31.89 kg/m²     Results Reviewed       None            Recent Results (from the past 8 weeks)   UA (URINE) with reflex to Scope    Collection Time: 10/29/24  6:55 AM   Result Value Ref Range    Color, UA Colorless     Clarity, UA Clear     Specific Gravity, UA 1.013 1.003 - 1.030    pH, UA 6.5 4.5, 5.0, 5.5, 6.0, 6.5, 7.0, 7.5, 8.0    Leukocytes, UA Negative Negative    Nitrite, UA Negative Negative    Protein, UA Negative Negative mg/dl    Glucose, UA Negative Negative mg/dl    Ketones, UA Negative Negative mg/dl    Urobilinogen, UA <2.0 <2.0 mg/dl mg/dl    Bilirubin, UA Negative Negative    Occult Blood, UA Negative Negative   Albumin / creatinine urine ratio    Collection Time: 10/29/24  6:55 AM   Result Value Ref Range    Creatinine, Ur 42.2 Reference range not established. mg/dL    Albumin,U,Random <7.0 <20.0 mg/L    Albumin Creat Ratio     Comprehensive metabolic panel    Collection Time: 10/29/24  6:55 AM   Result Value Ref Range    Sodium 140 135 - 147 mmol/L    Potassium 4.3 3.5 - 5.3 mmol/L    Chloride 104 96 - 108 mmol/L    CO2 28 21 - 32 mmol/L    ANION GAP 8 4 - 13 mmol/L    BUN 18 5 - 25 mg/dL    Creatinine 0.66 0.60 - 1.30 mg/dL "    Glucose, Fasting 84 65 - 99 mg/dL    Calcium 8.3 (L) 8.4 - 10.2 mg/dL    AST 13 13 - 39 U/L    ALT 11 7 - 52 U/L    Alkaline Phosphatase 83 34 - 104 U/L    Total Protein 6.0 (L) 6.4 - 8.4 g/dL    Albumin 3.7 3.5 - 5.0 g/dL    Total Bilirubin 0.34 0.20 - 1.00 mg/dL    eGFR 92 ml/min/1.73sq m   CBC and differential    Collection Time: 10/29/24  6:55 AM   Result Value Ref Range    WBC 4.24 (L) 4.31 - 10.16 Thousand/uL    RBC 4.28 3.81 - 5.12 Million/uL    Hemoglobin 12.7 11.5 - 15.4 g/dL    Hematocrit 39.9 34.8 - 46.1 %    MCV 93 82 - 98 fL    MCH 29.7 26.8 - 34.3 pg    MCHC 31.8 31.4 - 37.4 g/dL    RDW 13.3 11.6 - 15.1 %    MPV 11.3 8.9 - 12.7 fL    Platelets 252 149 - 390 Thousands/uL    nRBC 0 /100 WBCs    Segmented % 45 43 - 75 %    Immature Grans % 0 0 - 2 %    Lymphocytes % 38 14 - 44 %    Monocytes % 13 (H) 4 - 12 %    Eosinophils Relative 3 0 - 6 %    Basophils Relative 1 0 - 1 %    Absolute Neutrophils 1.94 1.85 - 7.62 Thousands/µL    Absolute Immature Grans 0.01 0.00 - 0.20 Thousand/uL    Absolute Lymphocytes 1.60 0.60 - 4.47 Thousands/µL    Absolute Monocytes 0.53 0.17 - 1.22 Thousand/µL    Eosinophils Absolute 0.11 0.00 - 0.61 Thousand/µL    Basophils Absolute 0.05 0.00 - 0.10 Thousands/µL   TSH, 3rd generation    Collection Time: 10/29/24  6:55 AM   Result Value Ref Range    TSH 3RD GENERATON 1.333 0.450 - 4.500 uIU/mL   T4, free    Collection Time: 10/29/24  6:55 AM   Result Value Ref Range    Free T4 1.17 (H) 0.61 - 1.12 ng/dL   Hemoglobin A1C    Collection Time: 10/29/24  6:55 AM   Result Value Ref Range    Hemoglobin A1C 5.8 (H) Normal 4.0-5.6%; PreDiabetic 5.7-6.4%; Diabetic >=6.5%; Glycemic control for adults with diabetes <7.0% %     mg/dl   Magnesium    Collection Time: 10/29/24  6:55 AM   Result Value Ref Range    Magnesium 2.1 1.9 - 2.7 mg/dL   POCT urine dip    Collection Time: 12/10/24 12:23 PM   Result Value Ref Range     PH,UA 6.0     BLOOD,UA neg     SPECIFIC GRAVITY,UA 1.005          Physical Exam  Constitutional:       General: She is not in acute distress.     Appearance: Normal appearance.   HENT:      Head: Normocephalic and atraumatic.      Nose: Nose normal.      Mouth/Throat:      Mouth: Mucous membranes are moist.   Eyes:      Extraocular Movements: Extraocular movements intact.      Pupils: Pupils are equal, round, and reactive to light.   Cardiovascular:      Rate and Rhythm: Normal rate and regular rhythm.      Pulses: Normal pulses.      Heart sounds: Normal heart sounds. No murmur heard.     No friction rub.   Pulmonary:      Effort: Pulmonary effort is normal. No respiratory distress.      Breath sounds: Normal breath sounds. No wheezing.   Abdominal:      General: Abdomen is flat. Bowel sounds are normal. There is no distension.      Palpations: Abdomen is soft. There is no mass.      Tenderness: There is no abdominal tenderness. There is no guarding.   Musculoskeletal:         General: Normal range of motion.      Cervical back: Neck supple.   Neurological:      General: No focal deficit present.      Mental Status: She is alert and oriented to person, place, and time. Mental status is at baseline.      Cranial Nerves: No cranial nerve deficit.   Psychiatric:         Mood and Affect: Mood normal.         Behavior: Behavior normal.

## 2024-12-11 LAB — BACTERIA UR CULT: NORMAL

## 2024-12-15 ENCOUNTER — HOSPITAL ENCOUNTER (OUTPATIENT)
Dept: ULTRASOUND IMAGING | Facility: HOSPITAL | Age: 64
Discharge: HOME | End: 2024-12-15
Attending: SPECIALIST
Payer: COMMERCIAL

## 2024-12-15 DIAGNOSIS — E66.9 OBESITY, UNSPECIFIED: ICD-10-CM

## 2024-12-15 DIAGNOSIS — E34.9 ENDOCRINE DISORDER, UNSPECIFIED: ICD-10-CM

## 2024-12-15 DIAGNOSIS — E03.9 HYPOTHYROIDISM, UNSPECIFIED: ICD-10-CM

## 2024-12-15 DIAGNOSIS — E06.3 AUTOIMMUNE THYROIDITIS: ICD-10-CM

## 2024-12-15 DIAGNOSIS — E03.2 HYPOTHYROIDISM DUE TO MEDICAMENTS AND OTHER EXOGENOUS SUBSTANCES: ICD-10-CM

## 2024-12-15 PROCEDURE — 76700 US EXAM ABDOM COMPLETE: CPT

## 2025-01-27 DIAGNOSIS — I10 ESSENTIAL HYPERTENSION: ICD-10-CM

## 2025-01-27 RX ORDER — LOSARTAN POTASSIUM 50 MG/1
50 TABLET ORAL DAILY
Qty: 90 TABLET | Refills: 1 | Status: SHIPPED | OUTPATIENT
Start: 2025-01-27

## 2025-03-07 ENCOUNTER — APPOINTMENT (OUTPATIENT)
Dept: LAB | Facility: AMBULARY SURGERY CENTER | Age: 65
End: 2025-03-07
Payer: COMMERCIAL

## 2025-03-07 DIAGNOSIS — E66.9 LIFELONG OBESITY: ICD-10-CM

## 2025-03-07 DIAGNOSIS — E03.9 MYXEDEMA HEART DISEASE: ICD-10-CM

## 2025-03-07 DIAGNOSIS — E03.2 IATROGENIC HYPOTHYROIDISM: ICD-10-CM

## 2025-03-07 DIAGNOSIS — I51.9 MYXEDEMA HEART DISEASE: ICD-10-CM

## 2025-03-07 DIAGNOSIS — E06.3 CHRONIC LYMPHOCYTIC THYROIDITIS: ICD-10-CM

## 2025-03-07 LAB
ALBUMIN SERPL BCG-MCNC: 4 G/DL (ref 3.5–5)
ALP SERPL-CCNC: 97 U/L (ref 34–104)
ALT SERPL W P-5'-P-CCNC: 12 U/L (ref 7–52)
AMORPH URATE CRY URNS QL MICRO: ABNORMAL
AMYLASE SERPL-CCNC: 22 IU/L (ref 29–103)
ANION GAP SERPL CALCULATED.3IONS-SCNC: 8 MMOL/L (ref 4–13)
AST SERPL W P-5'-P-CCNC: 15 U/L (ref 13–39)
BACTERIA UR QL AUTO: ABNORMAL /HPF
BASOPHILS # BLD AUTO: 0.04 THOUSANDS/ÂΜL (ref 0–0.1)
BASOPHILS NFR BLD AUTO: 1 % (ref 0–1)
BILIRUB SERPL-MCNC: 0.47 MG/DL (ref 0.2–1)
BILIRUB UR QL STRIP: NEGATIVE
BUN SERPL-MCNC: 15 MG/DL (ref 5–25)
CALCIUM SERPL-MCNC: 8.9 MG/DL (ref 8.4–10.2)
CHLORIDE SERPL-SCNC: 104 MMOL/L (ref 96–108)
CHOLEST SERPL-MCNC: 168 MG/DL (ref ?–200)
CLARITY UR: CLEAR
CO2 SERPL-SCNC: 27 MMOL/L (ref 21–32)
COLOR UR: COLORLESS
CORTIS AM PEAK SERPL-MCNC: 15.8 UG/DL (ref 6.7–22.6)
CREAT SERPL-MCNC: 0.58 MG/DL (ref 0.6–1.3)
CREAT UR-MCNC: 60 MG/DL
CRP SERPL HS-MCNC: 6.02 MG/L
EOSINOPHIL # BLD AUTO: 0.17 THOUSAND/ÂΜL (ref 0–0.61)
EOSINOPHIL NFR BLD AUTO: 4 % (ref 0–6)
ERYTHROCYTE [DISTWIDTH] IN BLOOD BY AUTOMATED COUNT: 13.2 % (ref 11.6–15.1)
EST. AVERAGE GLUCOSE BLD GHB EST-MCNC: 120 MG/DL
FERRITIN SERPL-MCNC: 19 NG/ML (ref 11–307)
GFR SERPL CREATININE-BSD FRML MDRD: 97 ML/MIN/1.73SQ M
GLUCOSE P FAST SERPL-MCNC: 95 MG/DL (ref 65–99)
GLUCOSE UR STRIP-MCNC: NEGATIVE MG/DL
HBA1C MFR BLD: 5.8 %
HCT VFR BLD AUTO: 40.1 % (ref 34.8–46.1)
HDLC SERPL-MCNC: 54 MG/DL
HGB BLD-MCNC: 13.3 G/DL (ref 11.5–15.4)
HGB UR QL STRIP.AUTO: NEGATIVE
IMM GRANULOCYTES # BLD AUTO: 0 THOUSAND/UL (ref 0–0.2)
IMM GRANULOCYTES NFR BLD AUTO: 0 % (ref 0–2)
IRON SATN MFR SERPL: 15 % (ref 15–50)
IRON SERPL-MCNC: 50 UG/DL (ref 50–212)
KETONES UR STRIP-MCNC: NEGATIVE MG/DL
LDLC SERPL CALC-MCNC: 97 MG/DL (ref 0–100)
LEUKOCYTE ESTERASE UR QL STRIP: NEGATIVE
LIPASE SERPL-CCNC: 7 U/L (ref 11–82)
LYMPHOCYTES # BLD AUTO: 1.47 THOUSANDS/ÂΜL (ref 0.6–4.47)
LYMPHOCYTES NFR BLD AUTO: 32 % (ref 14–44)
MAGNESIUM SERPL-MCNC: 1.9 MG/DL (ref 1.9–2.7)
MCH RBC QN AUTO: 29.6 PG (ref 26.8–34.3)
MCHC RBC AUTO-ENTMCNC: 33.2 G/DL (ref 31.4–37.4)
MCV RBC AUTO: 89 FL (ref 82–98)
MICROALBUMIN UR-MCNC: <7 MG/L
MONOCYTES # BLD AUTO: 0.62 THOUSAND/ÂΜL (ref 0.17–1.22)
MONOCYTES NFR BLD AUTO: 14 % (ref 4–12)
NEUTROPHILS # BLD AUTO: 2.28 THOUSANDS/ÂΜL (ref 1.85–7.62)
NEUTS SEG NFR BLD AUTO: 49 % (ref 43–75)
NITRITE UR QL STRIP: NEGATIVE
NON-SQ EPI CELLS URNS QL MICRO: ABNORMAL /HPF
NONHDLC SERPL-MCNC: 114 MG/DL
NRBC BLD AUTO-RTO: 0 /100 WBCS
PH UR STRIP.AUTO: 8 [PH]
PHOSPHATE SERPL-MCNC: 3.8 MG/DL (ref 2.3–4.1)
PLATELET # BLD AUTO: 247 THOUSANDS/UL (ref 149–390)
PMV BLD AUTO: 11.2 FL (ref 8.9–12.7)
POTASSIUM SERPL-SCNC: 4.1 MMOL/L (ref 3.5–5.3)
PROT SERPL-MCNC: 6.6 G/DL (ref 6.4–8.4)
PROT UR STRIP-MCNC: NEGATIVE MG/DL
RBC # BLD AUTO: 4.5 MILLION/UL (ref 3.81–5.12)
RBC #/AREA URNS AUTO: ABNORMAL /HPF
SODIUM SERPL-SCNC: 139 MMOL/L (ref 135–147)
SP GR UR STRIP.AUTO: <1.005 (ref 1–1.03)
T4 FREE SERPL-MCNC: 0.97 NG/DL (ref 0.61–1.12)
TIBC SERPL-MCNC: 344.4 UG/DL (ref 250–450)
TRANSFERRIN SERPL-MCNC: 246 MG/DL (ref 203–362)
TRIGL SERPL-MCNC: 83 MG/DL (ref ?–150)
TSH SERPL DL<=0.05 MIU/L-ACNC: 0.95 UIU/ML (ref 0.45–4.5)
UIBC SERPL-MCNC: 294 UG/DL (ref 155–355)
UROBILINOGEN UR STRIP-ACNC: <2 MG/DL
WBC # BLD AUTO: 4.58 THOUSAND/UL (ref 4.31–10.16)
WBC #/AREA URNS AUTO: ABNORMAL /HPF

## 2025-03-07 PROCEDURE — 36415 COLL VENOUS BLD VENIPUNCTURE: CPT

## 2025-03-07 PROCEDURE — 82150 ASSAY OF AMYLASE: CPT

## 2025-03-07 PROCEDURE — 80053 COMPREHEN METABOLIC PANEL: CPT

## 2025-03-07 PROCEDURE — 82308 ASSAY OF CALCITONIN: CPT

## 2025-03-07 PROCEDURE — 86141 C-REACTIVE PROTEIN HS: CPT

## 2025-03-07 PROCEDURE — 83036 HEMOGLOBIN GLYCOSYLATED A1C: CPT

## 2025-03-07 PROCEDURE — 82533 TOTAL CORTISOL: CPT

## 2025-03-07 PROCEDURE — 83690 ASSAY OF LIPASE: CPT

## 2025-03-07 PROCEDURE — 83695 ASSAY OF LIPOPROTEIN(A): CPT

## 2025-03-07 PROCEDURE — 83550 IRON BINDING TEST: CPT

## 2025-03-07 PROCEDURE — 82728 ASSAY OF FERRITIN: CPT

## 2025-03-07 PROCEDURE — 85025 COMPLETE CBC W/AUTO DIFF WBC: CPT

## 2025-03-07 PROCEDURE — 84439 ASSAY OF FREE THYROXINE: CPT

## 2025-03-07 PROCEDURE — 84165 PROTEIN E-PHORESIS SERUM: CPT

## 2025-03-07 PROCEDURE — 82024 ASSAY OF ACTH: CPT

## 2025-03-07 PROCEDURE — 83835 ASSAY OF METANEPHRINES: CPT

## 2025-03-07 PROCEDURE — 84100 ASSAY OF PHOSPHORUS: CPT

## 2025-03-07 PROCEDURE — 82627 DEHYDROEPIANDROSTERONE: CPT

## 2025-03-07 PROCEDURE — 84443 ASSAY THYROID STIM HORMONE: CPT

## 2025-03-07 PROCEDURE — 83735 ASSAY OF MAGNESIUM: CPT

## 2025-03-07 PROCEDURE — 80061 LIPID PANEL: CPT

## 2025-03-07 PROCEDURE — 82570 ASSAY OF URINE CREATININE: CPT

## 2025-03-07 PROCEDURE — 81001 URINALYSIS AUTO W/SCOPE: CPT

## 2025-03-07 PROCEDURE — 84166 PROTEIN E-PHORESIS/URINE/CSF: CPT

## 2025-03-07 PROCEDURE — 82043 UR ALBUMIN QUANTITATIVE: CPT

## 2025-03-07 PROCEDURE — 83540 ASSAY OF IRON: CPT

## 2025-03-08 LAB — DHEA-S SERPL-MCNC: 35.2 UG/DL (ref 20.4–186.6)

## 2025-03-10 LAB
CALCIT SERPL-MCNC: <2 PG/ML (ref 0–5)
LPA SERPL-SCNC: 9.4 NMOL/L

## 2025-03-11 LAB
ACTH PLAS-MCNC: 53.7 PG/ML (ref 7.2–63.3)
ALBUMIN SERPL ELPH-MCNC: 3.9 G/DL (ref 3.2–5.1)
ALBUMIN SERPL ELPH-MCNC: 60.9 % (ref 48–70)
ALBUMIN UR ELPH-MCNC: 100 %
ALPHA1 GLOB MFR UR ELPH: 0 %
ALPHA1 GLOB SERPL ELPH-MCNC: 0.24 G/DL (ref 0.15–0.47)
ALPHA1 GLOB SERPL ELPH-MCNC: 3.8 % (ref 1.8–7)
ALPHA2 GLOB MFR UR ELPH: 0 %
ALPHA2 GLOB SERPL ELPH-MCNC: 0.61 G/DL (ref 0.42–1.04)
ALPHA2 GLOB SERPL ELPH-MCNC: 9.6 % (ref 5.9–14.9)
B-GLOBULIN MFR UR ELPH: 0 %
BETA GLOB ABNORMAL SERPL ELPH-MCNC: 0.42 G/DL (ref 0.31–0.57)
BETA1 GLOB SERPL ELPH-MCNC: 6.6 % (ref 4.7–7.7)
BETA2 GLOB SERPL ELPH-MCNC: 6 % (ref 3.1–7.9)
BETA2+GAMMA GLOB SERPL ELPH-MCNC: 0.38 G/DL (ref 0.2–0.58)
GAMMA GLOB ABNORMAL SERPL ELPH-MCNC: 0.84 G/DL (ref 0.4–1.66)
GAMMA GLOB MFR UR ELPH: 0 %
GAMMA GLOB SERPL ELPH-MCNC: 13.1 % (ref 6.9–22.3)
IGG/ALB SER: 1.56 {RATIO} (ref 1.1–1.8)
PROT PATTERN SERPL ELPH-IMP: NORMAL
PROT PATTERN UR ELPH-IMP: NORMAL
PROT SERPL-MCNC: 6.4 G/DL (ref 6.4–8.2)
PROT UR-MCNC: 6.5 MG/DL

## 2025-03-11 PROCEDURE — 84165 PROTEIN E-PHORESIS SERUM: CPT | Performed by: STUDENT IN AN ORGANIZED HEALTH CARE EDUCATION/TRAINING PROGRAM

## 2025-03-11 PROCEDURE — 84166 PROTEIN E-PHORESIS/URINE/CSF: CPT | Performed by: STUDENT IN AN ORGANIZED HEALTH CARE EDUCATION/TRAINING PROGRAM

## 2025-03-12 LAB
METANEPH FREE SERPL-MCNC: 30.5 PG/ML (ref 0–88)
NORMETANEPHRINE SERPL-MCNC: 71.8 PG/ML (ref 0–285.2)

## 2025-07-25 DIAGNOSIS — I10 ESSENTIAL HYPERTENSION: ICD-10-CM

## 2025-07-28 RX ORDER — LOSARTAN POTASSIUM 50 MG/1
50 TABLET ORAL DAILY
Qty: 90 TABLET | Refills: 1 | Status: SHIPPED | OUTPATIENT
Start: 2025-07-28 | End: 2025-08-08

## 2025-08-08 ENCOUNTER — OFFICE VISIT (OUTPATIENT)
Age: 65
End: 2025-08-08
Payer: COMMERCIAL

## 2025-08-08 VITALS
SYSTOLIC BLOOD PRESSURE: 126 MMHG | HEART RATE: 78 BPM | WEIGHT: 173 LBS | DIASTOLIC BLOOD PRESSURE: 80 MMHG | HEIGHT: 63 IN | RESPIRATION RATE: 16 BRPM | OXYGEN SATURATION: 97 % | TEMPERATURE: 98.2 F | BODY MASS INDEX: 30.65 KG/M2

## 2025-08-08 DIAGNOSIS — G43.109 MIGRAINE WITH AURA AND WITHOUT STATUS MIGRAINOSUS, NOT INTRACTABLE: ICD-10-CM

## 2025-08-08 DIAGNOSIS — J45.20 MILD INTERMITTENT ASTHMA, UNSPECIFIED WHETHER COMPLICATED: ICD-10-CM

## 2025-08-08 DIAGNOSIS — E06.3 HYPOTHYROIDISM DUE TO HASHIMOTO'S THYROIDITIS: ICD-10-CM

## 2025-08-08 DIAGNOSIS — G89.29 CHRONIC PAIN OF BOTH KNEES: ICD-10-CM

## 2025-08-08 DIAGNOSIS — M25.562 CHRONIC PAIN OF BOTH KNEES: ICD-10-CM

## 2025-08-08 DIAGNOSIS — K21.9 GASTROESOPHAGEAL REFLUX DISEASE, UNSPECIFIED WHETHER ESOPHAGITIS PRESENT: ICD-10-CM

## 2025-08-08 DIAGNOSIS — M25.561 CHRONIC PAIN OF BOTH KNEES: ICD-10-CM

## 2025-08-08 DIAGNOSIS — I10 PRIMARY HYPERTENSION: ICD-10-CM

## 2025-08-08 DIAGNOSIS — I10 ESSENTIAL HYPERTENSION: Primary | ICD-10-CM

## 2025-08-08 PROCEDURE — 99215 OFFICE O/P EST HI 40 MIN: CPT | Performed by: INTERNAL MEDICINE

## 2025-08-08 RX ORDER — LOSARTAN POTASSIUM 25 MG/1
25 TABLET ORAL EVERY 12 HOURS
Qty: 180 TABLET | Refills: 2 | Status: SHIPPED
Start: 2025-08-08

## 2025-08-12 ENCOUNTER — TELEPHONE (OUTPATIENT)
Age: 65
End: 2025-08-12